# Patient Record
Sex: FEMALE | Race: WHITE | NOT HISPANIC OR LATINO | Employment: FULL TIME | URBAN - METROPOLITAN AREA
[De-identification: names, ages, dates, MRNs, and addresses within clinical notes are randomized per-mention and may not be internally consistent; named-entity substitution may affect disease eponyms.]

---

## 2017-02-17 ENCOUNTER — OFFICE VISIT (OUTPATIENT)
Dept: URGENT CARE | Facility: URGENT CARE | Age: 32
End: 2017-02-17
Payer: COMMERCIAL

## 2017-02-17 VITALS
HEART RATE: 110 BPM | OXYGEN SATURATION: 99 % | WEIGHT: 203 LBS | HEIGHT: 65 IN | TEMPERATURE: 101.3 F | RESPIRATION RATE: 18 BRPM | SYSTOLIC BLOOD PRESSURE: 124 MMHG | BODY MASS INDEX: 33.82 KG/M2 | DIASTOLIC BLOOD PRESSURE: 78 MMHG

## 2017-02-17 DIAGNOSIS — J20.9 ACUTE BRONCHITIS WITH COEXISTING CONDITION REQUIRING PROPHYLACTIC TREATMENT: ICD-10-CM

## 2017-02-17 DIAGNOSIS — R07.0 THROAT PAIN: Primary | ICD-10-CM

## 2017-02-17 LAB
DEPRECATED S PYO AG THROAT QL EIA: NORMAL
MICRO REPORT STATUS: NORMAL
SPECIMEN SOURCE: NORMAL

## 2017-02-17 PROCEDURE — 87880 STREP A ASSAY W/OPTIC: CPT | Performed by: FAMILY MEDICINE

## 2017-02-17 PROCEDURE — 99213 OFFICE O/P EST LOW 20 MIN: CPT | Performed by: FAMILY MEDICINE

## 2017-02-17 PROCEDURE — 87081 CULTURE SCREEN ONLY: CPT | Performed by: FAMILY MEDICINE

## 2017-02-17 RX ORDER — DOXYCYCLINE 100 MG/1
100 CAPSULE ORAL 2 TIMES DAILY
Qty: 20 CAPSULE | Refills: 0 | Status: SHIPPED | OUTPATIENT
Start: 2017-02-17 | End: 2017-02-27

## 2017-02-17 RX ORDER — ALBUTEROL SULFATE 90 UG/1
1-2 AEROSOL, METERED RESPIRATORY (INHALATION) EVERY 4 HOURS PRN
Qty: 1 INHALER | Refills: 0 | Status: SHIPPED | OUTPATIENT
Start: 2017-02-17 | End: 2018-03-07

## 2017-02-17 NOTE — MR AVS SNAPSHOT
"              After Visit Summary   2/17/2017    Sayda Valles    MRN: [id number]           Patient Information     Date Of Birth          1985        Visit Information        Provider Department      2/17/2017 8:15 PM Marielle Steiner MD [facility]        Today's Diagnoses     Throat pain    -  1    Acute bronchitis with coexisting condition requiring prophylactic treatment           Follow-ups after your visit        Who to contact     If you have questions or need follow up information about today's clinic visit or your schedule please contact [facility] directly at [phone].  Normal or non-critical lab and imaging results will be communicated to you by ConnectionPlushart, letter or phone within 4 business days after the clinic has received the results. If you do not hear from us within 7 days, please contact the clinic through ConnectionPlushart or phone. If you have a critical or abnormal lab result, we will notify you by phone as soon as possible.  Submit refill requests through Qbix or call your pharmacy and they will forward the refill request to us. Please allow 3 business days for your refill to be completed.          Additional Information About Your Visit        MyChart Information     Qbix gives you secure access to your electronic health record. If you see a primary care provider, you can also send messages to your care team and make appointments. If you have questions, please call your primary care clinic.  If you do not have a primary care provider, please call [phone] and they will assist you.        Care EveryWhere ID     This is your Care EveryWhere ID. This could be used by other organizations to access your [facility] medical records  [id number]        Your Vitals Were     Pulse Temperature Respirations Height Pulse Oximetry BMI (Body Mass Index)    110 101.3  F (38.5  C) (Oral) 18 5' 5\" (1.651 m) 99% 33.78 kg/m2       Blood Pressure from Last 3 " Encounters:   02/17/17 124/78   11/17/16 120/79   09/28/16 134/80    Weight from Last 3 Encounters:   02/17/17 203 lb (92.1 kg)   11/17/16 203 lb (92.1 kg)   09/28/16 199 lb (90.3 kg)              We Performed the Following     Beta strep group A culture     Strep, Rapid Screen          Today's Medication Changes          These changes are accurate as of: 2/17/17  9:21 PM.  If you have any questions, ask your nurse or doctor.               Start taking these medicines.        Dose/Directions    doxycycline 100 MG capsule   Commonly known as:  VIBRAMYCIN   Used for:  Acute bronchitis with coexisting condition requiring prophylactic treatment   Started by:  Marielle Steiner MD        Dose:  100 mg   Take 1 capsule (100 mg) by mouth 2 times daily for 10 days   Quantity:  20 capsule   Refills:  0         These medicines have changed or have updated prescriptions.        Dose/Directions    * albuterol 108 (90 BASE) MCG/ACT Inhaler   Commonly known as:  PROAIR HFA/PROVENTIL HFA/VENTOLIN HFA   This may have changed:  Another medication with the same name was added. Make sure you understand how and when to take each.   Used for:  Mild intermittent asthma   Changed by:  Kristyn Cross MD        Dose:  2 puff   Inhale 2 puffs into the lungs 4 times daily as needed   Quantity:  3 Inhaler   Refills:  12       * albuterol 108 (90 BASE) MCG/ACT Inhaler   Commonly known as:  PROAIR HFA/PROVENTIL HFA/VENTOLIN HFA   This may have changed:  You were already taking a medication with the same name, and this prescription was added. Make sure you understand how and when to take each.   Used for:  Acute bronchitis with coexisting condition requiring prophylactic treatment   Changed by:  Marielle Steiner MD        Dose:  1-2 puff   Inhale 1-2 puffs into the lungs every 4 hours as needed for shortness of breath / dyspnea or wheezing   Quantity:  1 Inhaler   Refills:  0       * Notice:  This list has 2 medication(s) that are  the same as other medications prescribed for you. Read the directions carefully, and ask your doctor or other care provider to review them with you.         Where to get your medicines      These medications were sent to [id number]  [place], [place] - [address] [place] [address] AT [facility] OF [facility] [address], [place] [address]    Hours:  24-hours Phone:  [phone]     albuterol 108 (90 BASE) MCG/ACT Inhaler    doxycycline 100 MG capsule                Primary Care Provider Office Phone # Fax #    Karen Weiler, [phone]       [facility] [place] [address] [facility] [address]        Thank you!     Thank you for choosing [facility]  for your care. Our goal is always to provide you with excellent care. Hearing back from our patients is one way we can continue to improve our services. Please take a few minutes to complete the written survey that you may receive in the mail after your visit with us. Thank you!             Your Updated Medication List - Protect others around you: Learn how to safely use, store and throw away your medicines at www.disposemymeds.org.          This list is accurate as of: 2/17/17  9:21 PM.  Always use your most recent med list.                   Brand Name Dispense Instructions for use    * albuterol 108 (90 BASE) MCG/ACT Inhaler    PROAIR HFA/PROVENTIL HFA/VENTOLIN HFA    3 Inhaler    Inhale 2 puffs into the lungs 4 times daily as needed       * albuterol 108 (90 BASE) MCG/ACT Inhaler    PROAIR HFA/PROVENTIL HFA/VENTOLIN HFA    1 Inhaler    Inhale 1-2 puffs into the lungs every 4 hours as needed for shortness of breath / dyspnea or wheezing       doxycycline 100 MG capsule    VIBRAMYCIN    20 capsule    Take 1 capsule (100 mg) by mouth 2 times daily for 10 days       fluticasone 110 MCG/ACT Inhaler    FLOVENT HFA    3 Inhaler    Inhale 2 puffs into the lungs 2 times daily 2 puffs       norethindrone-ethinyl  estradiol 1-20 MG-MCG per tablet    JUNEL FE 1/20    84 tablet    Take 1 tablet by mouth daily       * Notice:  This list has 2 medication(s) that are the same as other medications prescribed for you. Read the directions carefully, and ask your doctor or other care provider to review them with you.

## 2017-02-18 ENCOUNTER — OFFICE VISIT (OUTPATIENT)
Dept: FAMILY MEDICINE | Facility: CLINIC | Age: 32
End: 2017-02-18
Payer: COMMERCIAL

## 2017-02-18 VITALS
SYSTOLIC BLOOD PRESSURE: 122 MMHG | HEIGHT: 65 IN | WEIGHT: 210 LBS | TEMPERATURE: 98.3 F | DIASTOLIC BLOOD PRESSURE: 66 MMHG | RESPIRATION RATE: 12 BRPM | HEART RATE: 88 BPM | BODY MASS INDEX: 34.99 KG/M2

## 2017-02-18 DIAGNOSIS — J20.9 ACUTE BRONCHITIS, UNSPECIFIED ORGANISM: Primary | ICD-10-CM

## 2017-02-18 PROCEDURE — 99213 OFFICE O/P EST LOW 20 MIN: CPT | Performed by: FAMILY MEDICINE

## 2017-02-18 RX ORDER — AMOXICILLIN 500 MG/1
500 CAPSULE ORAL 3 TIMES DAILY
Qty: 30 CAPSULE | Refills: 0 | Status: SHIPPED | OUTPATIENT
Start: 2017-02-18 | End: 2017-05-15

## 2017-02-18 NOTE — PROGRESS NOTES
"  SUBJECTIVE:                                                    Sayda Valles is a 31 year old female who presents to clinic today for the following health issues:      Acute Illness   Acute illness concerns: cough  Onset: x 1 week    Fever: no    Chills/Sweats: no    Headache (location?): no    Sinus Pressure:no    Conjunctivitis:  no    Ear Pain: no    Rhinorrhea: no    Congestion: no    Sore Throat: no     Cough: YES-productive of clear sputum    Wheeze: no    Decreased Appetite: no    Nausea: no    Vomiting: no    Diarrhea:  no    Dysuria/Freq.: no    Fatigue/Achiness: no    Sick/Strep Exposure: no     Therapies Tried and outcome: tylenol  Patient was seen in Urgent care. Did not have a good experience.  Was given doxycycline but not sure if she should be taking it.      OBJECTIVE:                       /66  Pulse 88  Temp 98.3  F (36.8  C) (Tympanic)  Resp 12  Ht 5' 5\" (1.651 m)  Wt 210 lb (95.3 kg)  LMP 02/04/2017  PF 97 L/min  Breastfeeding? No  BMI 34.95 kg/m2  GENERAL: no apparent distress  EYES: Conjunctiva are not injected, no discharge.  EARS: Left TM -no erythema, no effusion,  not bulged.               Right TM -no erythema, no effusion,  not bulged.  NOSE: no discharge, no sinus tenderness  THROAT: no erythema, no exudate, no lesions  NECK: supple, no adenopathy.  CARDIAC: regular rate and rhythm, no murmur  RESP: clear, no wheezing, no rales, no rhonchi  ABD: soft, no distension, no tenderness  SKIN: No rashes      Diagnostic testing:(labs, x-rays, EKG) - None    ASSESSMENT/PLAN:                         ICD-10-CM    1. Acute bronchitis, unspecified organism J20.9 amoxicillin (AMOXIL) 500 MG capsule     Patient wants to switch to a different antibiotic.  Will switch her to Amoxicillin. If not improving, may consider CXR.    Symptomatic cares and fever control(if indicated) discussed.  Risks and benefits of meds discussed.      Karen Weiler, MD  [facility]        "

## 2017-02-18 NOTE — MR AVS SNAPSHOT
"              After Visit Summary   2/18/2017    Sayda Valles    MRN: [id number]           Patient Information     Date Of Birth          1985        Visit Information        Provider Department      2/18/2017 11:20 AM Weiler, Karen, MD [facility]        Today's Diagnoses     Acute bronchitis, unspecified organism    -  1       Follow-ups after your visit        Who to contact     If you have questions or need follow up information about today's clinic visit or your schedule please contact [facility] directly at [phone].  Normal or non-critical lab and imaging results will be communicated to you by Everesthart, letter or phone within 4 business days after the clinic has received the results. If you do not hear from us within 7 days, please contact the clinic through Wireless Safetyt or phone. If you have a critical or abnormal lab result, we will notify you by phone as soon as possible.  Submit refill requests through OGPlanet or call your pharmacy and they will forward the refill request to us. Please allow 3 business days for your refill to be completed.          Additional Information About Your Visit        MyChart Information     OGPlanet gives you secure access to your electronic health record. If you see a primary care provider, you can also send messages to your care team and make appointments. If you have questions, please call your primary care clinic.  If you do not have a primary care provider, please call [phone] and they will assist you.        Care EveryWhere ID     This is your Care EveryWhere ID. This could be used by other organizations to access your [facility] medical records  [id number]        Your Vitals Were     Pulse Temperature Respirations Height Last Period Peak Flow    88 98.3  F (36.8  C) (Tympanic) 12 5' 5\" (1.651 m) 02/04/2017 97 L/min    Breastfeeding? BMI (Body Mass Index)                No 34.95 kg/m2           Blood Pressure from Last 3 " Encounters:   02/18/17 122/66   02/17/17 124/78   11/17/16 120/79    Weight from Last 3 Encounters:   02/18/17 210 lb (95.3 kg)   02/17/17 203 lb (92.1 kg)   11/17/16 203 lb (92.1 kg)              Today, you had the following     No orders found for display         Today's Medication Changes          These changes are accurate as of: 2/18/17 11:59 PM.  If you have any questions, ask your nurse or doctor.               Start taking these medicines.        Dose/Directions    amoxicillin 500 MG capsule   Commonly known as:  AMOXIL   Used for:  Acute bronchitis, unspecified organism   Started by:  Weiler, Karen, MD        Dose:  500 mg   Take 1 capsule (500 mg) by mouth 3 times daily   Quantity:  30 capsule   Refills:  0            Where to get your medicines      These medications were sent to [facility] [address] [facility]  [address] [facility] [address]     Phone:  [phone]     amoxicillin 500 MG capsule                Primary Care Provider Office Phone # Fax #    Karen Weiler, [phone]       [facility] [address] [facility] [address]        Thank you!     Thank you for choosing [facility]  for your care. Our goal is always to provide you with excellent care. Hearing back from our patients is one way we can continue to improve our services. Please take a few minutes to complete the written survey that you may receive in the mail after your visit with us. Thank you!             Your Updated Medication List - Protect others around you: Learn how to safely use, store and throw away your medicines at www.disposemymeds.org.          This list is accurate as of: 2/18/17 11:59 PM.  Always use your most recent med list.                   Brand Name Dispense Instructions for use    * albuterol 108 (90 BASE) MCG/ACT Inhaler    PROAIR HFA/PROVENTIL HFA/VENTOLIN HFA    3 Inhaler    Inhale 2 puffs into the lungs 4  times daily as needed       * albuterol 108 (90 BASE) MCG/ACT Inhaler    PROAIR HFA/PROVENTIL HFA/VENTOLIN HFA    1 Inhaler    Inhale 1-2 puffs into the lungs every 4 hours as needed for shortness of breath / dyspnea or wheezing       amoxicillin 500 MG capsule    AMOXIL    30 capsule    Take 1 capsule (500 mg) by mouth 3 times daily       doxycycline 100 MG capsule    VIBRAMYCIN    20 capsule    Take 1 capsule (100 mg) by mouth 2 times daily for 10 days       fluticasone 110 MCG/ACT Inhaler    FLOVENT HFA    3 Inhaler    Inhale 2 puffs into the lungs 2 times daily 2 puffs       norethindrone-ethinyl estradiol 1-20 MG-MCG per tablet    JUNEL FE 1/20    84 tablet    Take 1 tablet by mouth daily       * Notice:  This list has 2 medication(s) that are the same as other medications prescribed for you. Read the directions carefully, and ask your doctor or other care provider to review them with you.

## 2017-02-18 NOTE — NURSING NOTE
"Chief Complaint   Patient presents with     Urgent Care     URI     1 week, cough sore throat, started feeling better yesterday deep cough started, headache        Initial /78 (BP Location: Right arm, Cuff Size: Adult Large)  Pulse 110  Temp 101.3  F (38.5  C) (Oral)  Resp 18  Ht 5' 5\" (1.651 m)  Wt 203 lb (92.1 kg)  SpO2 99%  BMI 33.78 kg/m2 Estimated body mass index is 33.78 kg/(m^2) as calculated from the following:    Height as of this encounter: 5' 5\" (1.651 m).    Weight as of this encounter: 203 lb (92.1 kg).  Medication Reconciliation: complete     Anastacia Perez CMA      "

## 2017-02-18 NOTE — NURSING NOTE
"Chief Complaint   Patient presents with     Cough       Initial /66  Pulse 88  Temp 98.3  F (36.8  C) (Tympanic)  Resp 12  Ht 5' 5\" (1.651 m)  Wt 210 lb (95.3 kg)  LMP 02/04/2017  PF 97 L/min  Breastfeeding? No  BMI 34.95 kg/m2 Estimated body mass index is 34.95 kg/(m^2) as calculated from the following:    Height as of this encounter: 5' 5\" (1.651 m).    Weight as of this encounter: 210 lb (95.3 kg).  Medication Reconciliation: complete   Laury Bloedow LPN    "

## 2017-02-18 NOTE — PROGRESS NOTES
SUBJECTIVE:  Chief Complaint   Patient presents with     Urgent Care     URI     1 week, cough sore throat, started feeling better yesterday deep cough started, headache      Sayda Valles is a 31 year old female who presents to the clinic today with a chief complaint of cough , shortness of breath., wheezing. and headache, fever for 8 day(s).  Patient denies pleuritic chest pain  Her cough is described as persistent, daytime, nightime and wheezing.    The patient's symptoms are moderate and worsening.  Associated symptoms include malaise, wheezing and headache. The patient's symptoms are exacerbated by no particular triggers  Patient has been using nothing  to improve symptoms.  Her daughter has strep throat    Past Medical History   Diagnosis Date     Allergic rhinitis, cause unspecified      Allergic rhinitis     Family history of malignant neoplasm of breast      4 generations on her father's side     FAMILY HX BREAST MALIG      Heart palpitations 2/10     PVC's - dr sandhu     Migraine without aura, with intractable migraine, so stated, without mention of status migrainosus      sees neurologist     Mild persistent asthma      Simple goiter 11/25/2008       ALLERGIES:  Zithromax [azithromycin dihydrate]      Current Outpatient Prescriptions on File Prior to Visit:  fluticasone (FLOVENT HFA) 110 MCG/ACT inhaler Inhale 2 puffs into the lungs 2 times daily 2 puffs   norethindrone-ethinyl estradiol (JUNEL FE 1/20) 1-20 MG-MCG per tablet Take 1 tablet by mouth daily   albuterol (PROAIR HFA, PROVENTIL HFA, VENTOLIN HFA) 108 (90 BASE) MCG/ACT inhaler Inhale 2 puffs into the lungs 4 times daily as needed     No current facility-administered medications on file prior to visit.     Social History   Substance Use Topics     Smoking status: Former Smoker     Quit date: 2/2/2001     Smokeless tobacco: Never Used     Alcohol use Yes      Comment: rarely       Family History   Problem Relation Age of Onset     Lipids Father       "Neurologic Disorder Father      epilepsy     HEART DISEASE Father 47     MI     Hypertension Father      Hyperlipidemia Father      Lipids Sister      Hyperlipidemia Sister      Breast Cancer Paternal Grandmother      3rd generation      Breast Cancer Paternal Aunt      DIABETES Mother      gestational      Asthma Mother      DIABETES Paternal Grandfather      Other Cancer Maternal Grandfather      lung         ROS  INTEGUMENTARY/SKIN: NEGATIVE for worrisome rashes, moles or lesions  EYES: NEGATIVE for vision changes or irritation  GI: NEGATIVE for nausea, abdominal pain, heartburn, or change in bowel habits    OBJECTIVE:  /78 (BP Location: Right arm, Cuff Size: Adult Large)  Pulse 110  Temp 101.3  F (38.5  C) (Oral)  Resp 18  Ht 5' 5\" (1.651 m)  Wt 203 lb (92.1 kg)  SpO2 99%  BMI 33.78 kg/m2  GENERAL APPEARANCE: alert, moderate distress and cooperative  EYES: EOMI,  PERRL, conjunctiva clear  HENT: ear canals and TM's normal.  Nose and mouth without ulcers, erythema or lesions  NECK: supple, nontender, no lymphadenopathy  RESP: lungs clear to auscultation - no rales, rhonchi or wheezes  CV: regular rates and rhythm, normal S1 S2, no murmur noted  NEURO: Normal strength and tone, sensory exam grossly normal,  normal speech and mentation  SKIN: no suspicious lesions or rashes     Results for orders placed or performed in visit on 02/17/17   Strep, Rapid Screen   Result Value Ref Range    Specimen Description Throat     Rapid Strep A Screen       NEGATIVE: No Group A streptococcal antigen detected by immunoassay, await   culture report.      Micro Report Status FINAL 02/17/2017          ASSESSMENT:    Throat pain     - Strep, Rapid Screen  - Beta strep group A culture    Acute bronchitis with coexisting condition requiring prophylactic treatment     - albuterol (PROAIR HFA/PROVENTIL HFA/VENTOLIN HFA) 108 (90 BASE) MCG/ACT Inhaler; Inhale 1-2 puffs into the lungs every 4 hours as needed for shortness of breath / " dyspnea or wheezing  - doxycycline (VIBRAMYCIN) 100 MG capsule; Take 1 capsule (100 mg) by mouth 2 times daily for 10 days       Symptomatic measures encouraged, humidified air, plenty of fluids.  Patient may consider OTC expectorant and/or cough suppressant to treat symptoms.  Return if worsening

## 2017-02-19 LAB
BACTERIA SPEC CULT: NORMAL
MICRO REPORT STATUS: NORMAL
SPECIMEN SOURCE: NORMAL

## 2017-02-20 ENCOUNTER — TELEPHONE (OUTPATIENT)
Dept: FAMILY MEDICINE | Facility: CLINIC | Age: 32
End: 2017-02-20

## 2017-02-23 NOTE — TELEPHONE ENCOUNTER
Spoke to patient. She states she is starting to feel better today. She denies any fevers. She still has a deep cough. She occasionally has some right-sided chest wall pain with cough.Discussed option of having her come in for a chest x-ray versus just waiting until she finishes up the antibiotics. If she is still having symptoms next week we can get her in for a chest x-ray. Recommended that she try some over-the-counter cough syrup in the meantime.She will call if worsening symptoms.      Karen Weiler, MD

## 2017-05-15 ENCOUNTER — OFFICE VISIT (OUTPATIENT)
Dept: FAMILY MEDICINE | Facility: CLINIC | Age: 32
End: 2017-05-15
Payer: COMMERCIAL

## 2017-05-15 VITALS
SYSTOLIC BLOOD PRESSURE: 112 MMHG | DIASTOLIC BLOOD PRESSURE: 78 MMHG | WEIGHT: 209 LBS | OXYGEN SATURATION: 99 % | TEMPERATURE: 97.9 F | HEIGHT: 65 IN | BODY MASS INDEX: 34.82 KG/M2 | HEART RATE: 98 BPM

## 2017-05-15 DIAGNOSIS — R00.2 PALPITATIONS: Primary | ICD-10-CM

## 2017-05-15 DIAGNOSIS — R53.83 FATIGUE, UNSPECIFIED TYPE: ICD-10-CM

## 2017-05-15 DIAGNOSIS — D22.9 ATYPICAL MOLE: ICD-10-CM

## 2017-05-15 DIAGNOSIS — J20.9 ACUTE BRONCHITIS, UNSPECIFIED ORGANISM: ICD-10-CM

## 2017-05-15 LAB
ERYTHROCYTE [DISTWIDTH] IN BLOOD BY AUTOMATED COUNT: 12.9 % (ref 10–15)
HCT VFR BLD AUTO: 39.4 % (ref 35–47)
HGB BLD-MCNC: 13.4 G/DL (ref 11.7–15.7)
MCH RBC QN AUTO: 27.6 PG (ref 26.5–33)
MCHC RBC AUTO-ENTMCNC: 34 G/DL (ref 31.5–36.5)
MCV RBC AUTO: 81 FL (ref 78–100)
PLATELET # BLD AUTO: 327 10E9/L (ref 150–450)
RBC # BLD AUTO: 4.86 10E12/L (ref 3.8–5.2)
WBC # BLD AUTO: 8.7 10E9/L (ref 4–11)

## 2017-05-15 PROCEDURE — 99214 OFFICE O/P EST MOD 30 MIN: CPT | Performed by: FAMILY MEDICINE

## 2017-05-15 PROCEDURE — 80048 BASIC METABOLIC PNL TOTAL CA: CPT | Performed by: FAMILY MEDICINE

## 2017-05-15 PROCEDURE — 84443 ASSAY THYROID STIM HORMONE: CPT | Performed by: FAMILY MEDICINE

## 2017-05-15 PROCEDURE — 85027 COMPLETE CBC AUTOMATED: CPT | Performed by: FAMILY MEDICINE

## 2017-05-15 PROCEDURE — 36415 COLL VENOUS BLD VENIPUNCTURE: CPT | Performed by: FAMILY MEDICINE

## 2017-05-15 PROCEDURE — 82306 VITAMIN D 25 HYDROXY: CPT | Performed by: FAMILY MEDICINE

## 2017-05-15 RX ORDER — AMOXICILLIN 500 MG/1
500 CAPSULE ORAL 3 TIMES DAILY
Qty: 30 CAPSULE | Refills: 0 | Status: SHIPPED | OUTPATIENT
Start: 2017-05-15 | End: 2017-06-08

## 2017-05-15 NOTE — PROGRESS NOTES
"  SUBJECTIVE:                                                    Sayda Valles is a 31 year old female who presents to clinic today for the following health issues:      Acute Illness   Acute illness concerns: cold symptoms  Onset: 2 weeks    Fever: no    Chills/Sweats: no    Headache (location?): YES    Sinus Pressure:no    Conjunctivitis:  no    Ear Pain: no - but ears feels muffled    Rhinorrhea: YES    Congestion: YES    Sore Throat: YES- in beginning but has resolved     Cough: YES-productive of yellow sputum,  productive of green sputum, worse at night     Wheeze: no    Decreased Appetite: no    Nausea: no    Vomiting: no    Diarrhea:  no    Dysuria/Freq.: no    Fatigue/Achiness: YES    Sick/Strep Exposure: YES- daughter sick with cold symptoms as well      Therapies Tried and outcome: ibuprofen     Episode of palpitations one day that lasted 2 minutes.  Was very scary.  Have been having them more frequently.  She did use albuterol inhaler just prior to the episode.  Felt very ill that day. [place] like her heart was racing-felt like it was out of rhythm.   Takes her Albuterol frequently and does not have the symptoms. Has done 2 previous monitors in the past.      OBJECTIVE:                       /78 (BP Location: Right arm, Patient Position: Chair, Cuff Size: Adult Large)  Pulse 98  Temp 97.9  F (36.6  C) (Oral)  Ht 5' 5\" (1.651 m)  Wt 209 lb (94.8 kg)  SpO2 99%  BMI 34.78 kg/m2  GENERAL: no apparent distress  EYES: Conjunctiva are not injected, no discharge.  EARS: Left TM -no erythema, no effusion,  not bulged.               Right TM -no erythema, no effusion,  not bulged.  NOSE: no discharge, no sinus tenderness  THROAT: no erythema, no exudate, no lesions  NECK: supple, no adenopathy.  CARDIAC: regular rate and rhythm, no murmur  RESP: clear, no wheezing, no rales, no rhonchi  ABD: soft, no distension, no tenderness  SKIN: No rashes      Diagnostic testing:(labs, x-rays, EKG) - " None    ASSESSMENT/PLAN:                         ICD-10-CM    1. Palpitations R00.2 CARDIOLOGY EVAL ADULT REFERRAL   Will have patient see Cardiology  TSH with free T4 reflex   2. Acute bronchitis, unspecified organism J20.9 amoxicillin (AMOXIL) 500 MG capsule   3. Fatigue, unspecified type R53.83 CBC with platelets   ?etiology. Will check labs  Basic metabolic panel     TSH with free T4 reflex     Vitamin D Deficiency   4. Atypical mole D22.9 DERMATOLOGY REFERRAL         Symptomatic cares and fever control(if indicated) discussed.  Risks and benefits of meds discussed.      Karen Weiler, MD  [facility]

## 2017-05-15 NOTE — MR AVS SNAPSHOT
After Visit Summary   5/15/2017    Sayda Valles    MRN: [id number]           Patient Information     Date Of Birth          1985        Visit Information        Provider Department      5/15/2017 3:00 PM Weiler, Karen, MD [facility] [place]        Today's Diagnoses     Palpitations    -  1    Acute bronchitis, unspecified organism        Fatigue, unspecified type        Atypical mole           Follow-ups after your visit        Additional Services     CARDIOLOGY EVAL ADULT REFERRAL       Your provider has referred you to:  [facility] [phone]   [email or web address]    Please be aware that coverage of these services is subject to the terms and limitations of your health insurance plan.  Call member services at your health plan with any benefit or coverage questions.      Type of Referral:  Cardiology Follow Up    Timeframe requested:  Within 1 month    Please bring the following to your appointment:  >>   Any x-rays, CTs or MRIs which have been performed.  Contact the facility where they were done to arrange for  prior to your scheduled appointment.    >>   List of current medications  >>   This referral request   >>   Any documents/labs given to you for this referral            DERMATOLOGY REFERRAL       Your provider has referred you to: G: [facility] [place] [phone]   [email or web address]    Please be aware that coverage of these services is subject to the terms and limitations of your health insurance plan.  Call member services at your health plan with any benefit or coverage questions.      Please bring the following with you to your appointment:    (1) Any X-Rays, CTs or MRIs which have been performed.  Contact the facility where they were done to arrange for  prior to your scheduled appointment.  Any new CT, MRI or  "other procedures ordered by your specialist must be performed at a [facility] facility or coordinated by your clinic's referral office.  (2) List of current medications  (3) This referral request   (4) Any documents/labs given to you for this referral                  Who to contact     If you have questions or need follow up information about today's clinic visit or your schedule please contact [facility] [place] directly at [phone].  Normal or non-critical lab and imaging results will be communicated to you by [employer], letter or phone within 4 business days after the clinic has received the results. If you do not hear from us within 7 days, please contact the clinic through [employer] or phone. If you have a critical or abnormal lab result, we will notify you by phone as soon as possible.  Submit refill requests through [employer] or call your pharmacy and they will forward the refill request to us. Please allow 3 business days for your refill to be completed.          Additional Information About Your Visit        [employer] Information     [employer] gives you secure access to your electronic health record. If you see a primary care provider, you can also send messages to your care team and make appointments. If you have questions, please call your primary care clinic.  If you do not have a primary care provider, please call [phone] and they will assist you.        Care EveryWhere ID     This is your Care EveryWhere ID. This could be used by other organizations to access your [facility] medical records  [id number]        Your Vitals Were     Pulse Temperature Height Pulse Oximetry BMI (Body Mass Index)       98 97.9  F (36.6  C) (Oral) 5' 5\" (1.651 m) 99% 34.78 kg/m2        Blood Pressure from Last 3 Encounters:   05/15/17 112/78   02/18/17 122/66   02/17/17 124/78    Weight from Last 3 Encounters:   05/15/17 209 lb (94.8 kg)   02/18/17 210 lb (95.3 kg)   02/17/17 203 lb (92.1 kg)              We Performed the " Following     Asthma Action Plan (AAP)     Basic metabolic panel     CARDIOLOGY EVAL ADULT REFERRAL     CBC with platelets     DERMATOLOGY REFERRAL     TSH with free T4 reflex     Vitamin D Deficiency          Today's Medication Changes          These changes are accurate as of: 5/15/17  3:55 PM.  If you have any questions, ask your nurse or doctor.               Start taking these medicines.        Dose/Directions    amoxicillin 500 MG capsule   Commonly known as:  AMOXIL   Used for:  Acute bronchitis, unspecified organism   Started by:  Weiler, Karen, MD        Dose:  500 mg   Take 1 capsule (500 mg) by mouth 3 times daily   Quantity:  30 capsule   Refills:  0            Where to get your medicines      These medications were sent to [id number] - [place], [place] - [address] AT [place] [address]  [address], [place] [address]     Phone:  [phone]     amoxicillin 500 MG capsule                Primary Care Provider Office Phone # Fax #    Karen Weiler, [phone]       [facility] [address]  [place] [address]        Thank you!     Thank you for choosing [facility]  for your care. Our goal is always to provide you with excellent care. Hearing back from our patients is one way we can continue to improve our services. Please take a few minutes to complete the written survey that you may receive in the mail after your visit with us. Thank you!             Your Updated Medication List - Protect others around you: Learn how to safely use, store and throw away your medicines at www.disposemymeds.org.          This list is accurate as of: 5/15/17  3:55 PM.  Always use your most recent med list.                   Brand Name Dispense Instructions for use    albuterol 108 (90 BASE) MCG/ACT Inhaler    PROAIR HFA/PROVENTIL HFA/VENTOLIN HFA    1 Inhaler    Inhale 1-2 puffs into the lungs every 4 hours as needed for shortness of breath / dyspnea or  wheezing       amoxicillin 500 MG capsule    AMOXIL    30 capsule    Take 1 capsule (500 mg) by mouth 3 times daily       fluticasone 110 MCG/ACT Inhaler    FLOVENT HFA    3 Inhaler    Inhale 2 puffs into the lungs 2 times daily 2 puffs       norethindrone-ethinyl estradiol 1-20 MG-MCG per tablet    JUNEL FE 1/20    84 tablet    Take 1 tablet by mouth daily

## 2017-05-15 NOTE — LETTER
My Asthma Action Plan  Name: Sayda Valles   YOB: 1985  Date: 5/15/2017   My doctor: Karen Weiler, MD   My clinic: [facility]        My Control Medicine: none  My Rescue Medicine: Albuterol (Proair/Ventolin/Proventil) inhaler 2 puffs every 4 hours as needed    My Asthma Severity: mild intermittent  Avoid your asthma triggers: upper respiratory infections, exercise or sports and artificial sweeteners               GREEN ZONE     Good Control    I feel good    No cough or wheeze    Can work, sleep and play without asthma symptoms       Take your asthma control medicine every day.     1. If exercise triggers your asthma, take your rescue medication    15 minutes before exercise or sports, and    During exercise if you have asthma symptoms  2. Spacer to use with inhaler: If you have a spacer, make sure to use it with your inhaler             YELLOW ZONE     Getting Worse  I have ANY of these:    I do not feel good    Cough or wheeze    Chest feels tight    Wake up at night   1. Keep taking your Green Zone medications  2. Start taking your rescue medicine:    every 20 minutes for up to 1 hour. Then every 4 hours for 24-48 hours.  3. If you stay in the Yellow Zone for more than 12-24 hours, contact your doctor.  4. If you do not return to the Green Zone in 12-24 hours or you get worse, start taking your oral steroid medicine if prescribed by your provider.           RED ZONE     Medical Alert - Get Help  I have ANY of these:    I feel awful    Medicine is not helping    Breathing getting harder    Trouble walking or talking    Nose opens wide to breathe       1. Take your rescue medicine NOW  2. If your provider has prescribed an oral steroid medicine, start taking it NOW  3. Call your doctor NOW  4. If you are still in the Red Zone after 20 minutes and you have not reached your doctor:    Take your rescue medicine again and    Call 911 or go to the emergency room right away    See your  regular doctor within 2 weeks of an Emergency Room or Urgent Care visit for follow-up treatment.        Electronically signed by: Veronica Pastor, May 15, 2017    Annual Reminders:  Meet with Asthma Educator,  Flu Shot in the Fall, consider Pneumonia Vaccination for patients with asthma (aged 19 and older).    Pharmacy:    [place], [place]  [id number] - [place], [place] - [address] [facility] [address] [facility] [address] [facility] [address]  [facility] [address], [place] - [address] AT [facility] OF [facility] &  [address]                    Asthma Triggers  How To Control Things That Make Your Asthma Worse    Triggers are things that make your asthma worse.  Look at the list below to help you find your triggers and what you can do about them.  You can help prevent asthma flare-ups by staying away from your triggers.      Trigger                                                          What you can do   Cigarette Smoke  Tobacco smoke can make asthma worse. Do not allow smoking in your home, car or around you.  Be sure no one smokes at a child s day care or school.  If you smoke, ask your health care provider for ways to help you quit.  Ask family members to quit too.  Ask your health care provider for a referral to Quit Plan to help you quit smoking, or call 1-097-871-PLAN.     Colds, Flu, Bronchitis  These are common triggers of asthma. Wash your hands often.  Don t touch your eyes, nose or mouth.  Get a flu shot every year.     Dust Mites  These are tiny bugs that live in cloth or carpet. They are too small to see. Wash sheets and blankets in hot water every week.   Encase pillows and mattress in dust mite proof covers.  Avoid having carpet if you can. If you have carpet, vacuum weekly.   Use a dust mask and HEPA vacuum.   Pollen and Outdoor Mold  Some people are allergic to trees, grass, or weed pollen, or molds. Try to keep your  windows closed.  Limit time out doors when pollen count is high.   Ask you health care provider about taking medicine during allergy season.     Animal Dander  Some people are allergic to skin flakes, urine or saliva from pets with fur or feathers. Keep pets with fur or feathers out of your home.    If you can t keep the pet outdoors, then keep the pet out of your bedroom.  Keep the bedroom door closed.  Keep pets off cloth furniture and away from stuffed toys.     Mice, Rats, and Cockroaches  Some people are allergic to the waste from these pests.   Cover food and garbage.  Clean up spills and food crumbs.  Store grease in the refrigerator.   Keep food out of the bedroom.   Indoor Mold  This can be a trigger if your home has high moisture. Fix leaking faucets, pipes, or other sources of water.   Clean moldy surfaces.  Dehumidify basement if it is damp and smelly.   Smoke, Strong Odors, and Sprays  These can reduce air quality. Stay away from strong odors and sprays, such as perfume, powder, hair spray, paints, smoke incense, paint, cleaning products, candles and new carpet.   Exercise or Sports  Some people with asthma have this trigger. Be active!  Ask your doctor about taking medicine before sports or exercise to prevent symptoms.    Warm up for 5-10 minutes before and after sports or exercise.     Other Triggers of Asthma  Cold air:  Cover your nose and mouth with a scarf.  Sometimes laughing or crying can be a trigger.  Some medicines and food can trigger asthma.

## 2017-05-16 LAB
ANION GAP SERPL CALCULATED.3IONS-SCNC: 12 MMOL/L (ref 3–14)
BUN SERPL-MCNC: 9 MG/DL (ref 7–30)
CALCIUM SERPL-MCNC: 8.6 MG/DL (ref 8.5–10.1)
CHLORIDE SERPL-SCNC: 108 MMOL/L (ref 94–109)
CO2 SERPL-SCNC: 22 MMOL/L (ref 20–32)
CREAT SERPL-MCNC: 0.69 MG/DL (ref 0.52–1.04)
DEPRECATED CALCIDIOL+CALCIFEROL SERPL-MC: 26 UG/L (ref 20–75)
GFR SERPL CREATININE-BSD FRML MDRD: NORMAL ML/MIN/1.7M2
GLUCOSE SERPL-MCNC: 76 MG/DL (ref 70–99)
POTASSIUM SERPL-SCNC: 4.7 MMOL/L (ref 3.4–5.3)
SODIUM SERPL-SCNC: 142 MMOL/L (ref 133–144)
TSH SERPL DL<=0.005 MIU/L-ACNC: 0.72 MU/L (ref 0.4–4)

## 2017-05-16 ASSESSMENT — ASTHMA QUESTIONNAIRES: ACT_TOTALSCORE: 18

## 2017-06-07 ENCOUNTER — PRE VISIT (OUTPATIENT)
Dept: CARDIOLOGY | Facility: CLINIC | Age: 32
End: 2017-06-07

## 2017-06-08 ENCOUNTER — OFFICE VISIT (OUTPATIENT)
Dept: CARDIOLOGY | Facility: CLINIC | Age: 32
End: 2017-06-08
Payer: COMMERCIAL

## 2017-06-08 VITALS
HEART RATE: 84 BPM | DIASTOLIC BLOOD PRESSURE: 80 MMHG | HEIGHT: 65 IN | BODY MASS INDEX: 35.2 KG/M2 | WEIGHT: 211.3 LBS | SYSTOLIC BLOOD PRESSURE: 120 MMHG

## 2017-06-08 DIAGNOSIS — R00.2 HEART PALPITATIONS: Primary | ICD-10-CM

## 2017-06-08 PROCEDURE — 93000 ELECTROCARDIOGRAM COMPLETE: CPT | Performed by: INTERNAL MEDICINE

## 2017-06-08 PROCEDURE — 99203 OFFICE O/P NEW LOW 30 MIN: CPT | Performed by: INTERNAL MEDICINE

## 2017-06-08 RX ORDER — OMEGA-3 FATTY ACIDS/FISH OIL 300-1000MG
200 CAPSULE ORAL EVERY 4 HOURS PRN
COMMUNITY
End: 2017-12-01

## 2017-06-08 NOTE — PROGRESS NOTES
HPI and Plan:   See dictation:[id number]    Orders Placed This Encounter   Procedures     Follow-Up with Cardiologist     EKG 12-lead complete w/read - Clinics (performed today)       Orders Placed This Encounter   Medications     ibuprofen 200 MG capsule     Sig: Take 200 mg by mouth every 4 hours as needed for fever       Medications Discontinued During This Encounter   Medication Reason     amoxicillin (AMOXIL) 500 MG capsule Therapy completed     fluticasone (FLOVENT HFA) 110 MCG/ACT inhaler Stopped by Patient     norethindrone-ethinyl estradiol (JUNEL FE 1/20) 1-20 MG-MCG per tablet Stopped by Patient         Encounter Diagnosis   Name Primary?     Heart palpitations Yes       CURRENT MEDICATIONS:  Current Outpatient Prescriptions   Medication Sig Dispense Refill     ibuprofen 200 MG capsule Take 200 mg by mouth every 4 hours as needed for fever       albuterol (PROAIR HFA/PROVENTIL HFA/VENTOLIN HFA) 108 (90 BASE) MCG/ACT Inhaler Inhale 1-2 puffs into the lungs every 4 hours as needed for shortness of breath / dyspnea or wheezing 1 Inhaler 0       ALLERGIES     Allergies   Allergen Reactions     Zithromax [Azithromycin Dihydrate] GI Disturbance       PAST MEDICAL HISTORY:  Past Medical History:   Diagnosis Date     Allergic rhinitis, cause unspecified     Allergic rhinitis     Family history of malignant neoplasm of breast     4 generations on her father's side     FAMILY HX BREAST MALIG      Heart palpitations 2/10    PVC's - dr sandhu     Migraine without aura, with intractable migraine, so stated, without mention of status migrainosus     sees neurologist     Mild persistent asthma      Simple goiter 11/25/2008       PAST SURGICAL HISTORY:  Past Surgical History:   Procedure Laterality Date     NO HISTORY OF SURGERY         FAMILY HISTORY:  Family History   Problem Relation Age of Onset     Lipids Father      Neurologic Disorder Father      epilepsy     HEART DISEASE Father 47     MI     Hypertension Father       Hyperlipidemia Father      Lipids Sister      Hyperlipidemia Sister      Breast Cancer Paternal Grandmother      3rd generation      Breast Cancer Paternal Aunt      DIABETES Mother      gestational      Asthma Mother      DIABETES Paternal Grandfather      Other Cancer Maternal Grandfather      lung       SOCIAL HISTORY:  Social History     Social History     Marital status: Single     Spouse name: none     Number of children: N/A     Years of education: N/A     Occupational History      at [employer]  Other     Social History Main Topics     Smoking status: Former Smoker     Quit date: 2/2/2001     Smokeless tobacco: Never Used     Alcohol use Yes      Comment: rarely     Drug use: No     Sexual activity: Yes     Partners: Male     Birth control/ protection: Condom     Other Topics Concern      Service No     Blood Transfusions No     Caffeine Concern No     Occupational Exposure No     Hobby Hazards No     Sleep Concern No     Stress Concern No     Weight Concern Yes     Would like to lose more weight     Special Diet No     Back Care No     Exercise Yes     Moderate     Bike Helmet No     Seat Belt Yes     always      Self-Exams Yes     SBE encouraged monthly      Parent/Sibling W/ Cabg, Mi Or Angioplasty Before 65f 55m? Yes     Social History Narrative    Calcium - 2-3 dairy servings/ day     Menarche: at age 12    Menses: regular        Review of Systems:  Skin:  Negative       Eyes:  Positive for glasses for nighttime driving  ENT:  Negative      Respiratory:  Positive for shortness of breath;cough dyspnea and coughing with asthma   Cardiovascular:    Positive for;palpitations;chest pain;lightheadedness;edema;fatigue chest pain on right side; lightheadedness with palpitations; possble edema of the ankles at the end of the day  Gastroenterology: Negative      Genitourinary:  Negative      Musculoskeletal:  Positive for joint pain;back pain of the ankles  Neurologic:  Positive for  "headaches 1-2 headaches per week  Psychiatric:  Positive for anxiety with palpitations  Heme/Lymph/Imm:  Negative      Endocrine:  Negative        Physical Exam:  Vitals: /80 (BP Location: Right arm, Cuff Size: Adult Large)  Ht 1.651 m (5' 5\")  Wt 95.8 kg (211 lb 4.8 oz)  BMI 35.16 kg/m2    Constitutional:  cooperative, alert and oriented, well developed, well nourished, in no acute distress        Skin:  warm and dry to the touch, no apparent skin lesions or masses noted        Head:  normocephalic, no masses or lesions        Eyes:  pupils equal and round, conjunctivae and lids unremarkable, sclera white, no xanthalasma, EOMS intact, no nystagmus        ENT:  no pallor or cyanosis, dentition good        Neck:  carotid pulses are full and equal bilaterally, JVP normal, no carotid bruit, no thyromegaly        Chest:  normal breath sounds, clear to auscultation, normal A-P diameter, normal symmetry, normal respiratory excursion, no use of accessory muscles          Cardiac: regular rhythm, normal S1/S2, no S3 or S4, apical impulse not displaced, no murmurs, gallops or rubs                  Abdomen:  abdomen soft, non-tender, BS normoactive, no mass, no HSM, no bruits        Vascular: pulses full and equal, no bruits auscultated                                        Extremities and Back:  no deformities, clubbing, cyanosis, erythema observed;no edema              Neurological:  affect appropriate, oriented to time, person and place;no gross motor deficits              CC  Karen Weiler, MD  [facility]  [address] [place] [address]              "

## 2017-06-08 NOTE — LETTER
6/8/2017    Karen Weiler, MD  [facility]  [address] [place] [address]    RE: Sayda Valles       Dear Colleague,    I had the pleasure of seeing your patient, Sayda Valles, at [facility] for evaluation of palpitations.      Sayda Valles is a delightful 31-year-old female with a multiyear history of palpitations.  She was first seen by my associate, Dr. Evan Page, in 02/2010 for intermittent palpitations.  She would feel like her heart was racing and would last anywhere from up to 30 seconds and then resolve.  A Holter monitor in the past showed no significant arrhythmias and a rare PVC.  Longer event monitors also demonstrated no significant arrhythmias.  An echocardiogram performed on 08/16/2014 was normal.  Additionally, the patient had a previous stress test that was also normal.  She has had normal TSH, including 0.72 on 05/15/2017.  Renal function was normal on that same date.  Lipids are also normal.  She has no history of hypertension.      In 07/2016, the patient was going to play softball and used her inhaler for exertional asthma.  Within an hour, she had the feeling as though her heart was in a different rhythm and racing.  This lasted 2 minutes.  It did not cause any syncope or presyncope.    Evaluation was negative at that time.  On 05/11 while at work, she again used her inhaler as she was fighting a chest cold.  Within the next 1-1/2 hours, she had a similar episode lasting 2 minutes with resolution of her tachycardia.  The patient has not had any further episodes but is afraid to use her inhaler.  She generally uses her inhaler twice a week when she plays softball.  She notes that the inhaler causes her to be a little jittery.  She works as a  without difficulty.  She is a nonsmoker. She generally has 2 caffeinated drinks per day.   She has no history of diabetes mellitus or hypertension and there is no family history of heart disease.  She  believes she sleeps well and uninterrupted.      ALLERGIES:  Zithromax with GI upset.      MEDICATIONS:  As listed below.      PAST MEDICAL HISTORY:  Previous mole removal on her scalp, otherwise negative.  No history of peptic ulcer disease, cancer, tuberculosis, kidney stones or disease, hypo or hyperthyroidism.      SOCIAL HISTORY:  The patient is not , has 1 child, 11 years old.  She does not exercise routinely.      REVIEW OF SYSTEMS:  As listed below.      PHYSICAL EXAMINATION:   VITAL SIGNS:  Current blood pressure is 120/80, pulse 84 and regular, weight is 211 pounds, BMI is 35.   GENERAL:  The patient is an alert white female in no acute distress.   HEENT:  Benign without xanthelasma.   CHEST:  Clear to auscultation without wheezes, rales or rhonchi.   CARDIAC:  Regular rate and rhythm with some respiratory variation.  Normal S1 and S2 without gallop or murmur.  No JVD or HJR.  Pulses were all intact without bruits.   ABDOMEN:  Benign without organomegaly.   EXTREMITIES:  Without cyanosis, clubbing or edema.      EKG personally reviewed today demonstrates sinus arrhythmia without abnormalities.  This is a normal EKG.     Outpatient Encounter Prescriptions as of 6/8/2017   Medication Sig Dispense Refill     ibuprofen 200 MG capsule Take 200 mg by mouth every 4 hours as needed for fever       albuterol (PROAIR HFA/PROVENTIL HFA/VENTOLIN HFA) 108 (90 BASE) MCG/ACT Inhaler Inhale 1-2 puffs into the lungs every 4 hours as needed for shortness of breath / dyspnea or wheezing 1 Inhaler 0     [DISCONTINUED] amoxicillin (AMOXIL) 500 MG capsule Take 1 capsule (500 mg) by mouth 3 times daily 30 capsule 0     [DISCONTINUED] fluticasone (FLOVENT HFA) 110 MCG/ACT inhaler Inhale 2 puffs into the lungs 2 times daily 2 puffs 3 Inhaler 3     [DISCONTINUED] norethindrone-ethinyl estradiol (JUNEL FE 1/20) 1-20 MG-MCG per tablet Take 1 tablet by mouth daily 84 tablet 1     No facility-administered encounter medications  on file as of 6/8/2017.       ASSESSMENT:   1.  Sayda Valles is a delightful 31-year-old female with palpitations.  She has 2 types of palpitations that are of interest.  One lasts 30 seconds and has been recurring over the last 7 years.  The other lasting 2 minutes where she feels possibly lightheaded and is distressed.  It is certainly possible her inhaler is causing this.  However, I discussed the use of beta blockers with this patient.  We both agreed with her asthma that this would not be prudent.  Instead, I have asked the patient to begin an aerobic exercise program, exercising to a heart rate of 150 3-4 times a week to try to improve her parasympathetic nervous system.  I have asked her to continue to use her inhaler as she would normally use it.  I have also taught her how to take her pulse during these episodes to give us some idea how long they are lasting and at what speed.  She will tell us whether they are also irregular or regular.  I will then follow up with this patient in 3 months and evaluate how she is doing.  Perhaps nothing else needs to be done.  I cannot rule out a reentrant atrial tachycardia.  This is probably more likely than a ventricular arrhythmia.      It is my pleasure to assist in the care of Sayda Valles.  All her questions were answered to her satisfaction.  She will call for any protracted arrhythmia.  Her resting heart rate should remain somewhere between 50 and 100 beats a minute.  If it is faster than that at rest, she will give us a call.      Thank you for the privilege of allowing me to help care for this patient.     Sincerely,    Thomas Henderson MD     [facility]

## 2017-06-09 NOTE — PROGRESS NOTES
HISTORY OF PRESENT ILLNESS:  I had the pleasure of seeing your patient, Sayda Valles, at [facility] for evaluation of palpitations.      Sayda Valles is a delightful 31-year-old female with a multiyear history of palpitations.  She was first seen by my associate, Dr. Evan Page, in 02/2010 for intermittent palpitations.  She would feel like her heart was racing and would last anywhere from up to 30 seconds and then resolve.  A Holter monitor in the past showed no significant arrhythmias and a rare PVC.  Longer event monitors also demonstrated no significant arrhythmias.  An echocardiogram performed on 08/16/2014 was normal.  Additionally, the patient had a previous stress test that was also normal.  She has had normal TSH, including 0.72 on 05/15/2017.  Renal function was normal on that same date.  Lipids are also normal.  She has no history of hypertension.      In 07/2016, the patient was going to play softball and used her inhaler for exertional asthma.  Within an hour, she had the feeling as though her heart was in a different rhythm and racing.  This lasted 2 minutes.  It did not cause any syncope or presyncope.    Evaluation was negative at that time.  On 05/11 while at work, she again used her inhaler as she was fighting a chest cold.  Within the next 1-1/2 hours, she had a similar episode lasting 2 minutes with resolution of her tachycardia.  The patient has not had any further episodes but is afraid to use her inhaler.  She generally uses her inhaler twice a week when she plays softball.  She notes that the inhaler causes her to be a little jittery.  She works as a  without difficulty.  She is a nonsmoker. She generally has 2 caffeinated drinks per day.   She has no history of diabetes mellitus or hypertension and there is no family history of heart disease.  She believes she sleeps well and uninterrupted.      ALLERGIES:  Zithromax with GI upset.      MEDICATIONS:  As  listed below.      PAST MEDICAL HISTORY:  Previous mole removal on her scalp, otherwise negative.  No history of peptic ulcer disease, cancer, tuberculosis, kidney stones or disease, hypo or hyperthyroidism.      SOCIAL HISTORY:  The patient is not , has 1 child, 11 years old.  She does not exercise routinely.        REVIEW OF SYSTEMS:  As listed below.      PHYSICAL EXAMINATION:   VITAL SIGNS:  Current blood pressure is 120/80, pulse 84 and regular, weight is 211 pounds, BMI is 35.   GENERAL:  The patient is an alert white female in no acute distress.   HEENT:  Benign without xanthelasma.   CHEST:  Clear to auscultation without wheezes, rales or rhonchi.   CARDIAC:  Regular rate and rhythm with some respiratory variation.  Normal S1 and S2 without gallop or murmur.  No JVD or HJR.  Pulses were all intact without bruits.   ABDOMEN:  Benign without organomegaly.   EXTREMITIES:  Without cyanosis, clubbing or edema.      EKG personally reviewed today demonstrates sinus arrhythmia without abnormalities.  This is a normal EKG.      ASSESSMENT:   1.  Sayda Valles is a delightful 31-year-old female with palpitations.  She has 2 types of palpitations that are of interest.  One lasts 30 seconds and has been recurring over the last 7 years.  The other lasting 2 minutes where she feels possibly lightheaded and is distressed.  It is certainly possible her inhaler is causing this.  However, I discussed the use of beta blockers with this patient.  We both agreed with her asthma that this would not be prudent.  Instead, I have asked the patient to begin an aerobic exercise program, exercising to a heart rate of 150 3-4 times a week to try to improve her parasympathetic nervous system.  I have asked her to continue to use her inhaler as she would normally use it.  I have also taught her how to take her pulse during these episodes to give us some idea how long they are lasting and at what speed.  She will tell us whether they  are also irregular or regular.  I will then follow up with this patient in 3 months and evaluate how she is doing.  Perhaps nothing else needs to be done.  I cannot rule out a reentrant atrial tachycardia.  This is probably more likely than a ventricular arrhythmia.      It is my pleasure to assist in the care of Rohini Valles.  All her questions were answered to her satisfaction.  She will call for any protracted arrhythmia.  Her resting heart rate should remain somewhere between 50 and 100 beats a minute.  If it is faster than that at rest, she will give us a call.      Thank you for the privilege of allowing me to help care for this patient.      cc:   Karen Weiler, MD   [facility]   [address] [place]  [address]         ELLY HE MD, [facility]             D: 2017 13:41   T: 2017 09:03   MT: [email or web address]      Name:     ROHINI VALLES   MRN:      [id number]        Account:      [id number]   :      1985           Service Date: 2017      Document: [id number]

## 2017-08-08 ENCOUNTER — NURSE TRIAGE (OUTPATIENT)
Dept: NURSING | Facility: CLINIC | Age: 32
End: 2017-08-08

## 2017-08-09 NOTE — TELEPHONE ENCOUNTER
"Tooth extracted today. Taking Tylenol alternating with ibuprofen. Wants to switch from ibuprofen to naproxen OTC strength. Took ibuprofen 800mg at 3:45pm (6.5 hr ago). Took Tylenol 2 hrs ago and \"it's doing nothing for my pain\".  Asks if ok to take naproxen now. No hx of GI bleeding or other GI or renal problems. Advised okay to use naproxen instead of ibuprofen (not both) per package instructions. Take w/ food to prevent stomach upset.Reports she declined narcotics offered by dentist and she remains uninterested in taking narcotics. Valentine Johnson RN/FNA    Additional Information    Negative: Drug overdose and nurse unable to answer question    Negative: Caller requesting information not related to medicine    Negative: Caller requesting a prescription for Strep throat and has a positive culture result    Negative: Rash while taking a medication or within 3 days of stopping it    Negative: Immunization reaction suspected    Negative: [1] Asthma and [2] having symptoms of asthma (cough, wheezing, etc)    Negative: MORE THAN A DOUBLE DOSE of a prescription or over-the-counter (OTC) drug    Negative: [1] DOUBLE DOSE (an extra dose or lesser amount) of over-the-counter (OTC) drug AND [2] any symptoms (e.g., dizziness, nausea, pain, sleepiness)    Negative: [1] DOUBLE DOSE (an extra dose or lesser amount) of prescription drug AND [2] any symptoms (e.g., dizziness, nausea, pain, sleepiness)    Negative: Took another person's prescription drug    Negative: [1] DOUBLE DOSE (an extra dose or lesser amount) of prescription drug AND [2] NO symptoms (Exception: a double dose of antibiotics)    Negative: Diabetes drug error or overdose (e.g., insulin or extra dose)    Negative: [1] Request for URGENT new prescription or refill of \"essential\" medication (i.e., likelihood of harm to patient if not taken) AND [2] triager unable to fill per unit policy    Negative: [1] Prescription not at pharmacy AND [2] was prescribed today by " PCP    Negative: Pharmacy calling with prescription questions and triager unable to answer question    Negative: Caller has URGENT medication question about med that PCP prescribed and triager unable to answer question    Negative: Caller has NON-URGENT medication question about med that PCP prescribed and triager unable to answer question    Negative: Caller requesting a NON-URGENT new prescription or refill and triager unable to refill per unit policy    Negative: Caller has medication question about med not prescribed by PCP and triager unable to answer question (e.g., compatibility with other med, storage)    Negative: [1] DOUBLE DOSE (an extra dose or lesser amount) of over-the-counter (OTC) drug AND [2] NO symptoms (all triage questions negative)    Negative: [1] DOUBLE DOSE (an extra dose or lesser amount) of antibiotic drug AND [2] NO symptoms (all triage questions negative)    Negative: Caller has medication question only, adult not sick, and triager answers question    Caller has medication question, adult has minor symptoms, caller declines triage, and triager answers question    Protocols used: MED[facility] QUESTION [facility]

## 2017-10-05 ENCOUNTER — TELEPHONE (OUTPATIENT)
Dept: FAMILY MEDICINE | Facility: CLINIC | Age: 32
End: 2017-10-05

## 2017-10-05 NOTE — TELEPHONE ENCOUNTER
Needs of attention regarding:  -Asthma    Health Maintenance Topics with due status: Overdue       Topic Date Due    INFLUENZA VACCINE (SYSTEM ASSIGNED) 09/01/2017    TETANUS Q10 YR 09/12/2017       Communication:  See Letter

## 2017-10-05 NOTE — LETTER
[facility]  [address] [facility] [address] [facility] [id number] [phone]  October 5, 2017    Sayda Valles  [address] [facility] [address]    Dear Sayda,    I care about your health and have reviewed your health plan. I have reviewed your medical conditions, medication list, and lab results and am making recommendations based on this review, to better manage your health.    You are in particular need of attention regarding:  -Asthma    I am recommending that you:  -Complete and return the attached ASTHMA CONTROL TEST.  If your total score is 19 or less or you have been to the ER or urgent care for your asthma, then please schedule an asthma followup appointment.      Here is a list of Health Maintenance topics that are due now or due soon:  Health Maintenance Due   Topic Date Due     INFLUENZA VACCINE (SYSTEM ASSIGNED)  09/01/2017     TETANUS Q10 YR  09/12/2017       Please call us at [phone] (or use [employer]) to address the above recommendations.     Thank you for trusting [facility] and we appreciate the opportunity to serve you.  We look forward to supporting your healthcare needs in the future.    Healthy Regards,    Karen Weiler, MD

## 2017-10-25 ENCOUNTER — TELEPHONE (OUTPATIENT)
Dept: FAMILY MEDICINE | Facility: CLINIC | Age: 32
End: 2017-10-25

## 2017-10-25 NOTE — TELEPHONE ENCOUNTER
"Patient is thinking she may have sprained her ankle playing soft ball.  It is swollen and bruising. \"I know I didn't break it\".  Positive circulation, no numbness or tingling.  She is able to walk on it.  She has not been elevating. She has been working all day.   She is wondering if she has to come in.    She can manage this at home but is welcome to come in if desired.  Elevate, ice, ace wrap, rest.      CB or seek UC or [facility] UC if symptoms increase.  Zuri Ochoa RN- Triage [employer]      "

## 2017-11-02 ENCOUNTER — OFFICE VISIT (OUTPATIENT)
Dept: CARDIOLOGY | Facility: CLINIC | Age: 32
End: 2017-11-02
Attending: INTERNAL MEDICINE
Payer: COMMERCIAL

## 2017-11-02 VITALS
BODY MASS INDEX: 35.94 KG/M2 | SYSTOLIC BLOOD PRESSURE: 126 MMHG | DIASTOLIC BLOOD PRESSURE: 87 MMHG | HEART RATE: 72 BPM | HEIGHT: 65 IN | WEIGHT: 215.7 LBS

## 2017-11-02 DIAGNOSIS — R00.2 HEART PALPITATIONS: ICD-10-CM

## 2017-11-02 PROCEDURE — 99213 OFFICE O/P EST LOW 20 MIN: CPT | Performed by: INTERNAL MEDICINE

## 2017-11-02 NOTE — MR AVS SNAPSHOT
"              After Visit Summary   11/2/2017    Sayda Valles    MRN: [id number]           Patient Information     Date Of Birth          1985        Visit Information        Provider Department      11/2/2017 3:45 PM Thomas Henderson MD [facility]   [place]        Today's Diagnoses     Heart palpitations           Follow-ups after your visit        Who to contact     If you have questions or need follow up information about today's clinic visit or your schedule please contact [facility] directly at [phone].  Normal or non-critical lab and imaging results will be communicated to you by Evehart, letter or phone within 4 business days after the clinic has received the results. If you do not hear from us within 7 days, please contact the clinic through Jinnt or phone. If you have a critical or abnormal lab result, we will notify you by phone as soon as possible.  Submit refill requests through [employer] or call your pharmacy and they will forward the refill request to us. Please allow 3 business days for your refill to be completed.          Additional Information About Your Visit        MyChart Information     [employer] gives you secure access to your electronic health record. If you see a primary care provider, you can also send messages to your care team and make appointments. If you have questions, please call your primary care clinic.  If you do not have a primary care provider, please call [phone] and they will assist you.        Care EveryWhere ID     This is your Care EveryWhere ID. This could be used by other organizations to access your [place] medical records  [id number]        Your Vitals Were     Pulse Height BMI (Body Mass Index)             88 1.651 m (5' 5\") 35.89 kg/m2          Blood Pressure from Last 3 Encounters:   11/02/17 126/87   06/08/17 120/80   05/15/17 112/78    Weight from Last 3 Encounters:   11/02/17 " 97.8 kg (215 lb 11.2 oz)   06/08/17 95.8 kg (211 lb 4.8 oz)   05/15/17 94.8 kg (209 lb)              We Performed the Following     Follow-Up with Cardiologist        Primary Care Provider Office Phone # Fax #    Karen Weiler, [phone] [address]  [place] [address]        Equal Access to Services     [facility] : Hadii aad ku hadasho Soomaali, waaxda luqadaha, qaybta kaalmada adeegyada, waxay idiin hayaan adeeg kharash la'aan ah. So [facility] [phone].    ATENCIÓN: Si habla espblanca, tiene a anderson disposición servicios gratuitos de asistencia lingüística. Llame al [phone].    We comply with applicable federal civil rights laws and [place] laws. We do not discriminate on the basis of race, color, national origin, age, disability, sex, sexual orientation, or gender identity.            Thank you!     Thank you for choosing [facility]  for your care. Our goal is always to provide you with excellent care. Hearing back from our patients is one way we can continue to improve our services. Please take a few minutes to complete the written survey that you may receive in the mail after your visit with us. Thank you!             Your Updated Medication List - Protect others around you: Learn how to safely use, store and throw away your medicines at www.disposemymeds.org.          This list is accurate as of: 11/2/17  4:15 PM.  Always use your most recent med list.                   Brand Name Dispense Instructions for use Diagnosis    albuterol 108 (90 BASE) MCG/ACT Inhaler    PROAIR HFA/PROVENTIL HFA/VENTOLIN HFA    1 Inhaler    Inhale 1-2 puffs into the lungs every 4 hours as needed for shortness of breath / dyspnea or wheezing    Acute bronchitis with coexisting condition requiring prophylactic treatment       ibuprofen 200 MG capsule      Take 200 mg by mouth every 4 hours as needed for fever

## 2017-11-02 NOTE — PROGRESS NOTES
HPI and Plan:   See dictation:[id number]    No orders of the defined types were placed in this encounter.      No orders of the defined types were placed in this encounter.      There are no discontinued medications.      Encounter Diagnosis   Name Primary?     Heart palpitations        CURRENT MEDICATIONS:  Current Outpatient Prescriptions   Medication Sig Dispense Refill     ibuprofen 200 MG capsule Take 200 mg by mouth every 4 hours as needed for fever       albuterol (PROAIR HFA/PROVENTIL HFA/VENTOLIN HFA) 108 (90 BASE) MCG/ACT Inhaler Inhale 1-2 puffs into the lungs every 4 hours as needed for shortness of breath / dyspnea or wheezing 1 Inhaler 0       ALLERGIES     Allergies   Allergen Reactions     Zithromax [Azithromycin Dihydrate] GI Disturbance       PAST MEDICAL HISTORY:  Past Medical History:   Diagnosis Date     Allergic rhinitis, cause unspecified     Allergic rhinitis     Family history of malignant neoplasm of breast     4 generations on her father's side     FAMILY HX BREAST MALIG      Heart palpitations 2/10    PVC's - dr sandhu     Migraine without aura, with intractable migraine, so stated, without mention of status migrainosus     sees neurologist     Mild persistent asthma      Simple goiter 11/25/2008       PAST SURGICAL HISTORY:  Past Surgical History:   Procedure Laterality Date     NO HISTORY OF SURGERY         FAMILY HISTORY:  Family History   Problem Relation Age of Onset     Lipids Father      Neurologic Disorder Father      epilepsy     HEART DISEASE Father 47     MI     Hypertension Father      Hyperlipidemia Father      Lipids Sister      Hyperlipidemia Sister      Breast Cancer Paternal Grandmother      3rd generation      Breast Cancer Paternal Aunt      DIABETES Mother      gestational      Asthma Mother      DIABETES Paternal Grandfather      Other Cancer Maternal Grandfather      lung       SOCIAL HISTORY:  Social History     Social History     Marital status: Single     Spouse name:  "none     Number of children: N/A     Years of education: N/A     Occupational History      at [employer]  Other     Social History Main Topics     Smoking status: Former Smoker     Packs/day: 0.50     Years: 2.00     Types: Cigarettes     Start date: 1999     Quit date: 2/2/2001     Smokeless tobacco: Never Used     Alcohol use Yes      Comment: rarely     Drug use: No     Sexual activity: Yes     Partners: Male     Birth control/ protection: Condom     Other Topics Concern      Service No     Blood Transfusions No     Caffeine Concern No     Occupational Exposure No     Hobby Hazards No     Sleep Concern No     Stress Concern No     Weight Concern Yes     Would like to lose more weight     Special Diet No     Back Care No     Exercise Yes     Moderate     Bike Helmet No     Seat Belt Yes     always      Self-Exams Yes     SBE encouraged monthly      Parent/Sibling W/ Cabg, Mi Or Angioplasty Before 65f 55m? Yes     Social History Narrative    Calcium - 2-3 dairy servings/ day     Menarche: at age 12    Menses: regular        Review of Systems:  Skin:  Negative       Eyes:  Positive for glasses at night for driving  ENT:  Negative      Respiratory:  Positive for dyspnea on exertion asthma?   Cardiovascular:  Negative      Gastroenterology: Positive for heartburn diet related? increased of the last couple months  Genitourinary:  Negative      Musculoskeletal:  Positive for joint pain;back pain    Neurologic:  Positive for numbness or tingling of hands left   Psychiatric:  Positive for anxiety situational  Heme/Lymph/Imm:  Positive for allergies seasonal  Endocrine:  Negative        Physical Exam:  Vitals: /87  Pulse 72  Ht 1.651 m (5' 5\")  Wt 97.8 kg (215 lb 11.2 oz)  BMI 35.89 kg/m2    Constitutional:  cooperative, alert and oriented, well developed, well nourished, in no acute distress        Skin:  warm and dry to the touch, no apparent skin lesions or masses noted      "     Head:  normocephalic, no masses or lesions        Eyes:  pupils equal and round, conjunctivae and lids unremarkable, sclera white, no xanthalasma, EOMS intact, no nystagmus        Lymph:      ENT:  no pallor or cyanosis, dentition good        Neck:  carotid pulses are full and equal bilaterally, JVP normal, no carotid bruit        Respiratory:  normal breath sounds, clear to auscultation, normal A-P diameter, normal symmetry, normal respiratory excursion, no use of accessory muscles         Cardiac: regular rhythm, normal S1/S2, no S3 or S4, apical impulse not displaced, no murmurs, gallops or rubs                pulses full and equal, no bruits auscultated                                        GI:  abdomen soft, non-tender, BS normoactive, no mass, no HSM, no bruits        Extremities and Muscular Skeletal:  no deformities, clubbing, cyanosis, erythema observed;no edema              Neurological:  no gross motor deficits;affect appropriate        Psych:  Alert and Oriented x 3        CC  Thomas Henderson MD  [address]  [place] [id number]

## 2017-11-02 NOTE — LETTER
11/2/2017    Bertha Ravi PA-C  [address]   [address]  [facility] [facility] [address]    RE: Sayda Valles       Dear Colleague,    I had the pleasure of seeing Sayda Valles in the [facility].    PRIMARY CARE PHYSICIAN:  Dr. Karen Weiler.      HISTORY OF PRESENT ILLNESS:  I had the pleasure of seeing your patient, Sayda Valles, at [facility] for evaluation of palpitations.  The patient is a 32-year-old female with multiyear history of palpitations.  She has been seen by my associate, Dr. Evan Page, with evaluation showing no significant arrhythmias and a rare PVC.  She has had normal TSH and renal function.  Lipids are also normal.  She has no history of hypertension.  In the summer of 2016 the patient was using her asthma inhaler more frequently and having more palpitations.  She has used the inhaler very little since that time.  She is a nonsmoker.  She has no history of diabetes or hypertension.  She does not use caffeinated beverages to excess.  She believes she sleeps well and uninterrupted.  Exercise is generally softball but she is thinking about some kickboxing.      PHYSICAL EXAMINATION:  As below.     Outpatient Encounter Prescriptions as of 11/2/2017   Medication Sig Dispense Refill     [DISCONTINUED] ibuprofen 200 MG capsule Take 200 mg by mouth every 4 hours as needed for fever       albuterol (PROAIR HFA/PROVENTIL HFA/VENTOLIN HFA) 108 (90 BASE) MCG/ACT Inhaler Inhale 1-2 puffs into the lungs every 4 hours as needed for shortness of breath / dyspnea or wheezing 1 Inhaler 0     No facility-administered encounter medications on file as of 11/2/2017.       ASSESSMENT:   1.  Sayda Valles is a delightful 32-year-old female with palpitations.  She has not had any further tachyarrhythmias or irregularity to her heartbeat.  She is feeling well at this time.  She has occasional small numbers of palpitations that are not bothersome to her.  For now she is going  to continue to exercise aerobically as able.  She uses her inhaler very little.  I have asked her to return to your office should she experience tachyarrhythmias that are persistent for longer than a few seconds or syncope or presyncope.      It is my pleasure to assist in the care of Sayda Valles.  All her questions were answered to her satisfaction.  She will call for any protracted arrhythmia.     Sincerely,    Thomas Henderson MD     [facility]    cc:   Karen Weiler, MD    [place] [phone]

## 2017-11-03 NOTE — PROGRESS NOTES
PRIMARY CARE PHYSICIAN:  Dr. Karen Weiler.      HISTORY OF PRESENT ILLNESS:  I had the pleasure of seeing your patient, Rohini Valles, at [facility] for evaluation of palpitations.  The patient is a 32-year-old female with multiyear history of palpitations.  She has been seen by my associate, Dr. Evan Page, with evaluation showing no significant arrhythmias and a rare PVC.  She has had normal TSH and renal function.  Lipids are also normal.  She has no history of hypertension.  In the summer of 2016 the patient was using her asthma inhaler more frequently and having more palpitations.  She has used the inhaler very little since that time.  She is a nonsmoker.  She has no history of diabetes or hypertension.  She does not use caffeinated beverages to excess.  She believes she sleeps well and uninterrupted.  Exercise is generally softball but she is thinking about some kickboxing.      PHYSICAL EXAMINATION:  As below.      ASSESSMENT:   1.  Rohini Valles is a delightful 32-year-old female with palpitations.  She has not had any further tachyarrhythmias or irregularity to her heartbeat.  She is feeling well at this time.  She has occasional small numbers of palpitations that are not bothersome to her.  For now she is going to continue to exercise aerobically as able.  She uses her inhaler very little.  I have asked her to return to your office should she experience tachyarrhythmias that are persistent for longer than a few seconds or syncope or presyncope.      It is my pleasure to assist in the care of Rohini Valles.  All her questions were answered to her satisfaction.  She will call for any protracted arrhythmia.      lEly He MD       cc:   Karen Weiler, MD    [place] [id number]         ELLY HE MD, [facility]             D: 11/02/2017 16:21   T: 11/03/2017 01:44   MT: [email or web address]      Name:     ROHINI VALLES   MRN:      [id number]        Account:       [id number]   :      1985           Service Date: 2017      Document: [id number]

## 2017-12-01 ENCOUNTER — OFFICE VISIT (OUTPATIENT)
Dept: FAMILY MEDICINE | Facility: CLINIC | Age: 32
End: 2017-12-01
Payer: COMMERCIAL

## 2017-12-01 ENCOUNTER — RADIANT APPOINTMENT (OUTPATIENT)
Dept: GENERAL RADIOLOGY | Facility: CLINIC | Age: 32
End: 2017-12-01
Attending: PHYSICIAN ASSISTANT
Payer: COMMERCIAL

## 2017-12-01 VITALS
BODY MASS INDEX: 36.32 KG/M2 | WEIGHT: 218 LBS | OXYGEN SATURATION: 100 % | DIASTOLIC BLOOD PRESSURE: 84 MMHG | SYSTOLIC BLOOD PRESSURE: 122 MMHG | HEART RATE: 87 BPM | HEIGHT: 65 IN | TEMPERATURE: 98.5 F

## 2017-12-01 DIAGNOSIS — G43.109 OCULAR MIGRAINE: Primary | ICD-10-CM

## 2017-12-01 DIAGNOSIS — S99.911A INJURY OF RIGHT ANKLE, INITIAL ENCOUNTER: ICD-10-CM

## 2017-12-01 PROCEDURE — 73610 X-RAY EXAM OF ANKLE: CPT | Mod: RT

## 2017-12-01 PROCEDURE — 99214 OFFICE O/P EST MOD 30 MIN: CPT | Performed by: PHYSICIAN ASSISTANT

## 2017-12-01 NOTE — NURSING NOTE
"Chief Complaint   Patient presents with     Headache       Initial /84 (BP Location: Left arm, Patient Position: Chair, Cuff Size: Adult Large)  Pulse 87  Temp 98.5  F (36.9  C) (Oral)  Ht 5' 5\" (1.651 m)  Wt 218 lb (98.9 kg)  SpO2 100%  Breastfeeding? No  BMI 36.28 kg/m2 Estimated body mass index is 36.28 kg/(m^2) as calculated from the following:    Height as of this encounter: 5' 5\" (1.651 m).    Weight as of this encounter: 218 lb (98.9 kg).  Medication Reconciliation: complete   Csaba Mlnarik CMA    "

## 2017-12-01 NOTE — PROGRESS NOTES
SUBJECTIVE:                                                    Sayda Valles is a 32 year old female who presents to clinic today for the following health issues:    4-5 occular migraines in her life - last was 1 weeks ago.  Never had speech problems until this last one could not get words out, jumbling for approx 5 minutes  Also has stiff neck and upper back - has had 2 messages, adjusted by chiro 2x. Main chiropractor would not adjust last night - saw R pupil was 1mm larger than L. No other symptoms.  Does currently has mild HA back of head R side. States feels like tension HA.     Never been on mediations for migraines - worked up for 6 years.    Patient reports that she has had about 4-5 of these headaches in her lifetime, and the first one was about 7 years ago.    She said that last Friday (one week ago) she had the ocular migraine with the visual aura while at work.  She reports that within about 20 minutes those symptoms went away and she had a mild headache, but continued to work.  She says that about 1-1.5 hours later she went home and things were fine, but she noticed when she was on the phone with her dad that her words were mumbled and she could not get them out when trying to talk.  She had one other episode when trying to talk to her daughter as well.  Since Friday night she has not had any issues with auras or headaches.  She went to the chiropractor to adjust her back/shoulders for some shoulder/neck/upper back pain, but the chiropractor would not adjust her as they felt her pupils were sized differently.  Patient said that she saw the different pupils last night as well, and that they came on with an exam the chiropractor was doing, but since then she has not noticed it.  She has a mild tension headache now, but denies any concerns or abnormal symptoms.      She had the migraines worked up about 6 years ago, but does not think she had imaging done.       At the end of the visit, patient requests  "an XR of her foot.  She says that she twisted it or stepped on it funny about 3-4 weeks ago and it is still bothering her with certain movements.      Problem list and histories reviewed & adjusted, as indicated.  Additional history: as documented      ROS:  Constitutional, HEENT, cardiovascular, pulmonary, GI, , musculoskeletal, neuro, skin, endocrine and psych systems are negative, except as otherwise noted.    OBJECTIVE:                                                    /84 (BP Location: Left arm, Patient Position: Chair, Cuff Size: Adult Large)  Pulse 87  Temp 98.5  F (36.9  C) (Oral)  Ht 5' 5\" (1.651 m)  Wt 218 lb (98.9 kg)  SpO2 100%  Breastfeeding? No  BMI 36.28 kg/m2  Body mass index is 36.28 kg/(m^2).  GENERAL: healthy, alert and no distress  EYES: Eyes grossly normal to inspection, PERRL and conjunctivae and sclerae normal  HENT: ear canals and TM's normal, nose and mouth without ulcers or lesions  NECK: no adenopathy, no asymmetry, masses, or scars and thyroid normal to palpation  RESP: lungs clear to auscultation - no rales, rhonchi or wheezes  CV: regular rate and rhythm, normal S1 S2, no S3 or S4, no murmur, click or rub, no peripheral edema and peripheral pulses strong  ABDOMEN: soft, nontender, no hepatosplenomegaly, no masses and bowel sounds normal  MS: no gross musculoskeletal defects noted, no edema  NEURO: Normal strength and tone, mentation intact and speech normal  NEURO: Normal strength and tone, sensory exam grossly normal, mentation intact, cranial nerves 2-12 intact and gait normal including heel/toe/tandem walking  PSYCH: mentation appears normal, affect normal/bright    Diagnostic Test Results:  none      ASSESSMENT/PLAN:                                                      Sayda was seen today for headache.    Diagnoses and all orders for this visit:    Ocular migraine  -     MR Brain w/o Contrast; Future  -     MRA Head w/o Contrast Angiogram; Future    Injury of right " ankle, initial encounter  -     XR Ankle Right G/E 3 Views; Future    - Patient advised to have imaging done as soon as she is able to further evaluate the symptoms of her headaches.    - She is also advised to seek immediate medical attention if symptoms change or worsen in any way.     - No fractures seen on XR of foot.    - Patient advised to avoid aggravating activity and to keep the ankle in a brace when on it for the next 1-2 weeks.    - She should followup if ankle symptoms are not improving.     -- Patient agrees with and understands the plan today.      See Patient Instructions        Bertha Ravi PA-C    [facility] [place]

## 2017-12-01 NOTE — MR AVS SNAPSHOT
After Visit Summary   12/1/2017    Sayda Valles    MRN: [id number]           Patient Information     Date Of Birth          1985        Visit Information        Provider Department      12/1/2017 11:40 AM Bertha Ravi PA-C [facility]'s Diagnoses     Ocular migraine    -  1       Follow-ups after your visit        Future tests that were ordered for you today     Open Future Orders        Priority Expected Expires Ordered    MR Brain w/o Contrast Routine  12/1/2018 12/1/2017    MRA Head w/o Contrast Angiogram Routine  12/1/2018 12/1/2017            Who to contact     If you have questions or need follow up information about today's clinic visit or your schedule please contact [facility] directly at [phone].  Normal or non-critical lab and imaging results will be communicated to you by MyChart, letter or phone within 4 business days after the clinic has received the results. If you do not hear from us within 7 days, please contact the clinic through [employer] or phone. If you have a critical or abnormal lab result, we will notify you by phone as soon as possible.  Submit refill requests through [employer] or call your pharmacy and they will forward the refill request to us. Please allow 3 business days for your refill to be completed.          Additional Information About Your Visit        MyChart Information     [employer] gives you secure access to your electronic health record. If you see a primary care provider, you can also send messages to your care team and make appointments. If you have questions, please call your primary care clinic.  If you do not have a primary care provider, please call [phone] and they will assist you.        Care EveryWhere ID     This is your Care EveryWhere ID. This could be used by other organizations to access your [facility] medical records  [id number]        Your Vitals Were     Pulse Temperature Height  "Pulse Oximetry Breastfeeding? BMI (Body Mass Index)    87 98.5  F (36.9  C) (Oral) 5' 5\" (1.651 m) 100% No 36.28 kg/m2       Blood Pressure from Last 3 Encounters:   12/01/17 122/84   11/02/17 126/87   06/08/17 120/80    Weight from Last 3 Encounters:   12/01/17 218 lb (98.9 kg)   11/02/17 215 lb 11.2 oz (97.8 kg)   06/08/17 211 lb 4.8 oz (95.8 kg)               Primary Care Provider Office Phone # Fax #    Karen Weiler, [phone] [address]  [place] [address]        Equal Access to Services     ARIANNA NUNEZ : Kailee armstrongo Sobryanna, waaxda luqadaha, qaybta kaalmada adeegyada, susan loomis . [facility] [phone].    ATENCIÓN: Si habla español, tiene a anderson disposición servicios gratuitos de asistencia lingüística. [facility] [phone].    We comply with applicable federal civil rights laws and [place] laws. We do not discriminate on the basis of race, color, national origin, age, disability, sex, sexual orientation, or gender identity.            Thank you!     Thank you for choosing [facility]  for your care. Our goal is always to provide you with excellent care. Hearing back from our patients is one way we can continue to improve our services. Please take a few minutes to complete the written survey that you may receive in the mail after your visit with us. Thank you!             Your Updated Medication List - Protect others around you: Learn how to safely use, store and throw away your medicines at www.disposemymeds.org.          This list is accurate as of: 12/1/17 12:45 PM.  Always use your most recent med list.                   Brand Name Dispense Instructions for use Diagnosis    albuterol 108 (90 BASE) MCG/ACT Inhaler    PROAIR HFA/PROVENTIL HFA/VENTOLIN HFA    1 Inhaler    Inhale 1-2 puffs into the lungs every 4 hours as needed for shortness of breath / dyspnea or wheezing    Acute bronchitis with coexisting condition " requiring prophylactic treatment

## 2017-12-02 ASSESSMENT — ASTHMA QUESTIONNAIRES: ACT_TOTALSCORE: 21

## 2017-12-06 ENCOUNTER — TELEPHONE (OUTPATIENT)
Dept: FAMILY MEDICINE | Facility: CLINIC | Age: 32
End: 2017-12-06

## 2017-12-06 ENCOUNTER — HOSPITAL ENCOUNTER (EMERGENCY)
Facility: CLINIC | Age: 32
Discharge: HOME | End: 2017-12-06
Attending: EMERGENCY MEDICINE | Admitting: EMERGENCY MEDICINE
Payer: COMMERCIAL

## 2017-12-06 VITALS
OXYGEN SATURATION: 100 % | TEMPERATURE: 98.3 F | SYSTOLIC BLOOD PRESSURE: 152 MMHG | RESPIRATION RATE: 16 BRPM | DIASTOLIC BLOOD PRESSURE: 108 MMHG | HEART RATE: 90 BPM

## 2017-12-06 DIAGNOSIS — G43.109 MIGRAINE WITH AURA, NOT INTRACTABLE, WITHOUT STATUS MIGRAINOSUS: ICD-10-CM

## 2017-12-06 PROCEDURE — 99282 EMERGENCY DEPT VISIT SF MDM: CPT

## 2017-12-06 ASSESSMENT — ENCOUNTER SYMPTOMS
PHOTOPHOBIA: 1
NAUSEA: 0
SPEECH DIFFICULTY: 1
NECK PAIN: 0
NUMBNESS: 0
HEADACHES: 1
VOMITING: 0
DYSURIA: 0
WEAKNESS: 0
APPETITE CHANGE: 0

## 2017-12-06 NOTE — ED PROVIDER NOTES
"  History     Chief Complaint:  Headache    HPI   Sayda Valles is a 32 year old female with a history of \"ocular migraines\" since 2009 who presents to the emergency department for evaluation of a headache. She believes she has only had about 4 migraines in the past 6 years. The patient reports some mild nasal congestion beginning on Tuesday, 11/21/2017. She then experienced one of her typical ocular migraines on Friday, 11/24/2017, in which she had a \"silver half moon\" blocking her vision followed by intense headache and light sensitivity. However when she was speaking into her phone later in the day, she experienced some sudden difficulty formulating her sentences clearly. This jumbling of words lasted for about 5 minutes and the patient felt a little panicky and shaky afterwards because this was a new symptom for her. She had no other symptoms until today at work when she had another typical ocular migraine which lasted about 40 minutes. The patient did not have any speech problems today. She currently denies any weakness or numbness, nausea, vomiting, dysuria, irregular menstrual cycles, change in appetite, neck pain, or headache other than a very dull mild ache from her migraine earlier today. The patient has no family or personal history of stroke. Of note, the patient has an appointment to have an MRI done next Thursday. The patient does not smoke, has no hx of heart disease, and no history of PCKD or Intracranial aneurysms in the family.     Allergies:  Zithromax [Azithromycin Dihydrate]    Medications:    Albuterol inhaler    Past Medical History:    Allergic rhinitis, cause unspecified   Family history of malignant neoplasm of breast   Heart palpitations   Migraine without aura, with intractable migraine, so stated, without mention of status migrainosus   Mild persistent asthma   Simple goiter    Past Surgical History:    Past surgical history reviewed. No pertinent surgical history.    Family History:  "   Lipids Father   Epilepsy Father   HEART DISEASE Father   Hypertension Father   Hyperlipidemia Father   Lipids Sister   Hyperlipidemia Sister   Breast Cancer Paternal Grandmother   Breast Cancer Paternal Aunt   DIABETES Mother   Asthma Mother   DIABETES Paternal Grandfather   Lung Cancer Maternal Grandfather     Social History:  The patient was accompanied to the emergency department by her friend.  Smoking Status: Former Smoker (0.5 packs/day for 2 years)  Smokeless Tobacco: Never Used  Alcohol Use: Yes  Marital Status: Single     Review of Systems   Constitutional: Negative for appetite change.   Eyes: Positive for photophobia and visual disturbance.   Gastrointestinal: Negative for nausea and vomiting.   Genitourinary: Negative for dysuria and menstrual problem.   Musculoskeletal: Negative for neck pain.   Neurological: Positive for speech difficulty and headaches. Negative for weakness and numbness.   All other systems reviewed and are negative.    Physical Exam     Patient Vitals for the past 24 hrs:   BP Temp Temp src Pulse Resp SpO2   12/06/17 1550 (!) 152/108 98.3  F (36.8  C) Temporal 90 16 100 %     Physical Exam  General:                    Well appearing, nontoxic. Resting comfortably  Head:                                  Scalp, face, and head appear normal  Eyes:                                   Pupils are equal, round, and reactive to light                                              No nystagmus, EOMI                                              Conjunctivae non-injected and sclerae white  ENT:                                    The external nose is normal. Bilateral TMs clear without erythema, bulging, or effusion. Auditory canals normal.                                               Pinnae are normal                                              The oropharynx is normal, mucous membranes moist                                              Posterior pharynx clear without swelling, exudates or  erythema                                              Uvula is in the midline  Neck:                                  Normal range of motion, nontender                                              There is no rigidity noted                                              Trachea is in the midline  CV:                                      Regular rate and rhythm                                               Normal S1/S2, no S3/S4                                              No murmur or rub  Resp:                                  Lungs are clear and equal bilaterally                                              There is no tachypnea                                              No increased work of breathing                                              No rales, wheezing, or rhonchi  GI:                                       Abdomen is soft, no rigidity or guarding                                              No distension                                              No tenderness   MS:                                      Normal muscular tone                                              Symmetric motor strength                                              No lower extremity edema  Skin:                                   No rash or acute skin lesions noted  Neuro:           A&Ox3, GCS 15                                              CN II - XII intact                                              Speech clear, fluent, and normal                                              Strength 5/5 and symmetric in bilateral upper and lower extremities.                                              No pronator drift, no leg drift                                              patellar reflexes 2+ and symmetric, no ankle clonus                                              FTN testing normal. No tremor.                                               Gait normal                                              No meningismus                                               SILT throughout  Psych:                      Normal affect.  Appropriate interactions.    Emergency Department Course     Emergency Department Course:    Nursing notes and vitals reviewed.    I performed an exam of the patient as documented above.     I personally reviewed the physical exam results with the patient and answered all related questions prior to discharge.    Impression & Plan      Medical Decision Making:  Sayda Valles is a 32 year old female who presents for evaluation of history of migraine symptoms. She is currently asymptomatic. She was concerned as a recent episode resulted in transient speech changes which are completely resolved. Her complete neurologic examination in the ED was unremarkable. She is asymptomatic at this time. I feel her symptoms are the result of a complex migraine syndrome. Stroke, TIA, intracranial mass considered but felt to be unlikely given her H&P. She has no infectious symptoms and has no evidence of meningitis or encephalitis. In addition focal or partial seizures and demyelinating disorders should be considered such as MS however given her asymptomatic state at this time and low risk she can continue to work with her PCP or outpatient neurology for this. She has an outpatient MRI scheduled for next week. Given that she has good follow up with outpatient imaging scheduled I would not pursue emergent neuroimaging at this time. Her symptoms are not consistent with ICH or SAH. I discussed return precautions with the patient closely and urged continued outpatient follow up and MRI as scheduled. Patient agreeable with plan of care, questions answered and patient discharged in stable condition.      Diagnosis:    ICD-10-CM    1. Migraine with aura, not intractable, without status migrainosus G43.109      Disposition:   The patient was discharged home.    Scribe Disclosure:  Janessa HAN, am serving as a scribe at 4:20 PM on 12/6/2017 to document  services personally performed by Jay Tucker MD, based on my observations and the provider's statements to me.    [facility] EMERGENCY DEPARTMENT       Jay Tucker MD  12/07/17 [id number]

## 2017-12-06 NOTE — ED AVS SNAPSHOT
[facility] Emergency Department    [address]    [facility] [address]    Phone:  [phone]    Fax:  [phone]                                       Sayda Valles   MRN: [id number]    Department:  [facility] Emergency Department   Date of Visit:  12/6/2017           Patient Information     Date Of Birth          1985        Your diagnoses for this visit were:     Migraine with aura, not intractable, without status migrainosus        You were seen by Jay Tucker MD.      Follow-up Information     Follow up with [facility], [facility]. Schedule an appointment as soon as possible for a visit in 1 week.    Why:  For close follow up    Contact information:    [address] [place] [address]  [phone]          Schedule an appointment as soon as possible for a visit with Luisa Milton MD.    Specialty:  Neurology    Why:  As needed for Neurology Follow up    Contact information:    [facility]  [address] [facility] [address]  [phone]          Discharge Instructions       KEEP YOUR APPOINTMENT FOR YOUR OUTPATIENT MRI TO MAKE SURE YOUR SYMPTOMS ARE NOT CAUSED BY ANY OTHER PROBLEMS. RETURN TO THE ED FOR ANY FACIAL DROOPING, WEAKNESS OR NUMBNESS IN THE BODY, OR WORSENING OF YOUR SYMPTOMS THAT ARE NOT TYPICAL FOR YOUR REGULAR MIGRAINES. YOUR BLOOD PRESSURE WAS MODERATELY ELEVATED TODAY. YOUR PRIMARY CARE PHYSICIAN SHOULD CLOSELY WATCH YOUR BLOOD PRESSURE AS IF IT REMAINS HIGH IT CAN CAUSE SERIOUS PROBLEMS IN THE LONG TERM.     Discharge Instructions  Migraine    You were seen today for a headache that your provider thinks is likely a migraine. At this time your provider does not find that your headache is a sign of anything dangerous or life-threatening.  However, sometimes the signs of serious illness do not show up right away.      Generally, every Emergency Department visit should have a follow-up clinic visit with either a  primary or a specialty clinic/provider. Please follow-up as instructed by your emergency provider today.    Return to the Emergency Department if:    You get a fever of 100.4 F or higher.    You get a stiff neck with your headache.    You get a new headache that is different or worse than headaches you have had before.    You are vomiting (throwing up) and cannot keep food or water down.    You have blurry or double vision or other problems with your eyes.    You have a new weakness on one side of your body.    You have difficulty with balance which is new.    You or your family thinks you are confused.    You have a seizure.    Treatment:    Often, treatment for your migraine will take some time to make you headache stop.  Going home to sleep can be very effective.    Use your medications as directed; overuse of medications can actually cause headaches.    Once your headache has gone away, avoid triggers such as certain foods, skipping meals, bright lights, changes in sleep, exercise and stress.    Migraine headaches can have symptoms before the pain starts, like vision changes, funny smells/tastes, dizziness or other symptoms.    Treating a headache as soon as the first symptoms come on is very important and gives the best chance of stopping the headache.    If headaches are severe or frequent, you may need to start daily medication to prevent the headaches.    Carbon monoxide can cause headaches, so not burning things in your home is important.  Also get a carbon monoxide detector.    Some medications for migraines may raise your blood pressure, so use with caution if you have high blood pressure or heart problems.  If you were given a prescription for medicine here today, be sure to read all of the information (including the package insert) that comes with your prescription.  This will include important information about the medicine, its side effects, and any warnings that you need to know about.  The  pharmacist who fills the prescription can provide more information and answer questions you may have about the medicine.  If you have questions or concerns that the pharmacist cannot address, please call or return to the Emergency Department.   Remember that you can always come back to the Emergency Department if you are not able to see your regular provider in the amount of time listed above, if you get any new symptoms, or if there is anything that worries you.      Future Appointments        Provider Department Dept Phone Center    12/14/2017 10:30 AM [facility] SPECIALTY [facility] MRI ROOM 1 [facility] [phone] [facility]    12/14/2017 11:00 AM [facility] MRI ROOM 1 [facility] [phone] [facility]      24 Hour Appointment Hotline       To make an appointment at any [facility], call [phone] ([phone]). If you don't have a family doctor or clinic, we will help you find one. [facility] are conveniently located to serve the needs of you and your family.             Review of your medicines      Our records show that you are taking the medicines listed below. If these are incorrect, please call your family doctor or clinic.        Dose / Directions Last dose taken    albuterol 108 (90 BASE) MCG/ACT Inhaler   Commonly known as:  PROAIR HFA/PROVENTIL HFA/VENTOLIN HFA   Dose:  1-2 puff   Quantity:  1 Inhaler        Inhale 1-2 puffs into the lungs every 4 hours as needed for shortness of breath / dyspnea or wheezing   Refills:  0                Orders Needing Specimen Collection     None      Pending Results     No orders found from 12/4/2017 to 12/7/2017.            Pending Culture Results     No orders found from 12/4/2017 to 12/7/2017.            Pending Results Instructions     If you had any lab results that were not finalized at the time of your Discharge, you can call the ED Lab Result RN at [phone]. You will be contacted by this team for  any positive Lab results or changes in treatment. The nurses are available 7 days a week from 10A to 6:30P.  You can leave a message 24 hours per day and they will return your call.        Test Results From Your Hospital Stay               Clinical Quality Measure: Blood Pressure Screening     Your blood pressure was checked while you were in the emergency department today. The last reading we obtained was  BP: (!) 152/108 . Please read the guidelines below about what these numbers mean and what you should do about them.  If your systolic blood pressure (the top number) is less than 120 and your diastolic blood pressure (the bottom number) is less than 80, then your blood pressure is normal. There is nothing more that you need to do about it.  If your systolic blood pressure (the top number) is 120-139 or your diastolic blood pressure (the bottom number) is 80-89, your blood pressure may be higher than it should be. You should have your blood pressure rechecked within a year by a primary care provider.  If your systolic blood pressure (the top number) is 140 or greater or your diastolic blood pressure (the bottom number) is 90 or greater, you may have high blood pressure. High blood pressure is treatable, but if left untreated over time it can put you at risk for heart attack, stroke, or kidney failure. You should have your blood pressure rechecked by a primary care provider within the next 4 weeks.  If your provider in the emergency department today gave you specific instructions to follow-up with your doctor or provider even sooner than that, you should follow that instruction and not wait for up to 4 weeks for your follow-up visit.        Thank you for choosing [facility]       Thank you for choosing [facility] for your care. Our goal is always to provide you with excellent care. Hearing back from our patients is one way we can continue to improve our services. Please take a few minutes to complete the written survey  that you may receive in the mail after you visit with us. Thank you!        [employer] Information     [employer] gives you secure access to your electronic health record. If you see a primary care provider, you can also send messages to your care team and make appointments. If you have questions, please call your primary care clinic.  If you do not have a primary care provider, please call [phone] and they will assist you.        Care EveryWhere ID     This is your Care EveryWhere ID. This could be used by other organizations to access your [facility] medical records  [id number]        Equal Access to Services     [facility] : Kailee Quiros, herbert gupta, susan lion. So [facility] [phone].    ATENCIÓN: Si habla español, tiene a anderson disposición servicios gratuitos de asistencia lingüística. Ester al [phone].    We comply with applicable federal civil rights laws and [place] laws. We do not discriminate on the basis of race, color, national origin, age, disability, sex, sexual orientation, or gender identity.            After Visit Summary       This is your record. Keep this with you and show to your community pharmacist(s) and doctor(s) at your next visit.

## 2017-12-06 NOTE — DISCHARGE INSTRUCTIONS
KEEP YOUR APPOINTMENT FOR YOUR OUTPATIENT MRI TO MAKE SURE YOUR SYMPTOMS ARE NOT CAUSED BY ANY OTHER PROBLEMS. RETURN TO THE ED FOR ANY FACIAL DROOPING, WEAKNESS OR NUMBNESS IN THE BODY, OR WORSENING OF YOUR SYMPTOMS THAT ARE NOT TYPICAL FOR YOUR REGULAR MIGRAINES. YOUR BLOOD PRESSURE WAS MODERATELY ELEVATED TODAY. YOUR PRIMARY CARE PHYSICIAN SHOULD CLOSELY WATCH YOUR BLOOD PRESSURE AS IF IT REMAINS HIGH IT CAN CAUSE SERIOUS PROBLEMS IN THE LONG TERM.     Discharge Instructions  Migraine    You were seen today for a headache that your provider thinks is likely a migraine. At this time your provider does not find that your headache is a sign of anything dangerous or life-threatening.  However, sometimes the signs of serious illness do not show up right away.      Generally, every Emergency Department visit should have a follow-up clinic visit with either a primary or a specialty clinic/provider. Please follow-up as instructed by your emergency provider today.    Return to the Emergency Department if:    You get a fever of 100.4 F or higher.    You get a stiff neck with your headache.    You get a new headache that is different or worse than headaches you have had before.    You are vomiting (throwing up) and cannot keep food or water down.    You have blurry or double vision or other problems with your eyes.    You have a new weakness on one side of your body.    You have difficulty with balance which is new.    You or your family thinks you are confused.    You have a seizure.    Treatment:    Often, treatment for your migraine will take some time to make you headache stop.  Going home to sleep can be very effective.    Use your medications as directed; overuse of medications can actually cause headaches.    Once your headache has gone away, avoid triggers such as certain foods, skipping meals, bright lights, changes in sleep, exercise and stress.    Migraine headaches can have symptoms before the pain starts, like  vision changes, funny smells/tastes, dizziness or other symptoms.    Treating a headache as soon as the first symptoms come on is very important and gives the best chance of stopping the headache.    If headaches are severe or frequent, you may need to start daily medication to prevent the headaches.    Carbon monoxide can cause headaches, so not burning things in your home is important.  Also get a carbon monoxide detector.    Some medications for migraines may raise your blood pressure, so use with caution if you have high blood pressure or heart problems.  If you were given a prescription for medicine here today, be sure to read all of the information (including the package insert) that comes with your prescription.  This will include important information about the medicine, its side effects, and any warnings that you need to know about.  The pharmacist who fills the prescription can provide more information and answer questions you may have about the medicine.  If you have questions or concerns that the pharmacist cannot address, please call or return to the Emergency Department.   Remember that you can always come back to the Emergency Department if you are not able to see your regular provider in the amount of time listed above, if you get any new symptoms, or if there is anything that worries you.

## 2017-12-06 NOTE — TELEPHONE ENCOUNTER
Pt calling reporting that they saw KR recently for ocular migraine. Has had 4 in the past 6-7 years.     Pt gets vision impairment, half moon shapes, this lasts 20-30 minutes. Pt also had speech issues with past migraines. Pt noted at work today, they started to have another migraine at 2:05 today. Pt wonders if they should go into the ER sooner than 12/14/17.    N/V- no  Vision changes- yes  Speech impairment- not this time (migraine aura)      Took 3 ibuprofen immediately when they noted the migraine coming on. [phone] pt wants KR to call her back for further recommendations.     Geovanna Hill RN  [facility]

## 2017-12-06 NOTE — ED AVS SNAPSHOT
[facility] Emergency Department    [address]    [facility] [address]    Phone:  [phone]    Fax:  [phone]                                       Sayda Valles   MRN: [id number]    Department:  [facility] Emergency Department   Date of Visit:  12/6/2017           After Visit Summary Signature Page     I have received my discharge instructions, and my questions have been answered. I have discussed any challenges I see with this plan with the nurse or doctor.    ..........................................................................................................................................  Patient/Patient Representative Signature      ..........................................................................................................................................  Patient Representative Print Name and Relationship to Patient    ..................................................               ................................................  Date                                            Time    ..........................................................................................................................................  Reviewed by Signature/Title    ...................................................              ..............................................  Date                                                            Time

## 2017-12-06 NOTE — ED NOTES
Pt arrives to ED with headache reports approx 1 wk ago she had an ocular migraine that involved speech issues. Pt reports today apporx 1440 she had ocular migraine with silver flashers lasted apporx 45 mins took ibuprofen symptoms subsided but is concerned. MRI scheduled 12/14. A/ox 3, ABC's intact. CMS intact.

## 2017-12-07 NOTE — TELEPHONE ENCOUNTER
Pt noted that today they are doing fine.  Pt did receive the call from Bertha last night. Pt went to the ER per triage recommendations as well. Pt is having the MRI on Thursday.    Pt received a referral for neurology from the ER doctor as well.     The ER did tell the pt that they need to have their BP checked with primary.      The pt will come in to the pharmacy here and have this checked tomorrow. Pt will follow up with any further symptoms or concerns.    The patient indicates understanding of these issues and agrees with the plan.  Geovanna Hill RN  [facility]

## 2017-12-07 NOTE — TELEPHONE ENCOUNTER
Tried calling patient back.  She did not answer.      Patient should go to the ER if the character of her migraines change or persist longer than what is typical for her.  If she has any concerns, ER or UC is advised.      Thank you.

## 2017-12-08 ENCOUNTER — HOSPITAL ENCOUNTER (OUTPATIENT)
Dept: MRI IMAGING | Facility: CLINIC | Age: 32
End: 2017-12-08
Attending: PHYSICIAN ASSISTANT
Payer: COMMERCIAL

## 2017-12-08 ENCOUNTER — HOSPITAL ENCOUNTER (OUTPATIENT)
Dept: MRI IMAGING | Facility: CLINIC | Age: 32
Discharge: HOME | End: 2017-12-08
Attending: PHYSICIAN ASSISTANT | Admitting: PHYSICIAN ASSISTANT
Payer: COMMERCIAL

## 2017-12-08 DIAGNOSIS — G43.109 OCULAR MIGRAINE: ICD-10-CM

## 2017-12-08 PROCEDURE — A9585 GADOBUTROL INJECTION: HCPCS | Performed by: PHYSICIAN ASSISTANT

## 2017-12-08 PROCEDURE — 25000128 H RX IP 250 OP 636: Performed by: PHYSICIAN ASSISTANT

## 2017-12-08 PROCEDURE — 70544 MR ANGIOGRAPHY HEAD W/O DYE: CPT

## 2017-12-08 PROCEDURE — 70553 MRI BRAIN STEM W/O & W/DYE: CPT

## 2017-12-08 RX ORDER — GADOBUTROL 604.72 MG/ML
9 INJECTION INTRAVENOUS ONCE
Status: COMPLETED | OUTPATIENT
Start: 2017-12-08 | End: 2017-12-08

## 2017-12-08 RX ADMIN — GADOBUTROL 9 ML: 604.72 INJECTION INTRAVENOUS at 17:27

## 2017-12-10 NOTE — PROGRESS NOTES
Demetri Radford ,    The results from your recent head imaging appear to be within normal limits.    Both the MRI and the MRA of the head appeared to be within normal limits.      Please followup if symptoms are not improving.  Please be seen sooner if symptoms change or worsen in any way.        Thank you for choosing [facility] [place] for your health care needs,      Bertha Ravi PA-C

## 2018-03-07 ENCOUNTER — OFFICE VISIT (OUTPATIENT)
Dept: FAMILY MEDICINE | Facility: CLINIC | Age: 33
End: 2018-03-07
Payer: COMMERCIAL

## 2018-03-07 VITALS
SYSTOLIC BLOOD PRESSURE: 122 MMHG | HEART RATE: 92 BPM | DIASTOLIC BLOOD PRESSURE: 80 MMHG | BODY MASS INDEX: 36.32 KG/M2 | HEIGHT: 65 IN | OXYGEN SATURATION: 100 % | WEIGHT: 218 LBS | TEMPERATURE: 98.4 F

## 2018-03-07 DIAGNOSIS — J45.30 MILD PERSISTENT ASTHMA WITHOUT COMPLICATION: Primary | ICD-10-CM

## 2018-03-07 DIAGNOSIS — Z23 NEED FOR TETANUS BOOSTER: ICD-10-CM

## 2018-03-07 PROCEDURE — 99213 OFFICE O/P EST LOW 20 MIN: CPT | Performed by: PHYSICIAN ASSISTANT

## 2018-03-07 RX ORDER — ALBUTEROL SULFATE 90 UG/1
1-2 AEROSOL, METERED RESPIRATORY (INHALATION) EVERY 4 HOURS PRN
Qty: 1 INHALER | Refills: 1 | Status: SHIPPED | OUTPATIENT
Start: 2018-03-07 | End: 2020-09-22

## 2018-03-07 RX ORDER — FLUTICASONE PROPIONATE 44 UG/1
2 AEROSOL, METERED RESPIRATORY (INHALATION) 2 TIMES DAILY
Qty: 1 INHALER | Refills: 1 | Status: SHIPPED | OUTPATIENT
Start: 2018-03-07 | End: 2019-06-15

## 2018-03-07 NOTE — MR AVS SNAPSHOT
After Visit Summary   3/7/2018    Sayda Valles    MRN: [id number]           Patient Information     Date Of Birth          1985        Visit Information        Provider Department      3/7/2018 10:40 AM [facility]        Today's Diagnoses     Mild persistent asthma without complication    -  1    Need for tetanus booster          Care Instructions    History suggests new dog may be asthma trigger as flare of symptoms corresponds to same timeframe.  Trial of addition of ICS.  Re-check with your asthma specialist.    Electronically Signed By: Anna Posadas PA-C            Follow-ups after your visit        Who to contact     If you have questions or need follow up information about today's clinic visit or your schedule please contact [facility] [place] directly at [phone].  Normal or non-critical lab and imaging results will be communicated to you by MyChart, letter or phone within 4 business days after the clinic has received the results. If you do not hear from us within 7 days, please contact the clinic through [employer] or phone. If you have a critical or abnormal lab result, we will notify you by phone as soon as possible.  Submit refill requests through [employer] or call your pharmacy and they will forward the refill request to us. Please allow 3 business days for your refill to be completed.          Additional Information About Your Visit        MyChart Information     [employer] gives you secure access to your electronic health record. If you see a primary care provider, you can also send messages to your care team and make appointments. If you have questions, please call your primary care clinic.  If you do not have a primary care provider, please call [phone] and they will assist you.        Care EveryWhere ID     This is your Care EveryWhere ID. This could be used by other organizations to access your [facility]  "records  [id number]        Your Vitals Were     Pulse Temperature Height Pulse Oximetry Breastfeeding? BMI (Body Mass Index)    92 98.4  F (36.9  C) (Oral) 5' 5\" (1.651 m) 100% No 36.28 kg/m2       Blood Pressure from Last 3 Encounters:   03/07/18 122/80   12/06/17 (!) 152/108   12/01/17 122/84    Weight from Last 3 Encounters:   03/07/18 218 lb (98.9 kg)   12/01/17 218 lb (98.9 kg)   11/02/17 215 lb 11.2 oz (97.8 kg)              Today, you had the following     No orders found for display         Today's Medication Changes          These changes are accurate as of 3/7/18 11:12 AM.  If you have any questions, ask your nurse or doctor.               Start taking these medicines.        Dose/Directions    fluticasone 44 MCG/ACT Inhaler   Commonly known as:  FLOVENT HFA   Used for:  Mild persistent asthma without complication   Started by:  Anna Posadas PA-C        Dose:  2 puff   Inhale 2 puffs into the lungs 2 times daily   Quantity:  1 Inhaler   Refills:  1            Where to get your medicines      These medications were sent to [facility] [id number]  [place], [place] - [address] AT [facility] &  [address] [place] [address]     Phone:  [phone]     albuterol 108 (90 BASE) MCG/ACT Inhaler    fluticasone 44 MCG/ACT Inhaler                Primary Care Provider Office Phone # Fax #    Bertha Ravi PA-C [phone] [address] [address]        Equal Access to Services     [facility] AH: Hadii sujatha Quiros, waaxda luqadaha, qaybta kaalmada adeegyada, susan cullen. So [facility] [phone].    ATENCIÓN: Si habla español, tiene a anderson disposición servicios gratuitos de asistencia lingüística. Llame al [phone].    We comply with applicable federal civil rights laws and [place] laws. We do not discriminate on the basis of race, color, national origin, age, disability, sex, sexual " orientation, or gender identity.            Thank you!     Thank you for choosing [facility] [place]  for your care. Our goal is always to provide you with excellent care. Hearing back from our patients is one way we can continue to improve our services. Please take a few minutes to complete the written survey that you may receive in the mail after your visit with us. Thank you!             Your Updated Medication List - Protect others around you: Learn how to safely use, store and throw away your medicines at www.disposemymeds.org.          This list is accurate as of 3/7/18 11:12 AM.  Always use your most recent med list.                   Brand Name Dispense Instructions for use Diagnosis    albuterol 108 (90 BASE) MCG/ACT Inhaler    PROAIR HFA/PROVENTIL HFA/VENTOLIN HFA    1 Inhaler    Inhale 1-2 puffs into the lungs every 4 hours as needed for shortness of breath / dyspnea or wheezing    Mild persistent asthma without complication       fluticasone 44 MCG/ACT Inhaler    FLOVENT HFA    1 Inhaler    Inhale 2 puffs into the lungs 2 times daily    Mild persistent asthma without complication

## 2018-03-07 NOTE — PATIENT INSTRUCTIONS
History suggests new dog may be asthma trigger as flare of symptoms corresponds to same timeframe.  Trial of addition of ICS.  Re-check with your asthma specialist.    Electronically Signed By: Anna Posadas PA-C

## 2018-03-07 NOTE — PROGRESS NOTES
SUBJECTIVE:   Sayda Valles is a 32 year old female who presents to clinic today for the following health issues:      Asthma Follow-Up - is having a hard time the past two weeks - just got a new puppy and wonders if this is the trigger for her.  Other pet is hypoallergenic.  Tries not to take her inhaler unless she needs it.  Historically is more exercise induced.  Main symptom is chest tightness and like she can't get a deep breath in.  Did go to the chiropractor to help as carries all of her stress in her upper back.   Periodic cough, but no wheezing.   Used inhaler twice this past week.   Changed her air filter. Trying to put wood floors.    Has seasonal allergies with ragweed - had allergy testing in the past. Has been to Asthma/Allergy in the past, but hasn't done this recently.     Was given flovent in the past, but never used this consistently.     Wondering about taking histoplex vitamin - given info from chiropractor. Let her know I can't speak to this since I am not familiar with it. Has additional food allergy questions and I directed her to her allergist for further discussion about this. Pt in agreement.    Was ACT completed today?    Yes    ACT Total Scores 12/1/2017   ACT TOTAL SCORE -   ASTHMA ER VISITS -   ASTHMA HOSPITALIZATIONS -   ACT TOTAL SCORE (Goal Greater than or Equal to 20) 21   In the past 12 months, how many times did you visit the emergency room for your asthma without being admitted to the hospital? 0   In the past 12 months, how many times were you hospitalized overnight because of your asthma? 0       Recent asthma triggers that patient is dealing with: sometimes its food/gum related so possible a particular sweetener, also just gotten a second dog at home - 2 weeks ago         Amount of exercise or physical activity:     Problems taking medications regularly: No    Medication side effects: none    Diet: regular (no restrictions)      Problem list and histories reviewed & adjusted,  as indicated.  Additional history: as documented    Patient Active Problem List   Diagnosis     Allergic rhinitis     Family history of malignant neoplasm of breast     Encounter for other general counseling or advice on contraception     Varicose Veins of Legs- painful with standing     Abnormal Mammogram- 2/2006 - prob benign      Simple goiter     Heart palpitations     CARDIOVASCULAR SCREENING; LDL GOAL LESS THAN 160     Cervicalgia     Mild persistent asthma     Anxiety     Migraine headache     Non morbid obesity due to excess calories     Past Surgical History:   Procedure Laterality Date     NO HISTORY OF SURGERY         Social History   Substance Use Topics     Smoking status: Former Smoker     Packs/day: 0.50     Years: 2.00     Types: Cigarettes     Start date: 1999     Quit date: 2/2/2001     Smokeless tobacco: Never Used     Alcohol use Yes      Comment: 0-0.5 glasses of wine per month     Family History   Problem Relation Age of Onset     Lipids Father      Neurologic Disorder Father      epilepsy     HEART DISEASE Father 47     MI     Hypertension Father      Hyperlipidemia Father      Lipids Sister      Hyperlipidemia Sister      Breast Cancer Paternal Grandmother      3rd generation      Breast Cancer Paternal Aunt      DIABETES Mother      gestational      Asthma Mother      DIABETES Paternal Grandfather      Other Cancer Maternal Grandfather      lung         Current Outpatient Prescriptions   Medication Sig Dispense Refill     albuterol (PROAIR HFA/PROVENTIL HFA/VENTOLIN HFA) 108 (90 BASE) MCG/ACT Inhaler Inhale 1-2 puffs into the lungs every 4 hours as needed for shortness of breath / dyspnea or wheezing 1 Inhaler 1     fluticasone (FLOVENT HFA) 44 MCG/ACT Inhaler Inhale 2 puffs into the lungs 2 times daily 1 Inhaler 1     [DISCONTINUED] albuterol (PROAIR HFA/PROVENTIL HFA/VENTOLIN HFA) 108 (90 BASE) MCG/ACT Inhaler Inhale 1-2 puffs into the lungs every 4 hours as needed for shortness of breath  "/ dyspnea or wheezing 1 Inhaler 0     Allergies   Allergen Reactions     Zithromax [Azithromycin Dihydrate] GI Disturbance       Reviewed and updated as needed this visit by clinical staff       Reviewed and updated as needed this visit by Provider         ROS:  Constitutional, HEENT, cardiovascular, pulmonary, gi and gu systems are negative, except as otherwise noted.    OBJECTIVE:     /80 (BP Location: Right arm, Cuff Size: Adult Large)  Pulse 92  Temp 98.4  F (36.9  C) (Oral)  Ht 5' 5\" (1.651 m)  Wt 218 lb (98.9 kg)  SpO2 100%  Breastfeeding? No  BMI 36.28 kg/m2  Body mass index is 36.28 kg/(m^2).  GENERAL: healthy, alert and no distress  EYES: Eyes grossly normal to inspection, PERRL and conjunctivae and sclerae normal  HENT: ear canals and TM's normal, nose and mouth without ulcers or lesions  NECK: no adenopathy, no asymmetry, masses.  RESP: lungs clear to auscultation - no rales, rhonchi or wheezes. Perhaps has minimally protracted expiratory phase, but overall breath sounds are clear.  CV: regular rates and rhythm and no murmur, click or rub    Diagnostic Test Results:  ACT = 16 today    ASSESSMENT/PLAN:       ICD-10-CM    1. Mild persistent asthma without complication J45.30 albuterol (PROAIR HFA/PROVENTIL HFA/VENTOLIN HFA) 108 (90 BASE) MCG/ACT Inhaler     fluticasone (FLOVENT HFA) 44 MCG/ACT Inhaler   2. Need for tetanus booster Z23    Mild persistent asthma history with previous script for flovent, but didn't use consistently.  Will resume today as reports flare of asthma over the past 2 weeks since getting a new puppy.  Pt was previously under care of asthma/allergy specialist and plans to follow-up with them either way for re-assessment.  Reviewed there are many triggers for asthma and underlying treatment is to avoid them to minimize degree of medication that we need. Pt is well aware of this and that getting new dog was probably not the best idea.  I agree with her that re-check with " asthma specialist would likely be helpful especially if they plan on keeping this pet.  See Patient Instructions  Pt in agreement with plan.   Patient Instructions   History suggests new dog may be asthma trigger as flare of symptoms corresponds to same timeframe.  Trial of addition of ICS.  Re-check with your asthma specialist.    Electronically Signed By: Anna Posadas PA-C

## 2018-03-07 NOTE — NURSING NOTE
"Chief Complaint   Patient presents with     Asthma       Initial /80 (BP Location: Right arm, Cuff Size: Adult Large)  Pulse 92  Temp 98.4  F (36.9  C) (Oral)  Ht 5' 5\" (1.651 m)  Wt 218 lb (98.9 kg)  SpO2 100%  Breastfeeding? No  BMI 36.28 kg/m2 Estimated body mass index is 36.28 kg/(m^2) as calculated from the following:    Height as of this encounter: 5' 5\" (1.651 m).    Weight as of this encounter: 218 lb (98.9 kg).  Medication Reconciliation: complete    "

## 2018-03-08 ASSESSMENT — ASTHMA QUESTIONNAIRES: ACT_TOTALSCORE: 16

## 2018-06-15 ENCOUNTER — OFFICE VISIT (OUTPATIENT)
Dept: FAMILY MEDICINE | Facility: CLINIC | Age: 33
End: 2018-06-15
Payer: COMMERCIAL

## 2018-06-15 VITALS
OXYGEN SATURATION: 99 % | WEIGHT: 217 LBS | DIASTOLIC BLOOD PRESSURE: 88 MMHG | HEIGHT: 65 IN | TEMPERATURE: 98.3 F | BODY MASS INDEX: 36.15 KG/M2 | SYSTOLIC BLOOD PRESSURE: 132 MMHG | HEART RATE: 87 BPM

## 2018-06-15 DIAGNOSIS — M25.571 RIGHT ANKLE PAIN, UNSPECIFIED CHRONICITY: ICD-10-CM

## 2018-06-15 DIAGNOSIS — J02.9 ACUTE PHARYNGITIS, UNSPECIFIED ETIOLOGY: Primary | ICD-10-CM

## 2018-06-15 DIAGNOSIS — J45.30 MILD PERSISTENT ASTHMA WITHOUT COMPLICATION: ICD-10-CM

## 2018-06-15 DIAGNOSIS — Z23 NEED FOR TETANUS BOOSTER: ICD-10-CM

## 2018-06-15 LAB
DEPRECATED S PYO AG THROAT QL EIA: NORMAL
SPECIMEN SOURCE: NORMAL

## 2018-06-15 PROCEDURE — 87880 STREP A ASSAY W/OPTIC: CPT | Performed by: PHYSICIAN ASSISTANT

## 2018-06-15 PROCEDURE — 87081 CULTURE SCREEN ONLY: CPT | Performed by: PHYSICIAN ASSISTANT

## 2018-06-15 PROCEDURE — 99214 OFFICE O/P EST MOD 30 MIN: CPT | Performed by: PHYSICIAN ASSISTANT

## 2018-06-15 NOTE — MR AVS SNAPSHOT
After Visit Summary   6/15/2018    Sayda Valles    MRN: [id number]           Patient Information     Date Of Birth          1985        Visit Information        Provider Department      6/15/2018 9:40 AM Anna Posadas PA-C [facility]        Today's Diagnoses     Acute pharyngitis, unspecified etiology    -  1    Right ankle pain, unspecified chronicity - off/on issues since October        Need for tetanus booster          Care Instructions    Neg strep.  Will call and notify you should your strep culture return positive and treat accordingly at that time.  May be early virus, would continue with supportive management at this time.  Refer to PT for recurrent ankle issues.  If no improvement, can refer to ortho for further assessment.    Electronically Signed By: Anna Posadas PA-C            Follow-ups after your visit        Additional Services     PHYSICAL THERAPY REFERRAL       *This therapy referral will be filtered to a centralized scheduling office at [facility] and the patient will receive a call to schedule an appointment at a [place] location most convenient for them. *     [facility] provides Physical Therapy evaluation and treatment and many specialty services across the [place] system.  If requesting a specialty program, please choose from the list below.    If you have not heard from the scheduling office within 2 business days, please call [phone] for all locations, with the exception of [place], please call [phone] and [facility], please call [phone]  Treatment: Evaluation & Treatment  Special Instructions/Modalities: per therapist eval  Special Programs: per therapist eval    Please be aware that coverage of these services is subject to the terms and limitations of your health insurance plan.  Call member services at your health plan with any benefit or coverage questions.      **Note to  "Provider:  If you are referring outside of [facility] for the therapy appointment, please list the name of the location in the \"special instructions\" above, print the referral and give to the patient to schedule the appointment.                  Who to contact     If you have questions or need follow up information about today's clinic visit or your schedule please contact [facility] directly at [phone].  Normal or non-critical lab and imaging results will be communicated to you by [employer], letter or phone within 4 business days after the clinic has received the results. If you do not hear from us within 7 days, please contact the clinic through [employer] or phone. If you have a critical or abnormal lab result, we will notify you by phone as soon as possible.  Submit refill requests through [facility] or call your pharmacy and they will forward the refill request to us. Please allow 3 business days for your refill to be completed.          Additional Information About Your Visit        HighlightCam[employer] TV Information     [facility] gives you secure access to your electronic health record. If you see a primary care provider, you can also send messages to your care team and make appointments. If you have questions, please call your primary care clinic.  If you do not have a primary care provider, please call [phone] and they will assist you.        Care EveryWhere ID     This is your Care EveryWhere ID. This could be used by other organizations to access your [facility] medical records  [id number]        Your Vitals Were     Pulse Temperature Height Pulse Oximetry BMI (Body Mass Index)       87 98.3  F (36.8  C) (Oral) 5' 5\" (1.651 m) 99% 36.11 kg/m2        Blood Pressure from Last 3 Encounters:   06/15/18 132/88   03/07/18 122/80   12/06/17 (!) 152/108    Weight from Last 3 Encounters:   06/15/18 217 lb (98.4 kg)   03/07/18 218 lb (98.9 kg)   12/01/17 218 lb (98.9 kg)              We Performed the Following     " Asthma Action Plan (AAP)     Beta strep group A culture     PHYSICAL THERAPY REFERRAL     Strep, Rapid Screen        Primary Care Provider Office Phone # Fax #    Bertha Ravi PA-C [phone]       [facility] [address] [address]        Equal Access to Services     CLEO NUNEZ : Hadii aad ku hadasho Soomaali, waaxda luqadaha, qaybta kaalmada adeegyada, waxay idiin hayaan adederek jun lajovani clulen. So [facility] [phone].    ATENCIÓN: Si habla español, tiene a anderson disposición servicios gratuitos de asistencia lingüística. [facility] [phone].    We comply with applicable federal civil rights laws and [place] laws. We do not discriminate on the basis of race, color, national origin, age, disability, sex, sexual orientation, or gender identity.            Thank you!     Thank you for choosing [facility]  for your care. Our goal is always to provide you with excellent care. Hearing back from our patients is one way we can continue to improve our services. Please take a few minutes to complete the written survey that you may receive in the mail after your visit with us. Thank you!             Your Updated Medication List - Protect others around you: Learn how to safely use, store and throw away your medicines at www.disposemymeds.org.          This list is accurate as of 6/15/18 10:02 AM.  Always use your most recent med list.                   Brand Name Dispense Instructions for use Diagnosis    albuterol 108 (90 Base) MCG/ACT Inhaler    PROAIR HFA/PROVENTIL HFA/VENTOLIN HFA    1 Inhaler    Inhale 1-2 puffs into the lungs every 4 hours as needed for shortness of breath / dyspnea or wheezing    Mild persistent asthma without complication       fluticasone 44 MCG/ACT Inhaler    FLOVENT HFA    1 Inhaler    Inhale 2 puffs into the lungs 2 times daily    Mild persistent asthma without complication

## 2018-06-15 NOTE — PROGRESS NOTES
"  SUBJECTIVE:                                                    Sayda Valles is a 32 year old female who presents to clinic today for the following health issues:        Acute Illness   Acute illness concerns: sore throat  Onset: 1 day - started last night. Paranoid that she was exposed as visited someone in the hospital.  Eating/drinking ok.  No issues with her asthma recently.  Felt that flare in March was more due to allergies and hasn't had any issues with her asthma since then.  Hasn't needed her flovent.       Fever: no    Chills/Sweats: no    Headache (location?): no    Sinus Pressure:no    Conjunctivitis:  no    Ear Pain: no    Rhinorrhea: no - but post nasal drip    Congestion: no    Sore Throat: YES - sore and swollen     Cough: no    Wheeze: no    Decreased Appetite: no    Nausea: no    Vomiting: no    Diarrhea:  no    Dysuria/Freq.: no    Fatigue/Achiness: no    Sick/Strep Exposure: no     Therapies Tried and outcome: ibuprofen 600 mg, allergy medication with some improvement    2) Plays co-ed softball and jammed her R ankle in October.   Evaluted in 12/2017 and had negative xray excluding some swelling and was   Wears an ankle brace during softball.  0/10 pain at rest.  When active with her ankle rates pain laterally as 6/10.  When just walking pain can be 3-4/10.  Will click if she rotates her ankle around, but this isn't painful.  Ankles seem a bit swollen towards the end of the day.   No eval by ortho.   Would like to consider PT.   Occasional her L ankle with bother her too.   Doesn't take anything for it.  Will use \"muscle essential oil sometimes.\"    Problem list and histories reviewed & adjusted, as indicated.  Additional history: as documented    Patient Active Problem List   Diagnosis     Allergic rhinitis     Family history of malignant neoplasm of breast     Encounter for other general counseling or advice on contraception     Varicose Veins of Legs- painful with standing     Abnormal " "Mammogram- 2/2006 - prob benign      Simple goiter     Heart palpitations     CARDIOVASCULAR SCREENING; LDL GOAL LESS THAN 160     Cervicalgia     Anxiety     Migraine headache     Non morbid obesity due to excess calories     Mild persistent asthma without complication     Past Surgical History:   Procedure Laterality Date     NO HISTORY OF SURGERY         Social History   Substance Use Topics     Smoking status: Former Smoker     Packs/day: 0.50     Years: 2.00     Types: Cigarettes     Start date: 1999     Quit date: 2/2/2001     Smokeless tobacco: Never Used     Alcohol use Yes      Comment: 0-0.5 glasses of wine per month     Family History   Problem Relation Age of Onset     Lipids Father      Neurologic Disorder Father      epilepsy     HEART DISEASE Father 47     MI     Hypertension Father      Hyperlipidemia Father      Lipids Sister      Hyperlipidemia Sister      Breast Cancer Paternal Grandmother      3rd generation      Breast Cancer Paternal Aunt      DIABETES Mother      gestational      Asthma Mother      DIABETES Paternal Grandfather      Other Cancer Maternal Grandfather      lung         Current Outpatient Prescriptions   Medication Sig Dispense Refill     albuterol (PROAIR HFA/PROVENTIL HFA/VENTOLIN HFA) 108 (90 BASE) MCG/ACT Inhaler Inhale 1-2 puffs into the lungs every 4 hours as needed for shortness of breath / dyspnea or wheezing 1 Inhaler 1     fluticasone (FLOVENT HFA) 44 MCG/ACT Inhaler Inhale 2 puffs into the lungs 2 times daily 1 Inhaler 1     Allergies   Allergen Reactions     Zithromax [Azithromycin Dihydrate] GI Disturbance       ROS:  Constitutional, HEENT, cardiovascular, pulmonary, gi and gu, MSK, integumentary systems are negative, except as otherwise noted.    OBJECTIVE:     /88 (BP Location: Right arm, Patient Position: Chair, Cuff Size: Adult Large)  Pulse 87  Temp 98.3  F (36.8  C) (Oral)  Ht 5' 5\" (1.651 m)  Wt 217 lb (98.4 kg)  SpO2 99%  BMI 36.11 kg/m2  Body " mass index is 36.11 kg/(m^2).  GENERAL: healthy, alert and no distress  EYES: Eyes grossly normal to inspection, PERRL and conjunctivae and sclerae normal  HENT: ear canals and TM's normal, nose and mouth without ulcers or lesions  NECK: no adenopathy, no asymmetry, masses, or scars and thyroid normal to palpation  RESP: lungs clear to auscultation - no rales, rhonchi or wheezes  CV: regular rates and rhythm, no murmur, click or rub, peripheral pulses strong and no peripheral edema  MS: ambulates without antalgic gait. Slight R lateral ankle when compared to L, but no redness, warmth. Can demonstrate FROM and is not painful today. No distal fibular or base of 5th MTP TTP. No tenderness of lateral ankle ligaments today either.     Diagnostic Test Results:  Strep screen - Negative    ASSESSMENT/PLAN:       ICD-10-CM    1. Acute pharyngitis, unspecified etiology J02.9 Strep, Rapid Screen     Beta strep group A culture   2. Right ankle pain, unspecified chronicity - off/on issues since October M25.571 PHYSICAL THERAPY REFERRAL   3. Need for tetanus booster Z23    4. Mild persistent asthma without complication J45.30    Asthma improved with use of claritin. Pt will continue without changed. ACT now at goal.  Suspect sore throat may be early viral illness as reassuring exam and neg strep.  Given recurrent ankle issues since injury in October agree that PT would likely be of benefit. If acutely worse or fails to improve would advise follow-up with ortho to ensure definitve treatment regimen.   Refuses tetanus as has a softball game tonight, but will return for RN visit for completion.   See Patient Instructions  Pt in agreement with plan.   Patient Instructions   Neg strep.  Will call and notify you should your strep culture return positive and treat accordingly at that time.  May be early virus, would continue with supportive management at this time.  Refer to PT for recurrent ankle issues.  If no improvement, can refer  to ortho for further assessment.    Electronically Signed By: Anna Posadas PA-C

## 2018-06-15 NOTE — PATIENT INSTRUCTIONS
Neg strep.  Will call and notify you should your strep culture return positive and treat accordingly at that time.  May be early virus, would continue with supportive management at this time.  Refer to PT for recurrent ankle issues.  If no improvement, can refer to ortho for further assessment.    Electronically Signed By: Anna Posadas PA-C

## 2018-06-15 NOTE — LETTER
June 18, 2018      Sayda Valles  [address] [facility] [address]        Dear ,    We are writing to inform you of your test results.    Strep culture was normal/negative. No antibiotics are need at this time.     Resulted Orders   Strep, Rapid Screen   Result Value Ref Range    Specimen Description Throat     Rapid Strep A Screen       NEGATIVE: No Group A streptococcal antigen detected by immunoassay, await culture report.   Beta strep group A culture   Result Value Ref Range    Specimen Description Throat     Culture Micro No beta hemolytic Streptococcus Group A isolated        If you have any questions or concerns, please call the clinic at the number listed above.       Sincerely,        Anna Posadas PA-C

## 2018-06-15 NOTE — LETTER
My Asthma Action Plan  Name: Sayda Valles   YOB: 1985  Date: 6/15/2018   My doctor: Anna Posadas PA-C   My clinic: [facility]        My Control Medicine: Fluticasone propionate (Flovent) -  Diskus 50 mcg 2 puffs 2 times daily  My Rescue Medicine: Albuterol (Proair/Ventolin/Proventil) inhaler 1-2 puffs every 4 hours as needed   My Asthma Severity: mild persistent  Avoid your asthma triggers: unknown               GREEN ZONE   Good Control    I feel good    No cough or wheeze    Can work, sleep and play without asthma symptoms       Take your asthma control medicine every day.     1. If exercise triggers your asthma, take your rescue medication    15 minutes before exercise or sports, and    During exercise if you have asthma symptoms  2. Spacer to use with inhaler: If you have a spacer, make sure to use it with your inhaler             YELLOW ZONE Getting Worse  I have ANY of these:    I do not feel good    Cough or wheeze    Chest feels tight    Wake up at night   1. Keep taking your Green Zone medications  2. Start taking your rescue medicine:    every 20 minutes for up to 1 hour. Then every 4 hours for 24-48 hours.  3. If you stay in the Yellow Zone for more than 12-24 hours, contact your doctor.  4. If you do not return to the Green Zone in 12-24 hours or you get worse, start taking your oral steroid medicine if prescribed by your provider.           RED ZONE Medical Alert - Get Help  I have ANY of these:    I feel awful    Medicine is not helping    Breathing getting harder    Trouble walking or talking    Nose opens wide to breathe       1. Take your rescue medicine NOW  2. If your provider has prescribed an oral steroid medicine, start taking it NOW  3. Call your doctor NOW  4. If you are still in the Red Zone after 20 minutes and you have not reached your doctor:    Take your rescue medicine again and    Call 911 or go to the emergency room right away    See your  regular doctor within 2 weeks of an Emergency Room or Urgent Care visit for follow-up treatment.          Annual Reminders:  Meet with Asthma Educator,  Flu Shot in the Fall, consider Pneumonia Vaccination for patients with asthma (aged 19 and older).    Pharmacy:    [facility] [place] [place]  [id number] - [place] [address] [facility] [address] [id number] - [place], [place] - [address] AT [facility] &  [address]                      Asthma Triggers  How To Control Things That Make Your Asthma Worse    Triggers are things that make your asthma worse.  Look at the list below to help you find your triggers and what you can do about them.  You can help prevent asthma flare-ups by staying away from your triggers.      Trigger                                                          What you can do   Cigarette Smoke  Tobacco smoke can make asthma worse. Do not allow smoking in your home, car or around you.  Be sure no one smokes at a child s day care or school.  If you smoke, ask your health care provider for ways to help you quit.  Ask family members to quit too.  Ask your health care provider for a referral to Quit Plan to help you quit smoking, or call 2-120-861-PLAN.     Colds, Flu, Bronchitis  These are common triggers of asthma. Wash your hands often.  Don t touch your eyes, nose or mouth.  Get a flu shot every year.     Dust Mites  These are tiny bugs that live in cloth or carpet. They are too small to see. Wash sheets and blankets in hot water every week.   Encase pillows and mattress in dust mite proof covers.  Avoid having carpet if you can. If you have carpet, vacuum weekly.   Use a dust mask and HEPA vacuum.   Pollen and Outdoor Mold  Some people are allergic to trees, grass, or weed pollen, or molds. Try to keep your windows closed.  Limit time out doors when pollen count is high.   Ask you health care provider about taking medicine during allergy  season.     Animal Dander  Some people are allergic to skin flakes, urine or saliva from pets with fur or feathers. Keep pets with fur or feathers out of your home.    If you can t keep the pet outdoors, then keep the pet out of your bedroom.  Keep the bedroom door closed.  Keep pets off cloth furniture and away from stuffed toys.     Mice, Rats, and Cockroaches  Some people are allergic to the waste from these pests.   Cover food and garbage.  Clean up spills and food crumbs.  Store grease in the refrigerator.   Keep food out of the bedroom.   Indoor Mold  This can be a trigger if your home has high moisture. Fix leaking faucets, pipes, or other sources of water.   Clean moldy surfaces.  Dehumidify basement if it is damp and smelly.   Smoke, Strong Odors, and Sprays  These can reduce air quality. Stay away from strong odors and sprays, such as perfume, powder, hair spray, paints, smoke incense, paint, cleaning products, candles and new carpet.   Exercise or Sports  Some people with asthma have this trigger. Be active!  Ask your doctor about taking medicine before sports or exercise to prevent symptoms.    Warm up for 5-10 minutes before and after sports or exercise.     Other Triggers of Asthma  Cold air:  Cover your nose and mouth with a scarf.  Sometimes laughing or crying can be a trigger.  Some medicines and food can trigger asthma.

## 2018-06-16 LAB
BACTERIA SPEC CULT: NORMAL
SPECIMEN SOURCE: NORMAL

## 2018-06-16 ASSESSMENT — ASTHMA QUESTIONNAIRES: ACT_TOTALSCORE: 22

## 2018-06-16 NOTE — PROGRESS NOTES
Please call or write patient with the following results:    -Strep culture was normal/negative. No antibiotics are need at this time.     Electronically Signed By: Anna Posadas PA-C

## 2018-06-21 ENCOUNTER — TELEPHONE (OUTPATIENT)
Dept: FAMILY MEDICINE | Facility: CLINIC | Age: 33
End: 2018-06-21

## 2018-06-26 ENCOUNTER — THERAPY VISIT (OUTPATIENT)
Dept: PHYSICAL THERAPY | Facility: CLINIC | Age: 33
End: 2018-06-26
Payer: COMMERCIAL

## 2018-06-26 DIAGNOSIS — M25.571 BILATERAL ANKLE PAIN: Primary | ICD-10-CM

## 2018-06-26 DIAGNOSIS — M25.572 BILATERAL ANKLE PAIN: Primary | ICD-10-CM

## 2018-06-26 PROCEDURE — 97161 PT EVAL LOW COMPLEX 20 MIN: CPT | Mod: GP | Performed by: PHYSICAL THERAPIST

## 2018-06-26 PROCEDURE — 97110 THERAPEUTIC EXERCISES: CPT | Mod: GP | Performed by: PHYSICAL THERAPIST

## 2018-06-26 NOTE — MR AVS SNAPSHOT
After Visit Summary   6/26/2018    Sayda Valles    MRN: [id number]           Patient Information     Date Of Birth          1985        Visit Information        Provider Department      6/26/2018 7:40 AM Tomas Meraz PT [facility] [place]        Today's Diagnoses     Bilateral ankle pain    -  1       Follow-ups after your visit        Your next 10 appointments already scheduled     Jul 03, 2018  8:20 AM CDT   [facility] Extremity with Tomas Meraz PT   [facility] [place])    [address] [place] [address]   [phone]            Jul 10, 2018  8:20 AM CDT   HARVINDER Extremity with Tomas Meraz PT   [facility] [place])    [address] [place] [address]   [phone]            Jul 24, 2018  8:20 AM CDT   HARVINDER Extremity with Tomas Meraz PT   [facility] [place])    [address] [place] [address]   [phone]              Who to contact     If you have questions or need follow up information about today's clinic visit or your schedule please contact [facility] [place] directly at [phone].  Normal or non-critical lab and imaging results will be communicated to you by MyChart, letter or phone within 4 business days after the clinic has received the results. If you do not hear from us within 7 days, please contact the clinic through MyChart or phone. If you have a critical or abnormal lab result, we will notify you by phone as soon as possible.  Submit refill requests through [employer] or call your pharmacy and they will forward the refill request to us. Please allow 3 business days for your refill to be completed.          Additional Information About Your Visit        [employer] Information     [employer] gives you secure access to your electronic health record. If you see a primary care provider, you can also send messages to your care team  and make appointments. If you have questions, please call your primary care clinic.  If you do not have a primary care provider, please call [phone] and they will assist you.        Care EveryWhere ID     This is your Care EveryWhere ID. This could be used by other organizations to access your [facility] medical records  [id number]         Blood Pressure from Last 3 Encounters:   06/15/18 132/88   03/07/18 122/80   12/06/17 (!) 152/108    Weight from Last 3 Encounters:   06/15/18 98.4 kg (217 lb)   03/07/18 98.9 kg (218 lb)   12/01/17 98.9 kg (218 lb)              We Performed the Following     HARVINDER Inital Eval Report     PT Eval, Low Complexity ([id number])     Therapeutic Exercises        Primary Care Provider Office Phone # Fax #    Bertha Ravi PA-C [phone]       [address] [place] [address]        Equal Access to Services     [facility] : Hadii aad ku hadasho Soomaali, waaxda luqadaha, qaybta kaalmada adeegyada, waxay idiin hayaan marco antonio loomis . So [facility] [phone].    ATENCIÓN: Si habla español, tiene a anderson disposición servicios gratuitos de asistencia lingüística. [facility] [phone].    We comply with applicable federal civil rights laws and [place] laws. We do not discriminate on the basis of race, color, national origin, age, disability, sex, sexual orientation, or gender identity.            Thank you!     Thank you for choosing [facility]  for your care. Our goal is always to provide you with excellent care. Hearing back from our patients is one way we can continue to improve our services. Please take a few minutes to complete the written survey that you may receive in the mail after your visit with us. Thank you!             Your Updated Medication List - Protect others around you: Learn how to safely use, store and throw away your medicines at www.disposemymeds.org.          This list is accurate as of 6/26/18  8:31 AM.   Always use your most recent med list.                   Brand Name Dispense Instructions for use Diagnosis    albuterol 108 (90 Base) MCG/ACT Inhaler    PROAIR HFA/PROVENTIL HFA/VENTOLIN HFA    1 Inhaler    Inhale 1-2 puffs into the lungs every 4 hours as needed for shortness of breath / dyspnea or wheezing    Mild persistent asthma without complication       fluticasone 44 MCG/ACT Inhaler    FLOVENT HFA    1 Inhaler    Inhale 2 puffs into the lungs 2 times daily    Mild persistent asthma without complication

## 2018-06-26 NOTE — PROGRESS NOTES
[facility] Initial Evaluation  Sayda Valles is a 32 year old  female referred to physical therapy by RADHA Posadas for treatment of R ankle pain with Precautions/Restrictions/MD instructions Eval & Treat       Physical Therapy Initial Evaluation: Subjective History      Injury/Condition Details:  Presenting Complaint B ankle pain, R greater than L   Onset Timing/Date October 2017   Mechanism Softball, running to second base jammed R foot against the base and sprained it.      Symptom Behavior Details    Primary Pain Symptoms Location: Outside of R ankle and top/side of L ankle  Quality: Sharp  Frequency: Intermittent   Worst Pain 7/10   Best Pain 0/10   Symptom Provocators Activity and being on feet, running worse than walking, wearing wedges   Symptom Relievers Ibuprofen and ice   Time of day dependent? Worse at the end of month   Recent symptom change? L ankle has become painful as well      Prior Testing/Intervention for current condition:  Prior Tests Xray in December 2017  IMPRESSION: Lateral soft tissue swelling. Ligament injury is  suspected. No apparent fracture. The ankle mortise appears congruent.   Prior Treatment None      Lifestyle & General Medical History:  General Health Reported by Patient Good   Employment    Usual physical activities  (within past year) Softball, painfree activity   Orthopaedic history None   Notable medical history Asthma, Migraines/Headaches               HPI                    Objective:  System    Ankle/Foot Evaluation  ROM:    AROM:    Dorsiflexion:  Left:   11  Right:   6  Plantarflexion:  Left:  60    Right:  65  Inversion:  Left:  34     Right:  50  Eversion:  20     Right:  10        Strength:    Dorsiflexion:  Left: 5/5     Pain:   Right: 5/5   Pain:  Plantarflexion: Left: 5/5   Pain:   Right: 5/5  Pain:  Inversion:Left: 5/5  Pain:     Right: 5/5  Pain:  Eversion:Left: 5/5  Pain:  Right: 5/5   Pain:                  LIGAMENT TESTING:   Anterior Drawer (ATF) Left: neg   Anterior Drawer (ATF) Right: pos  Posterior Drawer (PTF) Right: neg              EDEMA:   Left ankle edema present at: 52cm  Right ankle edema present at:  53cm      Figure 8 left: 52cmFigure 8 right: 53cm                                                      General     ROS    Assessment/Plan:    Patient is a 32 year old female with both sides ankle complaints.    Patient has the following significant findings with corresponding treatment plan.                Diagnosis 1:  B ankle pain  Pain -  manual therapy, splint/taping/bracing/orthotics, self management, education and home program  Decreased ROM/flexibility - manual therapy, therapeutic exercise and home program  Decreased joint mobility - manual therapy, therapeutic exercise and home program  Edema - cold therapy and self management/home program  Impaired gait - gait training and home program  Impaired muscle performance - neuro re-education and home program  Decreased function - therapeutic activities and home program    Therapy Evaluation Codes:   1) History comprised of:   Personal factors that impact the plan of care:      None.    Comorbidity factors that impact the plan of care are:      Asthma and Migraines/headaches.     Medications impacting care: Anti-inflammatory.  2) Examination of Body Systems comprised of:   Body structures and functions that impact the plan of care:      Ankle.   Activity limitations that impact the plan of care are:      Bending, Jumping, Lifting, Running, Sports, Squatting/kneeling, Stairs, Standing, Walking and Working.  3) Clinical presentation characteristics are:   Stable/Uncomplicated.  4) Decision-Making    Low complexity using standardized patient assessment instrument and/or measureable assessment of functional outcome.  Cumulative Therapy Evaluation is: Low complexity.    Previous and current functional limitations:  (See Goal Flow Sheet for  this information)    Short term and Long term goals: (See Goal Flow Sheet for this information)     Communication ability:  Patient appears to be able to clearly communicate and understand verbal and written communication and follow directions correctly.  Treatment Explanation - The following has been discussed with the patient:   RX ordered/plan of care  Anticipated outcomes  Possible risks and side effects  This patient would benefit from PT intervention to resume normal activities.   Rehab potential is good.    Frequency:  1 X week, once daily  Duration:  for 6 weeks  Discharge Plan:  Achieve all LTG.  Independent in home treatment program.  Reach maximal therapeutic benefit.    Please refer to the daily flowsheet for treatment today, total treatment time and time spent performing 1:1 timed codes.

## 2018-07-03 ENCOUNTER — THERAPY VISIT (OUTPATIENT)
Dept: PHYSICAL THERAPY | Facility: CLINIC | Age: 33
End: 2018-07-03
Payer: COMMERCIAL

## 2018-07-03 DIAGNOSIS — M25.571 BILATERAL ANKLE PAIN: Primary | ICD-10-CM

## 2018-07-03 DIAGNOSIS — M25.572 BILATERAL ANKLE PAIN: Primary | ICD-10-CM

## 2018-07-03 PROCEDURE — 97140 MANUAL THERAPY 1/> REGIONS: CPT | Mod: GP | Performed by: PHYSICAL THERAPIST

## 2018-07-03 PROCEDURE — 97110 THERAPEUTIC EXERCISES: CPT | Mod: GP | Performed by: PHYSICAL THERAPIST

## 2018-07-24 ENCOUNTER — THERAPY VISIT (OUTPATIENT)
Dept: PHYSICAL THERAPY | Facility: CLINIC | Age: 33
End: 2018-07-24
Payer: COMMERCIAL

## 2018-07-24 DIAGNOSIS — M25.572 BILATERAL ANKLE PAIN: Primary | ICD-10-CM

## 2018-07-24 DIAGNOSIS — M25.571 BILATERAL ANKLE PAIN: Primary | ICD-10-CM

## 2018-07-24 PROCEDURE — 97112 NEUROMUSCULAR REEDUCATION: CPT | Mod: GP | Performed by: PHYSICAL THERAPIST

## 2018-07-24 PROCEDURE — 97110 THERAPEUTIC EXERCISES: CPT | Mod: GP | Performed by: PHYSICAL THERAPIST

## 2018-08-28 ENCOUNTER — OFFICE VISIT (OUTPATIENT)
Dept: FAMILY MEDICINE | Facility: CLINIC | Age: 33
End: 2018-08-28
Payer: COMMERCIAL

## 2018-08-28 ENCOUNTER — TELEPHONE (OUTPATIENT)
Dept: FAMILY MEDICINE | Facility: CLINIC | Age: 33
End: 2018-08-28

## 2018-08-28 VITALS
WEIGHT: 225 LBS | TEMPERATURE: 98.1 F | BODY MASS INDEX: 37.49 KG/M2 | HEIGHT: 65 IN | OXYGEN SATURATION: 100 % | DIASTOLIC BLOOD PRESSURE: 86 MMHG | HEART RATE: 94 BPM | SYSTOLIC BLOOD PRESSURE: 124 MMHG

## 2018-08-28 DIAGNOSIS — Z23 NEED FOR TETANUS BOOSTER: ICD-10-CM

## 2018-08-28 DIAGNOSIS — S80.11XA CONTUSION OF RIGHT LOWER EXTREMITY, INITIAL ENCOUNTER: Primary | ICD-10-CM

## 2018-08-28 DIAGNOSIS — Z23 NEED FOR INFLUENZA VACCINATION: ICD-10-CM

## 2018-08-28 PROCEDURE — 99213 OFFICE O/P EST LOW 20 MIN: CPT | Mod: 25 | Performed by: PHYSICIAN ASSISTANT

## 2018-08-28 PROCEDURE — 90471 IMMUNIZATION ADMIN: CPT | Performed by: PHYSICIAN ASSISTANT

## 2018-08-28 PROCEDURE — 90714 TD VACC NO PRESV 7 YRS+ IM: CPT | Performed by: PHYSICIAN ASSISTANT

## 2018-08-28 NOTE — TELEPHONE ENCOUNTER
Musculoskeletal problem/pain      Duration: 8/25/2018    Description  Location: right leg    Intensity:  mild, but very tender to the touch    Accompanying signs and symptoms: numbness in 2-3 area where ball hit, bruising and swelling, bruising is going down in to ankle and also around to calf    History  Previous similar problem: no   Previous evaluation:  x-ray and was seen in urgent care on 8/25/2018    Precipitating or alleviating factors:  Trauma or overuse: YES- was hit with line drive by a softball  Aggravating factors include: flexing foot, touching    Therapies tried and outcome: ice         She as seen in urgent care on 8/25/2018.  X-ray was completed and no fracture to area.  She said she has 2-3 inch loss of feeling in area where ball hit her leg.      RN advised office for evaluation.    Patient scheduled at [facility] this afternoon.    Patient verbalized understanding and agreed with plan.    ESTELA Corrales, RN, PHN  [facility] Triage  ) [phone]

## 2018-08-28 NOTE — PATIENT INSTRUCTIONS
History/exam consistent with contusion.  Continue to elevate, ice and this should slowly improve over time.  Suspect the numbness is due to local trauma as has no evidence of compartment syndrome.  Re-check anytime with acute worsening or concerns, but reassuring you're already noticing improvement.

## 2018-08-28 NOTE — TELEPHONE ENCOUNTER
Reason for call:  Patient reporting a symptom    Symptom or request: Softball to leg     Duration (how long have symptoms been present): 8/25/18    Have you been treated for this before? No    Additional comments: Patient was playing softball was hit in shin with a ball, has brusing that is now expanding down toward the ankle. No feeling in area where ball hit.     Phone Number patient can be reached at:  Home number on file [phone] (home)    Best Time:  Anytime     Can we leave a detailed message on this number:  YES    Call taken on 8/28/2018 at 10:47 AM by Promise Helm

## 2018-08-28 NOTE — PROGRESS NOTES
SUBJECTIVE:   Sayda Valles is a 32 year old female who presents to clinic today for the following health issues:         Musculoskeletal problem/pain       Duration: 8/25/2018    Description  Location: right leg    Intensity:  mild, but very tender to the touch    2/10 pain at baseline.    Pain has improved significantly - really only bothers her now if she pushes on it.    Didn't notice lack of sensation until she was itching this region yesterday.       Accompanying signs and symptoms: numbness in 2-3 area where ball hit, bruising and swelling, bruising is going down in to ankle and also around to calf    History  Previous similar problem: no   Previous evaluation:  x-ray and was seen in urgent care on 8/25/2018    Precipitating or alleviating factors:  Trauma or overuse: YES- was hit with line drive by a softball  Aggravating factors include: flexing foot, touching    Therapies tried and outcome: ice and has been trying to elevate it.         She as seen in urgent care on 8/25/2018.  X-ray was completed and no fracture to area.  She said she has 2-3 inch loss of feeling in area where ball hit her leg.      Has suffered with recurrent ankle issues. Has noticed a bit of swelling or L ankle since this, but has also had a little of her R so not sure it's related. Under care of PT for chronic ankle issues.                   Problem list and histories reviewed & adjusted, as indicated.  Additional history: as documented    Patient Active Problem List   Diagnosis     Allergic rhinitis     Family history of malignant neoplasm of breast     Encounter for other general counseling or advice on contraception     Varicose Veins of Legs- painful with standing     Abnormal Mammogram- 2/2006 - prob benign      Simple goiter     Heart palpitations     CARDIOVASCULAR SCREENING; LDL GOAL LESS THAN 160     Cervicalgia     Anxiety     Migraine headache     Non morbid obesity due to excess calories     Mild persistent asthma without  "complication     Bilateral ankle pain     Past Surgical History:   Procedure Laterality Date     NO HISTORY OF SURGERY         Social History   Substance Use Topics     Smoking status: Former Smoker     Packs/day: 0.50     Years: 2.00     Types: Cigarettes     Start date: 1999     Quit date: 2/2/2001     Smokeless tobacco: Never Used     Alcohol use Yes      Comment: 0-0.5 glasses of wine per month     Family History   Problem Relation Age of Onset     Lipids Father      Neurologic Disorder Father      epilepsy     HEART DISEASE Father 47     MI     Hypertension Father      Hyperlipidemia Father      Lipids Sister      Hyperlipidemia Sister      Breast Cancer Paternal Grandmother      3rd generation      Breast Cancer Paternal Aunt      Diabetes Mother      gestational      Asthma Mother      Diabetes Paternal Grandfather      Other Cancer Maternal Grandfather      lung         Current Outpatient Prescriptions   Medication Sig Dispense Refill     albuterol (PROAIR HFA/PROVENTIL HFA/VENTOLIN HFA) 108 (90 BASE) MCG/ACT Inhaler Inhale 1-2 puffs into the lungs every 4 hours as needed for shortness of breath / dyspnea or wheezing 1 Inhaler 1     fluticasone (FLOVENT HFA) 44 MCG/ACT Inhaler Inhale 2 puffs into the lungs 2 times daily 1 Inhaler 1     Allergies   Allergen Reactions     Zithromax [Azithromycin Dihydrate] GI Disturbance       Reviewed and updated as needed this visit by clinical staff       Reviewed and updated as needed this visit by Provider         ROS:  Constitutional, HEENT, cardiovascular, pulmonary, MSK, integumentary systems are negative, except as otherwise noted.    OBJECTIVE:     /86 (BP Location: Right arm, Cuff Size: Adult Large)  Pulse 94  Temp 98.1  F (36.7  C) (Oral)  Ht 5' 5\" (1.651 m)  Wt 225 lb (102.1 kg)  SpO2 100%  Breastfeeding? No  BMI 37.44 kg/m2  Body mass index is 37.44 kg/(m^2).  GENERAL: healthy, alert and no distress  MS: pt can ambulate with non-antalgic gait and has " no limitation of R lower extremity  SKIN/NEURO: she points out a slightly red central area that measures [id number] where maximal impact of softball hit her R vicente-medial mid shin. This is the exact region where she feels decreased sensation to light touch. She can feel pressure with deeper palpation, but this obviously incites pain so was not otherwise palpated more deeply. This area is also swollen c/w a soft tissue contusion. She also has a large area measuring [id number] of varying severities of bruising that expands from epicenter of contusion noted above. Sensation to remainder of leg is intact. There is no pallor of her skin.   VASC: normal DP and PT. Cap refill intact.     Diagnostic Test Results:  none     ASSESSMENT/PLAN:       ICD-10-CM    1. Contusion of right lower extremity, initial encounter S80.11XA    2. Need for tetanus booster Z23 TD (ADULT, 7+) PRESERVE FREE   3. Need for influenza vaccination Z23    See Patient Instructions  Pt in agreement with plan.   Patient Instructions   History/exam consistent with contusion.  Continue to elevate, ice and this should slowly improve over time.  Suspect the numbness is due to local trauma as has no evidence of compartment syndrome.  Re-check anytime with acute worsening or concerns, but reassuring you're already noticing improvement.    Anna Persaud PA-C  [facility]

## 2018-08-28 NOTE — MR AVS SNAPSHOT
After Visit Summary   8/28/2018    Sayda Valles    MRN: [id number]           Patient Information     Date Of Birth          1985        Visit Information        Provider Department      8/28/2018 2:40 PM Anna Persaud PA-C [facility]        Today's Diagnoses     Contusion of right lower extremity, initial encounter    -  1    Need for tetanus booster        Need for influenza vaccination          Care Instructions    History/exam consistent with contusion.  Continue to elevate, ice and this should slowly improve over time.  Suspect the numbness is due to local trauma as has no evidence of compartment syndrome.  Re-check anytime with acute worsening or concerns, but reassuring you're already noticing improvement.          Follow-ups after your visit        Follow-up notes from your care team     Return in about 1 month (around 9/28/2018) for Routine Visit.      Who to contact     If you have questions or need follow up information about today's clinic visit or your schedule please contact [facility] directly at [phone].  Normal or non-critical lab and imaging results will be communicated to you by STinserhart, letter or phone within 4 business days after the clinic has received the results. If you do not hear from us within 7 days, please contact the clinic through HopeLabt or phone. If you have a critical or abnormal lab result, we will notify you by phone as soon as possible.  Submit refill requests through PerfectHitch or call your pharmacy and they will forward the refill request to us. Please allow 3 business days for your refill to be completed.          Additional Information About Your Visit        STinserhart Information     PerfectHitch gives you secure access to your electronic health record. If you see a primary care provider, you can also send messages to your care team and make appointments. If you have questions, please call your primary care clinic.  If  "you do not have a primary care provider, please call [phone] and they will assist you.        Care EveryWhere ID     This is your Care EveryWhere ID. This could be used by other organizations to access your [facility] medical records  [id number]        Your Vitals Were     Pulse Temperature Height Pulse Oximetry Breastfeeding? BMI (Body Mass Index)    94 98.1  F (36.7  C) (Oral) 5' 5\" (1.651 m) 100% No 37.44 kg/m2       Blood Pressure from Last 3 Encounters:   08/28/18 124/86   06/15/18 132/88   03/07/18 122/80    Weight from Last 3 Encounters:   08/28/18 225 lb (102.1 kg)   06/15/18 217 lb (98.4 kg)   03/07/18 218 lb (98.9 kg)              Today, you had the following     No orders found for display       Primary Care Provider Office Phone # Fax #    Bertha Ravi PA-C [phone]       [address] [place] [address]        Equal Access to Services     [facility]: Hadii aad ku hadasho Sobryanna, waaxda luqadaha, qaybta kaalmajurgen see, susan loomis . So [facility] [phone].    ATENCIÓN: Si habla español, tiene a anderson disposición servicios gratuitos de asistencia lingüística. [facility] [phone].    We comply with applicable federal civil rights laws and [place] laws. We do not discriminate on the basis of race, color, national origin, age, disability, sex, sexual orientation, or gender identity.            Thank you!     Thank you for choosing [facility]  for your care. Our goal is always to provide you with excellent care. Hearing back from our patients is one way we can continue to improve our services. Please take a few minutes to complete the written survey that you may receive in the mail after your visit with us. Thank you!             Your Updated Medication List - Protect others around you: Learn how to safely use, store and throw away your medicines at www.disposemymeds.org.          This list is accurate as of 8/28/18  " 3:23 PM.  Always use your most recent med list.                   Brand Name Dispense Instructions for use Diagnosis    albuterol 108 (90 Base) MCG/ACT inhaler    PROAIR HFA/PROVENTIL HFA/VENTOLIN HFA    1 Inhaler    Inhale 1-2 puffs into the lungs every 4 hours as needed for shortness of breath / dyspnea or wheezing    Mild persistent asthma without complication       fluticasone 44 MCG/ACT Inhaler    FLOVENT HFA    1 Inhaler    Inhale 2 puffs into the lungs 2 times daily    Mild persistent asthma without complication

## 2018-09-19 ENCOUNTER — OFFICE VISIT (OUTPATIENT)
Dept: FAMILY MEDICINE | Facility: CLINIC | Age: 33
End: 2018-09-19
Payer: COMMERCIAL

## 2018-09-19 VITALS
HEIGHT: 65 IN | DIASTOLIC BLOOD PRESSURE: 76 MMHG | TEMPERATURE: 97.9 F | SYSTOLIC BLOOD PRESSURE: 104 MMHG | WEIGHT: 223 LBS | OXYGEN SATURATION: 99 % | BODY MASS INDEX: 37.15 KG/M2 | HEART RATE: 89 BPM

## 2018-09-19 DIAGNOSIS — Z80.3 FAMILY HISTORY OF BREAST CANCER: ICD-10-CM

## 2018-09-19 DIAGNOSIS — L98.9 SKIN LESION: ICD-10-CM

## 2018-09-19 DIAGNOSIS — Z12.31 ENCOUNTER FOR SCREENING MAMMOGRAM FOR BREAST CANCER: ICD-10-CM

## 2018-09-19 DIAGNOSIS — R92.8 ABNORMAL MAMMOGRAM: ICD-10-CM

## 2018-09-19 DIAGNOSIS — Z13.1 SCREENING FOR DIABETES MELLITUS: ICD-10-CM

## 2018-09-19 DIAGNOSIS — G43.109 MIGRAINE WITH AURA AND WITHOUT STATUS MIGRAINOSUS, NOT INTRACTABLE: ICD-10-CM

## 2018-09-19 DIAGNOSIS — Z13.29 SCREENING FOR THYROID DISORDER: ICD-10-CM

## 2018-09-19 DIAGNOSIS — E66.812 CLASS 2 OBESITY DUE TO EXCESS CALORIES WITHOUT SERIOUS COMORBIDITY WITH BODY MASS INDEX (BMI) OF 37.0 TO 37.9 IN ADULT: ICD-10-CM

## 2018-09-19 DIAGNOSIS — Z00.01 ENCOUNTER FOR ROUTINE ADULT HEALTH EXAMINATION WITH ABNORMAL FINDINGS: Primary | ICD-10-CM

## 2018-09-19 DIAGNOSIS — Z23 NEED FOR INFLUENZA VACCINATION: ICD-10-CM

## 2018-09-19 DIAGNOSIS — S80.11XD CONTUSION OF RIGHT LOWER EXTREMITY, SUBSEQUENT ENCOUNTER: ICD-10-CM

## 2018-09-19 DIAGNOSIS — Z13.6 CARDIOVASCULAR SCREENING; LDL GOAL LESS THAN 160: ICD-10-CM

## 2018-09-19 DIAGNOSIS — E66.09 CLASS 2 OBESITY DUE TO EXCESS CALORIES WITHOUT SERIOUS COMORBIDITY WITH BODY MASS INDEX (BMI) OF 37.0 TO 37.9 IN ADULT: ICD-10-CM

## 2018-09-19 DIAGNOSIS — J06.9 UPPER RESPIRATORY TRACT INFECTION, UNSPECIFIED TYPE: ICD-10-CM

## 2018-09-19 LAB
ALBUMIN SERPL-MCNC: 3.6 G/DL (ref 3.4–5)
ALP SERPL-CCNC: 55 U/L (ref 40–150)
ALT SERPL W P-5'-P-CCNC: 23 U/L (ref 0–50)
ANION GAP SERPL CALCULATED.3IONS-SCNC: 6 MMOL/L (ref 3–14)
AST SERPL W P-5'-P-CCNC: 19 U/L (ref 0–45)
BILIRUB SERPL-MCNC: 0.6 MG/DL (ref 0.2–1.3)
BUN SERPL-MCNC: 9 MG/DL (ref 7–30)
CALCIUM SERPL-MCNC: 8.3 MG/DL (ref 8.5–10.1)
CHLORIDE SERPL-SCNC: 107 MMOL/L (ref 94–109)
CHOLEST SERPL-MCNC: 141 MG/DL
CO2 SERPL-SCNC: 26 MMOL/L (ref 20–32)
CREAT SERPL-MCNC: 0.7 MG/DL (ref 0.52–1.04)
GFR SERPL CREATININE-BSD FRML MDRD: >90 ML/MIN/1.7M2
GLUCOSE SERPL-MCNC: 86 MG/DL (ref 70–99)
HDLC SERPL-MCNC: 40 MG/DL
LDLC SERPL CALC-MCNC: 83 MG/DL
NONHDLC SERPL-MCNC: 101 MG/DL
POTASSIUM SERPL-SCNC: 4.7 MMOL/L (ref 3.4–5.3)
PROT SERPL-MCNC: 7.1 G/DL (ref 6.8–8.8)
SODIUM SERPL-SCNC: 139 MMOL/L (ref 133–144)
TRIGL SERPL-MCNC: 88 MG/DL

## 2018-09-19 PROCEDURE — 99213 OFFICE O/P EST LOW 20 MIN: CPT | Mod: 25 | Performed by: PHYSICIAN ASSISTANT

## 2018-09-19 PROCEDURE — 80053 COMPREHEN METABOLIC PANEL: CPT | Performed by: PHYSICIAN ASSISTANT

## 2018-09-19 PROCEDURE — 36415 COLL VENOUS BLD VENIPUNCTURE: CPT | Performed by: PHYSICIAN ASSISTANT

## 2018-09-19 PROCEDURE — 80061 LIPID PANEL: CPT | Performed by: PHYSICIAN ASSISTANT

## 2018-09-19 PROCEDURE — 84443 ASSAY THYROID STIM HORMONE: CPT | Performed by: PHYSICIAN ASSISTANT

## 2018-09-19 PROCEDURE — 99395 PREV VISIT EST AGE 18-39: CPT | Performed by: PHYSICIAN ASSISTANT

## 2018-09-19 RX ORDER — LORATADINE 10 MG/1
10 TABLET ORAL DAILY
COMMUNITY

## 2018-09-19 NOTE — MR AVS SNAPSHOT
After Visit Summary   9/19/2018    Sayda Valles    MRN: [id number]           Patient Information     Date Of Birth          1985        Visit Information        Provider Department      9/19/2018 8:40 AM Anna Persaud PA-C [facility]        Today's Diagnoses     Contusion of right lower extremity, subsequent encounter    -  1    Class 2 obesity due to excess calories without serious comorbidity with body mass index (BMI) of 37.0 to 37.9 in adult        Screening for diabetes mellitus        CARDIOVASCULAR SCREENING; LDL GOAL LESS THAN 160        Family history of breast cancer - paternal grandma, paternal aunt and paternal great-grandmother. Dad completed genetic screening which was neg.         Abnormal Mammogram- 2/2006 - prob benign; pt never completed 6 month recheck.        Encounter for screening mammogram for breast cancer        Screening for thyroid disorder        Skin lesion        Upper respiratory tract infection, unspecified type        Need for influenza vaccination - will return when feeling better.          Care Instructions      Preventive Health Recommendations  Female Ages 26 - 39  Yearly exam:   See your health care provider every year in order to    Review health changes.     Discuss preventive care.      Review your medicines if you your doctor has prescribed any.    Until age 30: Get a Pap test every three years (more often if you have had an abnormal result).    After age 30: Talk to your doctor about whether you should have a Pap test every 3 years or have a Pap test with HPV screening every 5 years.   You do not need a Pap test if your uterus was removed (hysterectomy) and you have not had cancer.  You should be tested each year for STDs (sexually transmitted diseases), if you're at risk.   Talk to your provider about how often to have your cholesterol checked.  If you are at risk for diabetes, you should have a diabetes test (fasting  glucose).  Shots: Get a flu shot each year. Get a tetanus shot every 10 years.   Nutrition:     Eat at least 5 servings of fruits and vegetables each day.    Eat whole-grain bread, whole-wheat pasta and brown rice instead of white grains and rice.    Get adequate Calcium and Vitamin D.     Lifestyle    Exercise at least 150 minutes a week (30 minutes a day, 5 days of the week). This will help you control your weight and prevent disease.    Limit alcohol to one drink per day.    No smoking.     Wear sunscreen to prevent skin cancer.    See your dentist every six months for an exam and cleaning.            Follow-ups after your visit        Additional Services     ORTHO  REFERRAL       [facility] is referring you to the Orthopedic  Services at [facility].       The  Representative will assist you in the coordination of your Orthopedic and Musculoskeletal Care as prescribed by your physician.    The  Representative will call you within 1 business day to help schedule your appointment, or you may contact the  Representative at:    All areas ~ [phone]     Type of Referral : Non Surgical / Sport Medicine       Timeframe requested: Routine    Coverage of these services is subject to the terms and limitations of your health insurance plan.  Please call member services at your health plan with any benefit or coverage questions.      If X-rays, CT or MRI's have been performed, please contact the facility where they were done to arrange for , prior to your scheduled appointment.  Please bring this referral request to your appointment and present it to your specialist.            SKIN CARE REFERRAL       Your provider has referred you to: [place]: [facility] [place] [phone]  [email or web address]     Please be aware that coverage of these services is subject to the terms  and limitations of your health insurance plan.  Please check with your insurance prior to the appointment to ensure appropriate coverage for any services considered cosmetic in nature or not medically necessary.    Please bring the following with you to your appointment:    (1) Any X-Rays, CTs or MRIs which have been performed.  Contact the facility where they were done to arrange for  prior to your scheduled appointment.  Any new CT, MRI or other procedures ordered by your specialist must be performed at a [facility] facility or coordinated by your clinic's referral office.  (2) List of current medications  (3) This referral request   (4) Any documents/labs given to you for this referral                  Follow-up notes from your care team     Return in about 1 year (around 9/19/2019) for Routine Visit.      Future tests that were ordered for you today     Open Future Orders        Priority Expected Expires Ordered    MA Screen Bilateral w/Ata Routine  9/19/2019 9/19/2018            Who to contact     If you have questions or need follow up information about today's clinic visit or your schedule please contact [facility] directly at [phone].  Normal or non-critical lab and imaging results will be communicated to you by Decision Diagnosticshart, letter or phone within 4 business days after the clinic has received the results. If you do not hear from us within 7 days, please contact the clinic through Decision Diagnosticshart or phone. If you have a critical or abnormal lab result, we will notify you by phone as soon as possible.  Submit refill requests through Mayday PAC or call your pharmacy and they will forward the refill request to us. Please allow 3 business days for your refill to be completed.          Additional Information About Your Visit        Mayday PAC Information     Mayday PAC gives you secure access to your electronic health record. If you see a primary care provider, you can also send messages to your care team and  "make appointments. If you have questions, please call your primary care clinic.  If you do not have a primary care provider, please call [phone] and they will assist you.        Care EveryWhere ID     This is your Care EveryWhere ID. This could be used by other organizations to access your [facility] medical records  [id number]        Your Vitals Were     Pulse Temperature Height Pulse Oximetry BMI (Body Mass Index)       89 97.9  F (36.6  C) (Oral) 5' 5\" (1.651 m) 99% 37.11 kg/m2        Blood Pressure from Last 3 Encounters:   09/19/18 104/76   08/28/18 124/86   06/15/18 132/88    Weight from Last 3 Encounters:   09/19/18 223 lb (101.2 kg)   08/28/18 225 lb (102.1 kg)   06/15/18 217 lb (98.4 kg)              We Performed the Following     Comprehensive metabolic panel     Lipid panel reflex to direct LDL Fasting     ORTHO  REFERRAL     SKIN CARE REFERRAL     TSH with free T4 reflex        Primary Care Provider Office Phone # Fax #    Bertha Ravi PA-C [phone]       [address] [place] [address]        Equal Access to Services     [facility]: Hadii sujatha saxena hadasho Soomaali, waaxda luqadaha, qaybta kaalmada adeegyada, susan cullen. So [facility] [phone].    ATENCIÓN: Si habla español, tiene a anderson disposición servicios gratuitos de asistencia lingüística. Ester al [phone].    We comply with applicable federal civil rights laws and [place] laws. We do not discriminate on the basis of race, color, national origin, age, disability, sex, sexual orientation, or gender identity.            Thank you!     Thank you for choosing [facility]  for your care. Our goal is always to provide you with excellent care. Hearing back from our patients is one way we can continue to improve our services. Please take a few minutes to complete the written survey that you may receive in the mail after your visit with us. Thank you!      "        Your Updated Medication List - Protect others around you: Learn how to safely use, store and throw away your medicines at www.disposemymeds.org.          This list is accurate as of 9/19/18  9:28 AM.  Always use your most recent med list.                   Brand Name Dispense Instructions for use Diagnosis    albuterol 108 (90 Base) MCG/ACT inhaler    PROAIR HFA/PROVENTIL HFA/VENTOLIN HFA    1 Inhaler    Inhale 1-2 puffs into the lungs every 4 hours as needed for shortness of breath / dyspnea or wheezing    Mild persistent asthma without complication       fluticasone 44 MCG/ACT Inhaler    FLOVENT HFA    1 Inhaler    Inhale 2 puffs into the lungs 2 times daily    Mild persistent asthma without complication       loratadine 10 MG tablet    CLARITIN     Take 10 mg by mouth daily

## 2018-09-19 NOTE — PROGRESS NOTES
SUBJECTIVE:   CC: Sayda Valles is an 32 year old woman who presents for preventive health visit.   Answers for HPI/ROS submitted by the patient on 9/19/2018   Annual Exam:  Getting at least 3 servings of Calcium per day:: Yes  Bi-annual eye exam:: Yes  Dental care twice a year:: Yes  Sleep apnea or symptoms of sleep apnea:: Daytime drowsiness  Frequency of exercise:: 1 day/week  Taking medications regularly:: Yes  Additional concerns today:: YES  PHQ-2 Score: 0  Duration of exercise:: 45-60 minutes      1) Re-check leg. Originally contusion injury to R anterior shin when softball struck this 3.5 weeks ago. Bruising and pain overall has improved excluding when she walk - can feel pain along anterior shin when she steps forward. 0/10 pain at rest. Occasionally will notice some swelling around her R ankle, but this is usually only at the end of the day.     Fasting for blood work      Today's PHQ-2 Score:   PHQ-2 ( 1999 [employer]) 9/19/2018 8/28/2018   Q1: Little interest or pleasure in doing things 0 0   Q2: Feeling down, depressed or hopeless 0 0   PHQ-2 Score 0 0   Q1: Little interest or pleasure in doing things Not at all -   Q2: Feeling down, depressed or hopeless Not at all -   PHQ-2 Score 0 -       Abuse: Current or Past(Physical, Sexual or Emotional)- No  Do you feel safe in your environment - Yes    Social History   Substance Use Topics     Smoking status: Former Smoker     Packs/day: 0.50     Years: 2.00     Types: Cigarettes     Start date: 1999     Quit date: 2/2/2001     Smokeless tobacco: Never Used     Alcohol use Yes      Comment: 0-0.5 glasses of wine per month     If you drink alcohol do you typically have >3 drinks per day or >7 drinks per week? No                     Reviewed orders with patient.  Reviewed health maintenance and updated orders accordingly - Yes  BP Readings from Last 3 Encounters:   09/19/18 104/76   08/28/18 124/86   06/15/18 132/88    Wt Readings from Last 3 Encounters:   09/19/18  223 lb (101.2 kg)   08/28/18 225 lb (102.1 kg)   06/15/18 217 lb (98.4 kg)                  Patient Active Problem List   Diagnosis     Allergic rhinitis     Varicose Veins of Legs- painful with standing     Abnormal Mammogram- 2/2006 - prob benign      Heart palpitations - since 2010 intermittent. Reports multiple work-ups with unclear cause. Followed by cardiology.     CARDIOVASCULAR SCREENING; LDL GOAL LESS THAN 160     Anxiety     Migraine with aura and without status migrainosus, not intractable - since 2003; stable.     Mild persistent asthma without complication     Bilateral ankle pain     Family history of breast cancer - paternal grandma, paternal aunt and paternal great-grandmother. Dad completed genetic screening which was neg.      Class 2 obesity due to excess calories without serious comorbidity with body mass index (BMI) of 37.0 to 37.9 in adult     Past Surgical History:   Procedure Laterality Date     NO HISTORY OF SURGERY         Social History   Substance Use Topics     Smoking status: Former Smoker     Packs/day: 0.50     Years: 2.00     Types: Cigarettes     Start date: 1999     Quit date: 2/2/2001     Smokeless tobacco: Never Used     Alcohol use Yes      Comment: 0-0.5 glasses of wine per month     Family History   Problem Relation Age of Onset     Lipids Father      Neurologic Disorder Father      epilepsy     HEART DISEASE Father 47     MI     Hypertension Father      Hyperlipidemia Father      Lipids Sister      Hyperlipidemia Sister      Breast Cancer Paternal Grandmother      3rd generation      Breast Cancer Paternal Aunt      Diabetes Mother      gestational      Asthma Mother      Diabetes Paternal Grandfather      Other Cancer Maternal Grandfather      Skin     Hyperlipidemia Paternal Half-Sister      Breast Cancer Other      Other Cancer Maternal Grandmother      Lung     Ovarian Cancer Maternal Grandmother      Lung Cancer Maternal Grandmother      Diabetes Maternal Uncle       Diabetes Maternal Uncle      Colon Cancer No family hx of          Current Outpatient Prescriptions   Medication Sig Dispense Refill     loratadine (CLARITIN) 10 MG tablet Take 10 mg by mouth daily       albuterol (PROAIR HFA/PROVENTIL HFA/VENTOLIN HFA) 108 (90 BASE) MCG/ACT Inhaler Inhale 1-2 puffs into the lungs every 4 hours as needed for shortness of breath / dyspnea or wheezing 1 Inhaler 1     fluticasone (FLOVENT HFA) 44 MCG/ACT Inhaler Inhale 2 puffs into the lungs 2 times daily 1 Inhaler 1     Allergies   Allergen Reactions     Zithromax [Azithromycin Dihydrate] GI Disturbance       Mammogram not appropriate for this patient based on age.  Multiple relatives with breast cancer, but pt reports dad had genetic screening which was negative so no further screening was required.  EPIC review shows possible abnormal mammogram in 2006, but pt states she was breast feeding at the time and then L lump resolved so she never completed 6 month follow-up.       Pertinent mammograms are reviewed under the imaging tab.  History of abnormal Pap smear: NO - age 30-65 PAP every 5 years with negative HPV co-testing recommended  PAP / HPV Latest Ref Rng & Units 3/11/2016 7/21/2014 5/30/2013   PAP - NIL NIL NIL   HPV 16 DNA NEG Negative - -   HPV 18 DNA NEG Negative - -   OTHER HR HPV NEG Negative - -     Reviewed and updated as needed this visit by clinical staff  Tobacco  Allergies  Meds  Med Hx  Surg Hx  Fam Hx  Soc Hx        Reviewed and updated as needed this visit by Provider  Tobacco  Allergies  Meds  Med Hx  Surg Hx  Fam Hx  Soc Hx           ROS:  CONSTITUTIONAL: NEGATIVE for fever, chills, change in weight  INTEGUMENTARU/SKIN: + for skin lesion on scalp. Her hairdresser noted it. Has had 2 lesions removed in the past, but non cancerous.  Maternal grandfather with skin cancer, but not sure what type it was.  Tanning bed use maybe once or twice as a teenager, but not consistently.   OCD about applying sun  "screening.    NEGATIVE for worrisome rashes, moles or lesions  EYES: NEGATIVE for vision changes or irritation  ENT: NEGATIVE for ear, mouth and throat problems  RESP: + for mild chest cold for the past 1 week.  NEGATIVE for significant cough or SOB  No fevers, hasn't gotten worse.    BREAST: NEGATIVE for masses, tenderness or discharge  CV: NEGATIVE for chest pain, palpitations or peripheral edema  GI: NEGATIVE for nausea, abdominal pain, heartburn, or change in bowel habits  : NEGATIVE for unusual urinary or vaginal symptoms. Periods are regular.  MUSCULOSKELETAL: + for leg pain - see HPI. NEGATIVE for significant arthralgias or myalgia  NEURO: NEGATIVE for weakness, dizziness or paresthesias  PSYCHIATRIC: NEGATIVE for changes in mood or affect    OBJECTIVE:   /76 (BP Location: Right arm, Cuff Size: Adult Large)  Pulse 89  Temp 97.9  F (36.6  C) (Oral)  Ht 5' 5\" (1.651 m)  Wt 223 lb (101.2 kg)  SpO2 99%  BMI 37.11 kg/m2  EXAM:  GENERAL: healthy, alert and no distress  EYES: Eyes grossly normal to inspection, PERRL and conjunctivae and sclerae normal  HENT: ear canals and TM's normal, nose and mouth without ulcers or lesions  NECK: no adenopathy, no asymmetry, masses, or scars and thyroid normal to palpation  RESP: lungs clear to auscultation - no rales, rhonchi or wheezes  BREAST: deferred   CV: regular rate and rhythm, normal S1 S2, no S3 or S4, no murmur, click or rub, no peripheral edema and peripheral pulses strong  ABDOMEN: soft, nontender, no hepatosplenomegaly, no masses and bowel sounds normal  MS: pt continues to have tender area along mid R shin with underlying soft tissue thickening in this region c/w prior contusion. Reports it feels numb exactly in distribution here where softball struck her. Prior bruising has since resolved.    SKIN: light brown macule located along mid lateral R scalp just smaller then a pencil eraser.  no suspicious lesions or rashes  NEURO: Normal strength and tone, " mentation intact and speech normal  PSYCH: mentation appears normal, affect normal/bright    Diagnostic Test Results:  none     ASSESSMENT/PLAN:       ICD-10-CM    1. Encounter for routine adult health examination with abnormal findings Z00.01    2. Contusion of right lower extremity, subsequent encounter S80.11XD ORTHO  REFERRAL   3. Class 2 obesity due to excess calories without serious comorbidity with body mass index (BMI) of 37.0 to 37.9 in adult E66.09     Z68.37    4. Screening for diabetes mellitus Z13.1 Comprehensive metabolic panel   5. CARDIOVASCULAR SCREENING; LDL GOAL LESS THAN 160 Z13.6 Lipid panel reflex to direct LDL Fasting   6. Family history of breast cancer - paternal grandma, paternal aunt and paternal great-grandmother. Dad completed genetic screening which was neg.  Z80.3 MA Screen Bilateral w/Ata   7. Abnormal Mammogram- 2/2006 - prob benign; pt never completed 6 month recheck. R92.8 MA Screen Bilateral w/Ata   8. Encounter for screening mammogram for breast cancer Z12.31 MA Screen Bilateral w/Ata   9. Screening for thyroid disorder Z13.29 TSH with free T4 reflex   10. Skin lesion L98.9 SKIN CARE REFERRAL   11. Upper respiratory tract infection, unspecified type J06.9    12. Need for influenza vaccination - will return when feeling better. Z23    13. Migraine with aura and without status migrainosus, not intractable - since 2003; stable. G43.109    Reviewed with pt that still suspect R lower extremity injury is consistent with contusion, but will refer to ortho for further evaluation and definitive management given her concern.  Will also refer to [facility] skin clinic for full body skin exam as pt has nevus on scalp she would like checked and has Fhx of skin cancer. Encouraged ongoing sun protection.  Complete fasting labs and will follow-up with results when available.  Updated problem list today as well since she had multiple old codes there were no longer current issues.  Migraines  "stable - last was 6 months ago. Encouraged follow-up with recurrence or increasing frequently.   Pt also has an extensive FHx of breast cancer with report of borderline mammogram in past. Reassuring prior breast symptom resolved, but she never completed the 6 month follow-up suggested so will order for screening today.  Also mentioned some URI Sx for 1 week, but has reassuring breath sounds. Thus, likely viral and would expect her to improve over this next week. Can return for flu vaccine when feeling better.    A total of 40 minutes was spent with the patient today, with greater than 50% (20 min) of the visit involving counseling and coordination of care regarding additional concerns as noted above.      COUNSELING:   Reviewed preventive health counseling, as reflected in patient instructions       Regular exercise       Healthy diet/nutrition       Immunizations    Declined: Influenza due to Other see above            BP Readings from Last 1 Encounters:   09/19/18 104/76     Estimated body mass index is 37.11 kg/(m^2) as calculated from the following:    Height as of this encounter: 5' 5\" (1.651 m).    Weight as of this encounter: 223 lb (101.2 kg).      Weight management plan: Discussed healthy diet and exercise guidelines and patient will follow up in 12 months in clinic to re-evaluate.     reports that she quit smoking about 17 years ago. Her smoking use included Cigarettes. She started smoking about 19 years ago. She has a 1.00 pack-year smoking history. She has never used smokeless tobacco.      Counseling Resources:  ATP IV Guidelines  Pooled Cohorts Equation Calculator  Breast Cancer Risk Calculator  FRAX Risk Assessment  ICSI Preventive Guidelines  Dietary Guidelines for Americans, 2010  USDA's MyPlate  ASA Prophylaxis  Lung CA Screening    Anna Persaud PA-C  [facility]  "

## 2018-09-22 LAB — TSH SERPL DL<=0.005 MIU/L-ACNC: 1.08 MU/L (ref 0.4–4)

## 2018-09-25 NOTE — PROGRESS NOTES
Please call or write patient with the following results:    -Liver and gallbladder tests (ALT,AST, Alk phos,bilirubin) are normal.  -Kidney function (GFR) is normal.  -Sodium is normal.  -Potassium is normal.  -Glucose (diabetic screening test) is normal.  -Calcium is slightly low. Please ensure adequate dietary intake of calcium with 3 servings daily.  -LDL(bad) cholesterol and trigylceride levels are normal.  -HDL(good) cholesterol level is low which can increase your heart disease risk.  A diet high in fat and simple carbohydrates, genetics and being overweight can contribute to this.   ADVISE: a regular exercise program with at least 30 minutes of aerobic exercise 3-4 days/week ( 45 minutes 4-6 days/week if weight loss needed), and omega-3 fatty acids (fish oil) 7383-8126 mg daily are helpful to improve this.  Rechecking your cholesterol in 12 months is recommended (LIPID w/ LDL reflex, DX: low HDL).  -TSH (thyroid stimulating hormone) level is normal which indicates normal thyroid function.    Electronically Signed By: Anna Persaud PA-C

## 2018-09-28 ENCOUNTER — OFFICE VISIT (OUTPATIENT)
Dept: ORTHOPEDICS | Facility: CLINIC | Age: 33
End: 2018-09-28
Payer: COMMERCIAL

## 2018-09-28 VITALS
SYSTOLIC BLOOD PRESSURE: 120 MMHG | DIASTOLIC BLOOD PRESSURE: 78 MMHG | WEIGHT: 223 LBS | HEIGHT: 65 IN | BODY MASS INDEX: 37.15 KG/M2

## 2018-09-28 DIAGNOSIS — M79.604 PAIN OF RIGHT LOWER EXTREMITY: Primary | ICD-10-CM

## 2018-09-28 DIAGNOSIS — S80.11XA CONTUSION OF RIGHT TIBIA: ICD-10-CM

## 2018-09-28 PROCEDURE — 99243 OFF/OP CNSLTJ NEW/EST LOW 30: CPT | Performed by: FAMILY MEDICINE

## 2018-09-28 NOTE — LETTER
"    9/28/2018         RE: Sayda Valles  [address] [place] [facility] [facility] [address]        Dear Colleague,    Thank you for referring your patient, Sayda Valles, to the [facility]. Please see a copy of my visit note below.    ASSESSMENT & PLAN    1. Pain of right lower extremity    2. Contusion of right tibia      No restrictions  Numbness can take time to resolve  No evidence of myositis ossificans on ultrasound    Follow up as needed.      -----    SUBJECTIVE  aSyda Valles is a/an 32 year old female who is seen in consultation at the request of  Anna Persaud PA-C for evaluation of right lower leg injury. The patient is seen by themselves.    Onset: 8/25/18. Patient describes injury as she was hit by a line drive while playing softball.  Was seen in urgent care on 8/25 and had x-rays that were deemed negative.  Location of Pain: right lower leg  Rating of Pain at worst: 3/10  Rating of Pain Currently: 0/10  Worsened by: direct pressure   Better with: avoiding pressure to the area  Treatments tried: rest/activity avoidance, elevation, ice and ibuprofen  Associated symptoms: gets swelling down into her right ankle at the end of the day, numbness over the spot where the ball hit her  Orthopedic history: right ankle injury 1 year ago- has done physical therapy for bilateral ankle weakness, varicose vein in right leg  Relevant surgical history: NO  Patient Social History: works as a     Patient's past medical, surgical, social, and family histories were reviewed today and no changes are noted.    REVIEW OF SYSTEMS:  10 point ROS is negative other than symptoms noted above in HPI, Past Medical History or as stated below  Constitutional: NEGATIVE for fever, chills, change in weight  Skin: NEGATIVE for worrisome rashes, moles or lesions  GI/: NEGATIVE for bowel or bladder changes  Neuro: NEGATIVE for weakness, dizziness or paresthesias    OBJECTIVE:  /78  Ht 5' 5\" (1.651 " m)  Wt 223 lb (101.2 kg)  BMI 37.11 kg/m2   General: healthy, alert and in no distress  HEENT: no scleral icterus or conjunctival erythema  Skin: no suspicious lesions or rash. No jaundice.  CV: no pedal edema  Resp: normal respiratory effort without conversational dyspnea   Psych: normal mood and affect  Gait: normal steady gait with appropriate coordination and balance  Neuro: Normal light sensory exam of lower extremity  MSK:  RIGHT TIBIA  Inspection:    normal alignment  Palpation:    Mildly tender about the tibial shaft, medial portion with slight overlying swelling.  Range of Motion:     Full nonpainful range of the ankle.  Strength:  No pain with resisted ankle dorsi and plantar flexion    Independent visualization of the below image:  None indicated at this time    Patient's conditions were thoroughly discussed during today's visit with greater than 50% of the visit spent counseling the patient with total time spent face-to-face with the patient being 15 minutes.    Rah Piña DO [facility]      Again, thank you for allowing me to participate in the care of your patient.        Sincerely,        Rah Piña DO

## 2018-09-28 NOTE — PATIENT INSTRUCTIONS
1. Pain of right lower extremity    2. Contusion of right tibia      No restrictions  Numbness can take time to resolve  Swelling is soft tissue and not related to any bony overgrowth    Follow up as needed.      [facility]  Join me downstairs in the lobby of the [facility] the 1st Saturday of every month at 1pm for a free health talk. Come, listen and walk at your own pace!

## 2018-09-28 NOTE — PROGRESS NOTES
"ASSESSMENT & PLAN    1. Pain of right lower extremity    2. Contusion of right tibia      No restrictions  Numbness can take time to resolve  No evidence of myositis ossificans on ultrasound    Follow up as needed.      -----    SUBJECTIVE  Sayda Valles is a/an 32 year old female who is seen in consultation at the request of  Anna Persaud PA-C for evaluation of right lower leg injury. The patient is seen by themselves.    Onset: 8/25/18. Patient describes injury as she was hit by a line drive while playing softball.  Was seen in urgent care on 8/25 and had x-rays that were deemed negative.  Location of Pain: right lower leg  Rating of Pain at worst: 3/10  Rating of Pain Currently: 0/10  Worsened by: direct pressure   Better with: avoiding pressure to the area  Treatments tried: rest/activity avoidance, elevation, ice and ibuprofen  Associated symptoms: gets swelling down into her right ankle at the end of the day, numbness over the spot where the ball hit her  Orthopedic history: right ankle injury 1 year ago- has done physical therapy for bilateral ankle weakness, varicose vein in right leg  Relevant surgical history: NO  Patient Social History: works as a     Patient's past medical, surgical, social, and family histories were reviewed today and no changes are noted.    REVIEW OF SYSTEMS:  10 point ROS is negative other than symptoms noted above in HPI, Past Medical History or as stated below  Constitutional: NEGATIVE for fever, chills, change in weight  Skin: NEGATIVE for worrisome rashes, moles or lesions  GI/: NEGATIVE for bowel or bladder changes  Neuro: NEGATIVE for weakness, dizziness or paresthesias    OBJECTIVE:  /78  Ht 5' 5\" (1.651 m)  Wt 223 lb (101.2 kg)  BMI 37.11 kg/m2   General: healthy, alert and in no distress  HEENT: no scleral icterus or conjunctival erythema  Skin: no suspicious lesions or rash. No jaundice.  CV: no pedal edema  Resp: normal respiratory " effort without conversational dyspnea   Psych: normal mood and affect  Gait: normal steady gait with appropriate coordination and balance  Neuro: Normal light sensory exam of lower extremity  MSK:  RIGHT TIBIA  Inspection:    normal alignment  Palpation:    Mildly tender about the tibial shaft, medial portion with slight overlying swelling.  Range of Motion:     Full nonpainful range of the ankle.  Strength:  No pain with resisted ankle dorsi and plantar flexion    Independent visualization of the below image:  None indicated at this time    Patient's conditions were thoroughly discussed during today's visit with greater than 50% of the visit spent counseling the patient with total time spent face-to-face with the patient being 15 minutes.    Rah Piña DO [facility]

## 2018-10-08 ENCOUNTER — HOSPITAL ENCOUNTER (OUTPATIENT)
Dept: MAMMOGRAPHY | Facility: CLINIC | Age: 33
Discharge: HOME | End: 2018-10-08
Attending: PHYSICIAN ASSISTANT | Admitting: PHYSICIAN ASSISTANT
Payer: COMMERCIAL

## 2018-10-08 DIAGNOSIS — Z12.31 ENCOUNTER FOR SCREENING MAMMOGRAM FOR BREAST CANCER: ICD-10-CM

## 2018-10-08 DIAGNOSIS — R92.8 ABNORMAL MAMMOGRAM: ICD-10-CM

## 2018-10-08 DIAGNOSIS — Z80.3 FAMILY HISTORY OF BREAST CANCER: ICD-10-CM

## 2018-10-08 PROCEDURE — 77063 BREAST TOMOSYNTHESIS BI: CPT

## 2018-10-10 ENCOUNTER — HOSPITAL ENCOUNTER (OUTPATIENT)
Dept: ULTRASOUND IMAGING | Facility: CLINIC | Age: 33
Discharge: HOME | End: 2018-10-10
Attending: PHYSICIAN ASSISTANT | Admitting: PHYSICIAN ASSISTANT
Payer: COMMERCIAL

## 2018-10-10 DIAGNOSIS — R92.8 ABNORMAL MAMMOGRAM: ICD-10-CM

## 2018-10-10 PROCEDURE — 76642 ULTRASOUND BREAST LIMITED: CPT | Mod: RT

## 2018-10-14 NOTE — PROGRESS NOTES
Reviewed with pt by radiology, follow-up imaging scheduled for 10/10/18.  Electronically Signed By: Anna Persaud PA-C

## 2018-10-20 NOTE — PROGRESS NOTES
Please call or write patient with the following results:    Dear Sayda,    Your follow-up breast testing was normal. Additional ultrasound views confirmed that previously noted indeterminate mammogram finding was due to overlapping breast tissue and there was no abnormalities seen on follow-up. Please continue with routine annual mammograms for ongoing breast cancer screenings. Please let us know if you have any further questions.     Thank you,    Anna Persaud PA-C

## 2019-02-05 ENCOUNTER — THERAPY VISIT (OUTPATIENT)
Dept: CHIROPRACTIC MEDICINE | Facility: CLINIC | Age: 34
End: 2019-02-05
Payer: COMMERCIAL

## 2019-02-05 DIAGNOSIS — M99.01 SEGMENTAL DYSFUNCTION OF CERVICAL REGION: Primary | ICD-10-CM

## 2019-02-05 DIAGNOSIS — M54.9 MECHANICAL BACK PAIN: ICD-10-CM

## 2019-02-05 DIAGNOSIS — M99.02 SEGMENTAL DYSFUNCTION OF THORACIC REGION: ICD-10-CM

## 2019-02-05 DIAGNOSIS — M99.04 SEGMENTAL DYSFUNCTION OF SACRAL REGION: ICD-10-CM

## 2019-02-05 DIAGNOSIS — M99.03 SEGMENTAL DYSFUNCTION OF LUMBAR REGION: ICD-10-CM

## 2019-02-05 PROCEDURE — 98941 CHIROPRACT MANJ 3-4 REGIONS: CPT | Mod: AT | Performed by: CHIROPRACTOR

## 2019-02-05 PROCEDURE — 99203 OFFICE O/P NEW LOW 30 MIN: CPT | Mod: 25 | Performed by: CHIROPRACTOR

## 2019-02-05 NOTE — PROGRESS NOTES
Chiropractic Clinic Visit    PCP: Bertha Raviioana Valles is a 33 year old female who is seen  as a self referral presenting with middle to upper back pain that has worsened since she was adjusted last week. She has been seeing a chiropractor, but they sold their practice and things have changed, so she would like to establish care. She points to pain in her right shoulder blade, pain is worse with deep inspiration. She has used heat, Theracane, acupressure ball, and ibuprofen which seems to help. She is traveling for work and needs to feel better. Stretching also helps. The pain is rated about 8/10. She is sleeping well at night but it is hard to get comfortable.     Injury: None known, ongoing problem    Location of Pain: right scapular and rib pain   Duration of Pain: ongoing   Rating of Pain at worst: 8/10  Rating of Pain Currently: 7/10  Symptoms are better with: Heat and Ibuprofen  Symptoms are worse with: slipped and fell on ice, stress         Health History  as reported by the patient:    How does the patient rate their own health:   Good    Current or past medical history:   Asthma - rescue meds PRN Migraines/headaches - been better since childhood and Overweight    Medical allergies:  Zithromax    Past Traumas/Surgeries:  None    Family History:  Epilepsy, high CHO, cancer, heart attacks    Medications:  Anti-inflammatory    Occupation:   -     Primary job tasks:   Computer work, Driving and Prolonged standing    Barriers as home/work:   She continues on.          Review of Systems  Musculoskeletal: as above  Remainder of review of systems is negative including constitutional, CV, pulmonary, GI, Skin and Neurologic except as noted in HPI or medical history.    Past Medical History:   Diagnosis Date     Allergic rhinitis, cause unspecified     Allergic rhinitis     Cervicalgia 3/17/2011     Encounter for other general counseling or advice on contraception 9/12/2007      Diagnosis updated by automated process. Provider to review and confirm.     Family history of malignant neoplasm of breast     4 generations on her father's side     FAMILY HX BREAST MALIG      Heart palpitations 2/10    PVC's - dr sandhu     Migraine without aura, with intractable migraine, so stated, without mention of status migrainosus     sees neurologist     Mild persistent asthma      Non morbid obesity due to excess calories 5/9/2016     Simple goiter 11/25/2008    pt has no enlargement and had normal blood work-up at that time     Past Surgical History:   Procedure Laterality Date     NO HISTORY OF SURGERY         Objective  There were no vitals taken for this visit.    GENERAL APPEARANCE: healthy, alert and no distress   GAIT: NORMAL  SKIN: no suspicious lesions or rashes  NEURO: Normal strength and tone, mentation intact and speech normal  PSYCH:  mentation appears normal and affect normal/bright    Sayda was asked to complete the Neck Disability Index, today in the office. NDI Disability score: 10%; pain severity scale: 8/10.    Cervical Spine Exam    Range of Motion:         WNL, some pulling    Inspection:         No visible deformity        normal lordotic curvature maintained    Tender:        Right scapula, rib        Muscle strength:       C5 (shoulder abduction) symmetric 5/5 Normal       C6 (elbow flexion) symmetric 5/5 Normal       C7 (elbow extension) symmetric 5/5 Normal       C8 (finger abduction, thumb flexion) symmetric 5/5 Normal    Reflexes:        C5 (biceps) symmetric 2 bilaterally       C6 (supinator) symmetric 2 bilaterally       C7 (triceps) symmetric 2 bilaterally    Sensation:       grossly intact througout bilateral upper extremities    Special Tests:       neg (-) Spurling  Clarence's- negative, Distraction - negative and Shoulder depression - Right negative and Left negative    Lymphatics:        no edema noted in the upper extremities       Segmental spinal  dysfunction/restrictions found at:  C2 , C5 , T1 , T5 , T9 , L4  and PSIS Right         Muscle spasm found in:Rhomboids and Traps      Radiology:  None warranted at this time. Consider if no improvement with conservative management.     Assessment:    No diagnosis found.    RX ordered/plan of care: Mechanical neck and back pain, with associated myospasm and intersegmental dysfunction.   Anticipated outcomes: Patient is expected to get relief with care.   Possible risks and side effects: Minimal soreness expected post-adjustment.     After discussing the risk and benefits of care, patient consented to treatment.    Patient's condition:  Patient had restrictions pre-manipulation    Treatment effectiveness:  Post manipulation there is better intersegmental movement and Patient claims to feel looser post manipulation    Plan:    Procedures:    Evaluation and Management:  72301 Moderate level exam 30 min    CMT:  13121 Chiropractic manipulative treatment 3-4 regions performed   Cervical: Diversified, C2, C5 , Supine  Thoracic: Diversified, T1, T5, T9, Prone  Lumbar: Diversified, L4, Side posture  Pelvis: Diversified, PSIS Right , Side posture    Modalities:  76545: MSTM:  To Rhomboids and Traps  for 5 min    Therapeutic procedures:  Gave patient Ice instructions post adjustment, and instructions for acute care    Response to Treatment:  Patient tolerated treatment well today.     Prognosis: Good      Treatment plan and goals:  Goals:  Decrease pain in right scapula.  Manage exacerbations of pain.     Frequency of care  Duration of care is estimated to be 6 weeks, from the initial treatment.  It is estimated that the patient will need a total of 8 visits to resolve this episode.  For the initial therapeutic trial of care, the frequency is recommended at 1-2 times per week.  A reevaluation would be clinically appropriate in 8 visits, to determine progress and further course of care.    In-Office  Treatment  Evaluation  Spinal Chiropractic Manipulative Therapy:  Trial of care - re-evaluate after 8 visits.       Recommendations:    Instructions:  ice 20 minutes every other hour as needed    Follow-up:  Return to care in 1 week.     Disclaimer: This note consists of symbols derived from keyboarding, dictation and/or voice recognition software. As a result, there may be errors in the script that have gone undetected. Please consider this when interpreting information found in this chart.

## 2019-03-08 ENCOUNTER — OFFICE VISIT (OUTPATIENT)
Dept: FAMILY MEDICINE | Facility: CLINIC | Age: 34
End: 2019-03-08
Payer: COMMERCIAL

## 2019-03-08 ENCOUNTER — ANCILLARY PROCEDURE (OUTPATIENT)
Dept: GENERAL RADIOLOGY | Facility: CLINIC | Age: 34
End: 2019-03-08
Attending: FAMILY MEDICINE
Payer: COMMERCIAL

## 2019-03-08 VITALS
RESPIRATION RATE: 18 BRPM | HEART RATE: 83 BPM | DIASTOLIC BLOOD PRESSURE: 80 MMHG | WEIGHT: 230.6 LBS | OXYGEN SATURATION: 96 % | TEMPERATURE: 98.5 F | SYSTOLIC BLOOD PRESSURE: 128 MMHG | BODY MASS INDEX: 38.42 KG/M2 | HEIGHT: 65 IN

## 2019-03-08 DIAGNOSIS — J45.30 MILD PERSISTENT ASTHMA WITHOUT COMPLICATION: ICD-10-CM

## 2019-03-08 DIAGNOSIS — M54.6 RIGHT-SIDED THORACIC BACK PAIN, UNSPECIFIED CHRONICITY: Primary | ICD-10-CM

## 2019-03-08 DIAGNOSIS — M54.6 RIGHT-SIDED THORACIC BACK PAIN, UNSPECIFIED CHRONICITY: ICD-10-CM

## 2019-03-08 PROCEDURE — 99213 OFFICE O/P EST LOW 20 MIN: CPT | Performed by: FAMILY MEDICINE

## 2019-03-08 PROCEDURE — 71046 X-RAY EXAM CHEST 2 VIEWS: CPT

## 2019-03-08 RX ORDER — CYCLOBENZAPRINE HCL 10 MG
10 TABLET ORAL 3 TIMES DAILY PRN
Qty: 10 TABLET | Refills: 0 | Status: SHIPPED | OUTPATIENT
Start: 2019-03-08 | End: 2019-06-15

## 2019-03-08 ASSESSMENT — MIFFLIN-ST. JEOR: SCORE: 1746.87

## 2019-03-08 NOTE — PATIENT INSTRUCTIONS
Heat/Ice 20 minutes three times daily, only as discussed.    Ibuprofen 400 mg three times daily (with food) x 7 days, as needed for pain.    Muscle relaxer (Flexeril) for breakthrough pain, only as prescribed.    Do not drive within 8 hours of taking a muscle relaxer, as discussed.    Physical Therapy, as discussed.    Follow up if worsening symptoms or not gradually improving over the next 1-2 weeks.    Follow up immediately if emergent symptoms develop, such as:  severe/worsening back pain, progressive weakness, or shortness of breath.

## 2019-03-08 NOTE — PROGRESS NOTES
SUBJECTIVE:   Sayda Valles is a 33 year old female presenting with a chief complaint of   Chief Complaint   Patient presents with     Back Pain     She is an established patient of [facility].    Back Pain    Onset of symptoms was 5 week(s) ago.  Location: Right upper back, beneath the right shoulder blade  Radiation: To the mid thoracic region occasionally   Context:       Mechanism: Pain started 24 hours after falling on her right leg and possibly twisting her back.  Course of symptoms is same.    Severity moderate, with 7/10 pain noted at rest.   Aggravating Factors: Exercise and deep breaths aggravate her pain.  Patient has awakened with pain a few times.  Denies:  No chest pain, shortness of breath, or other exertional symptoms noted.  No ripping/tearing back pain reported.    Therapies tried to improve symptoms include: Heat, Ice, Ibuprofen, Tylenol, Theracane, Massage x 2, and 4-5 Chiropractic sessions.  Patient notes some improvement with Ibuprofen and Tylenol, which she dislikes taking.  Past history:  Similar back pain related to playing softball during the summer months.    Additional History:  Patient was seen by Chiropractics 2/5/19, diagnosed with segmental dysfunction of the cervical, thoracic, lumbar, and sacral region, as well as mechanical back pain. Patient states that her chiropractor was concerned that she might have injured a rib.  Patient is interested in x-ray study today.    Review of Systems   Patient denies risk for pregnancy (not sexually active), with LMP 2/19/19.  Patient had paresthesias involving her left hand last week, described as a tingling sensation.  No current paresthesias reported.  No radicular symptoms or weakness.  No right leg symptoms, post recent fall.  Asthma was transiently aggravated due to allergy symptoms while visiting [place] last week.  Asthma is currently well controlled, without cough, wheezing, or shortness of breath reported.  ACT score is 19 today.  No  hemoptysis.  History of heart palpitations, previously evaluated by Cardiology.    Patient Active Problem List   Diagnosis     Allergic rhinitis     Varicose Veins of Legs- painful with standing     Abnormal Mammogram- 2/2006 - prob benign      Heart palpitations - since 2010 intermittent. Reports multiple work-ups with unclear cause. Followed by cardiology.     CARDIOVASCULAR SCREENING; LDL GOAL LESS THAN 160     Anxiety     Migraine with aura and without status migrainosus, not intractable - since 2003; stable.     Mild persistent asthma without complication     Bilateral ankle pain     Family history of breast cancer - paternal grandma, paternal aunt and paternal great-grandmother. Dad completed genetic screening which was neg.      Class 2 obesity due to excess calories without serious comorbidity with body mass index (BMI) of 37.0 to 37.9 in adult     Segmental dysfunction of cervical region     Segmental dysfunction of thoracic region     Segmental dysfunction of lumbar region     Segmental dysfunction of sacral region     Mechanical back pain     Past Medical History:   Diagnosis Date     Allergic rhinitis, cause unspecified     Allergic rhinitis     Cervicalgia 3/17/2011     Encounter for other general counseling or advice on contraception 9/12/2007     Diagnosis updated by automated process. Provider to review and confirm.     Family history of malignant neoplasm of breast     4 generations on her father's side     FAMILY HX BREAST MALIG      Heart palpitations 2/10    PVC's - dr sandhu     Migraine without aura, with intractable migraine, so stated, without mention of status migrainosus     sees neurologist     Mild persistent asthma      Non morbid obesity due to excess calories 5/9/2016     Simple goiter 11/25/2008    pt has no enlargement and had normal blood work-up at that time     Family History   Problem Relation Age of Onset     Lipids Father      Neurologic Disorder Father         epilepsy     Heart  "Disease Father 47        MI     Hypertension Father      Hyperlipidemia Father      Lipids Sister      Hyperlipidemia Sister      Breast Cancer Paternal Grandmother         3rd generation      Breast Cancer Paternal Aunt      Diabetes Mother         gestational      Asthma Mother      Diabetes Paternal Grandfather      Other Cancer Maternal Grandfather         Skin     Hyperlipidemia Paternal Half-Sister      Breast Cancer Other      Other Cancer Maternal Grandmother         Lung     Ovarian Cancer Maternal Grandmother      Lung Cancer Maternal Grandmother      Diabetes Maternal Uncle      Diabetes Maternal Uncle      Colon Cancer No family hx of      Current Outpatient Medications   Medication Sig Dispense Refill     albuterol (PROAIR HFA/PROVENTIL HFA/VENTOLIN HFA) 108 (90 BASE) MCG/ACT Inhaler Inhale 1-2 puffs into the lungs every 4 hours as needed for shortness of breath / dyspnea or wheezing 1 Inhaler 1     fluticasone (FLOVENT HFA) 44 MCG/ACT Inhaler Inhale 2 puffs into the lungs 2 times daily 1 Inhaler 1     loratadine (CLARITIN) 10 MG tablet Take 10 mg by mouth daily       Social History     Tobacco Use     Smoking status: Former Smoker     Packs/day: 0.50     Years: 2.00     Pack years: 1.00     Types: Cigarettes     Start date:      Last attempt to quit: 2001     Years since quittin.1     Smokeless tobacco: Never Used   Substance Use Topics     Alcohol use: Yes     Comment: 0-0.5 glasses of wine per month       OBJECTIVE  /80 (BP Location: Right arm, Patient Position: Chair, Cuff Size: Adult Large)   Pulse 83   Temp 98.5  F (36.9  C) (Oral)   Resp 18   Ht 1.651 m (5' 5\")   Wt 104.6 kg (230 lb 9.6 oz)   LMP 2019   SpO2 96%   Breastfeeding? No   BMI 38.37 kg/m      Physical Exam    GENERAL APPEARANCE:  Awake, alert, and oriented x 3.  PSYCHIATRIC:  Affect within normal limits.  HEENT:  Sclera anicteric.  No conjunctivitis.  PERRLA.  Extraocular movements are intact.  No " erythema, edema, or exudates of the oral mucosa or posterior pharynx.  Mucous membranes moist.  NECK:  Full range of motion.  Not tender.  No nuchal rigidity.    BACK:  Full range of motion.  No midline tenderness noted.  There is mild tenderness to palpation noted just medial to the right scapula, which reproduces the patient's pain.  No evidence of muscle spasm.  There is mild tenderness to palpation noted involving the medial thoracic paraspinous muscles on the right.  SHOULDERS:  Full range of motion.  5/5 strength x 4 rotator cuff muscles.  HEART:  Normal S1, S2.  Regular rate and rhythm.  No murmurs, rubs, or gallops.  LUNGS:  No respiratory distress.  No wheezes, rales, or rhonchi.  ABDOMEN:   Not distended.    NEUROLOGIC:  Full range of motion and intact strength x 4 extremities.  Upper extremity reflexes are 2/5 and symmetric.  No ataxia.  SKIN:  No suspicious lesions or rashes.    Labs:  No results found for this or any previous visit (from the past 24 hour(s)).    Urine Pregnancy Test discussed with and declined by patient.    Chest X-ray:  Negative for acute changes, infiltrate, effusion, or pneumothorax.    ASSESSMENT:      ICD-10-CM    1. Right-sided thoracic back pain, unspecified chronicity, likely musculoskeletal etiology. M54.6 XR Chest 2 Views     cyclobenzaprine (FLEXERIL) 10 MG tablet     [facility]   2. Mild persistent asthma without complication, with ACT score 19 today. J45.30      PLAN:    Patient Instructions     Heat/Ice 20 minutes three times daily, only as discussed.    Ibuprofen 400 mg three times daily (with food) x 7 days, as needed for pain.    Muscle relaxer (Flexeril) for breakthrough pain, only as prescribed.    Do not drive within 8 hours of taking a muscle relaxer, as discussed.    Physical Therapy, as discussed.    Follow up if worsening symptoms or not gradually improving over the next 1-2 weeks.    Follow up immediately if emergent symptoms  develop, such as:  severe/worsening back pain, progressive weakness, or shortness of breath.    Discussed risks and benefits of treatment strategies, as noted in the Assessment and Plan sections.    The patient was discharged ambulatory and in stable condition post discussion of follow up.     Kari Hernandez MD  [facility]

## 2019-03-09 ASSESSMENT — ASTHMA QUESTIONNAIRES: ACT_TOTALSCORE: 19

## 2019-04-15 ENCOUNTER — OFFICE VISIT (OUTPATIENT)
Dept: FAMILY MEDICINE | Facility: CLINIC | Age: 34
End: 2019-04-15
Payer: COMMERCIAL

## 2019-04-15 VITALS
BODY MASS INDEX: 37.99 KG/M2 | SYSTOLIC BLOOD PRESSURE: 110 MMHG | DIASTOLIC BLOOD PRESSURE: 72 MMHG | TEMPERATURE: 98.1 F | HEART RATE: 86 BPM | OXYGEN SATURATION: 99 % | HEIGHT: 65 IN | WEIGHT: 228 LBS

## 2019-04-15 DIAGNOSIS — J02.9 SORE THROAT: Primary | ICD-10-CM

## 2019-04-15 LAB
DEPRECATED S PYO AG THROAT QL EIA: NORMAL
SPECIMEN SOURCE: NORMAL

## 2019-04-15 PROCEDURE — 87880 STREP A ASSAY W/OPTIC: CPT | Performed by: PHYSICIAN ASSISTANT

## 2019-04-15 PROCEDURE — 99213 OFFICE O/P EST LOW 20 MIN: CPT | Performed by: PHYSICIAN ASSISTANT

## 2019-04-15 PROCEDURE — 87081 CULTURE SCREEN ONLY: CPT | Performed by: PHYSICIAN ASSISTANT

## 2019-04-15 ASSESSMENT — MIFFLIN-ST. JEOR: SCORE: 1735.08

## 2019-04-15 NOTE — PROGRESS NOTES
SUBJECTIVE:   Sayda Valles is a 33 year old female who presents to clinic today for the following   health issues:        Acute Illness   Acute illness concerns: sore throat  Onset: started yesterday - worse today   Leaving to go to [place] today so just wants to be sure before she leaves that it's not strep. Daughter was just sick with a sore throat and congestion symptoms, but got better after a few days.  Hx of asthma, but this has been going really well. Repeat ACT today is 23.       Fever: no    Chills/Sweats: no    Headache (location?): no    Sinus Pressure:no    Conjunctivitis:  no    Ear Pain: no    Rhinorrhea: no    Congestion: no    Sore Throat: YES     Cough: no    Wheeze: no    Decreased Appetite: no    Nausea: no    Vomiting: no    Diarrhea:  no    Dysuria/Freq.: no    Fatigue/Achiness: no    Sick/Strep Exposure: no     Therapies Tried and outcome: ibuprofen or aleve      Additional history: as documented    Reviewed  and updated as needed this visit by clinical staff         Reviewed and updated as needed this visit by Provider         Patient Active Problem List   Diagnosis     Allergic rhinitis     Varicose Veins of Legs- painful with standing     Abnormal Mammogram- 2/2006 - prob benign      Heart palpitations - since 2010 intermittent. Reports multiple work-ups with unclear cause. Followed by cardiology.     CARDIOVASCULAR SCREENING; LDL GOAL LESS THAN 160     Anxiety     Migraine with aura and without status migrainosus, not intractable - since 2003; stable.     Mild persistent asthma without complication     Bilateral ankle pain     Family history of breast cancer - paternal grandma, paternal aunt and paternal great-grandmother. Dad completed genetic screening which was neg.      Class 2 obesity due to excess calories without serious comorbidity with body mass index (BMI) of 37.0 to 37.9 in adult     Segmental dysfunction of cervical region     Segmental dysfunction of thoracic region      Segmental dysfunction of lumbar region     Segmental dysfunction of sacral region     Mechanical back pain     Past Surgical History:   Procedure Laterality Date     NO HISTORY OF SURGERY         Social History     Tobacco Use     Smoking status: Former Smoker     Packs/day: 0.50     Years: 2.00     Pack years: 1.00     Types: Cigarettes     Start date:      Last attempt to quit: 2001     Years since quittin.2     Smokeless tobacco: Never Used   Substance Use Topics     Alcohol use: Yes     Comment: 0-0.5 glasses of wine per month     Family History   Problem Relation Age of Onset     Lipids Father      Neurologic Disorder Father         epilepsy     Heart Disease Father 47        MI     Hypertension Father      Hyperlipidemia Father      Lipids Sister      Hyperlipidemia Sister      Breast Cancer Paternal Grandmother         3rd generation      Breast Cancer Paternal Aunt      Diabetes Mother         gestational      Asthma Mother      Diabetes Paternal Grandfather      Other Cancer Maternal Grandfather         Skin     Hyperlipidemia Paternal Half-Sister      Breast Cancer Other      Other Cancer Maternal Grandmother         Lung     Ovarian Cancer Maternal Grandmother      Lung Cancer Maternal Grandmother      Diabetes Maternal Uncle      Diabetes Maternal Uncle      Colon Cancer No family hx of          Current Outpatient Medications   Medication Sig Dispense Refill     albuterol (PROAIR HFA/PROVENTIL HFA/VENTOLIN HFA) 108 (90 BASE) MCG/ACT Inhaler Inhale 1-2 puffs into the lungs every 4 hours as needed for shortness of breath / dyspnea or wheezing 1 Inhaler 1     cyclobenzaprine (FLEXERIL) 10 MG tablet Take 1 tablet (10 mg) by mouth 3 times daily as needed for muscle spasms Do not drive within 8 hours of taking this medication. 10 tablet 0     fluticasone (FLOVENT HFA) 44 MCG/ACT Inhaler Inhale 2 puffs into the lungs 2 times daily (Patient not taking: Reported on 3/8/2019) 1 Inhaler 1      "loratadine (CLARITIN) 10 MG tablet Take 10 mg by mouth daily       Allergies   Allergen Reactions     Zithromax [Azithromycin Dihydrate] GI Disturbance       ROS:  Constitutional, HEENT, cardiovascular, pulmonary, gi and gu systems are negative, except as otherwise noted.    OBJECTIVE:     /72 (BP Location: Right arm, Cuff Size: Adult Large)   Pulse 86   Temp 98.1  F (36.7  C) (Oral)   Ht 1.651 m (5' 5\")   Wt 103.4 kg (228 lb)   SpO2 99%   BMI 37.94 kg/m    Body mass index is 37.94 kg/m .  GENERAL: healthy, alert and no distress  EYES: Eyes grossly normal to inspection, PERRL and conjunctivae and sclerae normal  HENT: ear canals and TM's normal, nose. Pharynx without significant erythema or any exudates. There is perhaps a very slight early canker sore along R soft palate versus mucosal irritation here.  NECK: no adenopathy and no asymmetry, masses, or scars  RESP: lungs clear to auscultation - no rales, rhonchi or wheezes  CV: regular rates and rhythm and no murmur, click or rub    Diagnostic Test Results:  Strep screen - Negative    ASSESSMENT/PLAN:       ICD-10-CM    1. Sore throat J02.9 Rapid strep screen     Beta strep group A culture   See Patient Instructions  Patient in agreement with plan.     Patient Instructions   Neg strep.  Will call and notify you should your strep culture return positive and treat accordingly at that time.  Unclear if tiny early canker sore versus reviewed potential for other viral illnesses like hand, foot and mouth.  Watchful waiting and supportive cares.   Recheck anytime with concerns.     Anna Persaud PA-C  [facility]        "

## 2019-04-15 NOTE — PATIENT INSTRUCTIONS
Neg strep.  Will call and notify you should your strep culture return positive and treat accordingly at that time.  Unclear if tiny early canker sore versus reviewed potential for other viral illnesses like hand, foot and mouth.  Watchful waiting and supportive cares.   Recheck anytime with concerns.

## 2019-04-16 LAB
BACTERIA SPEC CULT: NORMAL
SPECIMEN SOURCE: NORMAL

## 2019-04-16 ASSESSMENT — ASTHMA QUESTIONNAIRES: ACT_TOTALSCORE: 23

## 2019-04-16 NOTE — RESULT ENCOUNTER NOTE
Result(s) was/were reviewed in the clinic with patient at time of appointment.  Electronically Signed By: Anna Persaud PA-C

## 2019-04-18 NOTE — RESULT ENCOUNTER NOTE
Dear Malina,      Your recent test results are noted below:    -Strep culture was negative. No antibiotics are required. I hope you're feeling better.    For additional lab test information, [email or web address] is an excellent reference. Please contact the clinic at [phone] with any further questions or concerns.    Sincerely,      Anna Persaud PA-C  [facility]

## 2019-06-15 ENCOUNTER — APPOINTMENT (OUTPATIENT)
Dept: MRI IMAGING | Facility: CLINIC | Age: 34
DRG: 064 | End: 2019-06-15
Attending: EMERGENCY MEDICINE
Payer: COMMERCIAL

## 2019-06-15 ENCOUNTER — HOSPITAL ENCOUNTER (INPATIENT)
Facility: CLINIC | Age: 34
LOS: 1 days | Discharge: HOME | DRG: 064 | End: 2019-06-16
Attending: EMERGENCY MEDICINE | Admitting: INTERNAL MEDICINE
Payer: COMMERCIAL

## 2019-06-15 ENCOUNTER — APPOINTMENT (OUTPATIENT)
Dept: CT IMAGING | Facility: CLINIC | Age: 34
DRG: 064 | End: 2019-06-15
Attending: EMERGENCY MEDICINE
Payer: COMMERCIAL

## 2019-06-15 DIAGNOSIS — I67.1 ANEURYSM OF OPHTHALMIC ARTERY: ICD-10-CM

## 2019-06-15 DIAGNOSIS — H53.8 BLURRED VISION: ICD-10-CM

## 2019-06-15 DIAGNOSIS — I63.9 CEREBELLAR INFARCT (H): ICD-10-CM

## 2019-06-15 DIAGNOSIS — I77.74 DISSECTING HEMORRHAGE OF LEFT VERTEBRAL ARTERY (H): ICD-10-CM

## 2019-06-15 DIAGNOSIS — I63.89 PARIETAL LOBE INFARCTION (H): ICD-10-CM

## 2019-06-15 DIAGNOSIS — I63.9 CEREBROVASCULAR ACCIDENT (CVA), UNSPECIFIED MECHANISM (H): ICD-10-CM

## 2019-06-15 LAB
ALBUMIN UR-MCNC: NEGATIVE MG/DL
ANION GAP SERPL CALCULATED.3IONS-SCNC: 5 MMOL/L (ref 3–14)
APPEARANCE UR: CLEAR
BACTERIA #/AREA URNS HPF: ABNORMAL /HPF
BASOPHILS # BLD AUTO: 0.1 10E9/L (ref 0–0.2)
BASOPHILS NFR BLD AUTO: 0.6 %
BILIRUB UR QL STRIP: NEGATIVE
BUN SERPL-MCNC: 8 MG/DL (ref 7–30)
CALCIUM SERPL-MCNC: 8.3 MG/DL (ref 8.5–10.1)
CHLORIDE SERPL-SCNC: 109 MMOL/L (ref 94–109)
CO2 SERPL-SCNC: 25 MMOL/L (ref 20–32)
COLOR UR AUTO: ABNORMAL
CREAT SERPL-MCNC: 0.72 MG/DL (ref 0.52–1.04)
DIFFERENTIAL METHOD BLD: ABNORMAL
EOSINOPHIL # BLD AUTO: 0 10E9/L (ref 0–0.7)
EOSINOPHIL NFR BLD AUTO: 0.1 %
ERYTHROCYTE [DISTWIDTH] IN BLOOD BY AUTOMATED COUNT: 13.7 % (ref 10–15)
GFR SERPL CREATININE-BSD FRML MDRD: >90 ML/MIN/{1.73_M2}
GLUCOSE SERPL-MCNC: 117 MG/DL (ref 70–99)
GLUCOSE UR STRIP-MCNC: NEGATIVE MG/DL
HCG SERPL QL: NEGATIVE
HCT VFR BLD AUTO: 40.1 % (ref 35–47)
HGB BLD-MCNC: 12.8 G/DL (ref 11.7–15.7)
HGB UR QL STRIP: NEGATIVE
IMM GRANULOCYTES # BLD: 0.1 10E9/L (ref 0–0.4)
IMM GRANULOCYTES NFR BLD: 0.8 %
KETONES UR STRIP-MCNC: NEGATIVE MG/DL
LEUKOCYTE ESTERASE UR QL STRIP: NEGATIVE
LYMPHOCYTES # BLD AUTO: 1.6 10E9/L (ref 0.8–5.3)
LYMPHOCYTES NFR BLD AUTO: 20.4 %
MCH RBC QN AUTO: 25.4 PG (ref 26.5–33)
MCHC RBC AUTO-ENTMCNC: 31.9 G/DL (ref 31.5–36.5)
MCV RBC AUTO: 80 FL (ref 78–100)
MONOCYTES # BLD AUTO: 0.5 10E9/L (ref 0–1.3)
MONOCYTES NFR BLD AUTO: 6.5 %
NEUTROPHILS # BLD AUTO: 5.7 10E9/L (ref 1.6–8.3)
NEUTROPHILS NFR BLD AUTO: 71.6 %
NITRATE UR QL: NEGATIVE
NRBC # BLD AUTO: 0 10*3/UL
NRBC BLD AUTO-RTO: 0 /100
PH UR STRIP: 6 PH (ref 5–7)
PLATELET # BLD AUTO: 361 10E9/L (ref 150–450)
POTASSIUM SERPL-SCNC: 3.6 MMOL/L (ref 3.4–5.3)
RBC # BLD AUTO: 5.03 10E12/L (ref 3.8–5.2)
RBC #/AREA URNS AUTO: 0 /HPF (ref 0–2)
SODIUM SERPL-SCNC: 139 MMOL/L (ref 133–144)
SOURCE: ABNORMAL
SP GR UR STRIP: 1 (ref 1–1.03)
TROPONIN I SERPL-MCNC: <0.015 UG/L (ref 0–0.04)
UROBILINOGEN UR STRIP-MCNC: NORMAL MG/DL (ref 0–2)
WBC # BLD AUTO: 7.9 10E9/L (ref 4–11)
WBC #/AREA URNS AUTO: <1 /HPF (ref 0–5)

## 2019-06-15 PROCEDURE — 99285 EMERGENCY DEPT VISIT HI MDM: CPT | Mod: 25

## 2019-06-15 PROCEDURE — 25500064 ZZH RX 255 OP 636: Performed by: EMERGENCY MEDICINE

## 2019-06-15 PROCEDURE — 80048 BASIC METABOLIC PNL TOTAL CA: CPT | Performed by: EMERGENCY MEDICINE

## 2019-06-15 PROCEDURE — 85025 COMPLETE CBC W/AUTO DIFF WBC: CPT | Performed by: EMERGENCY MEDICINE

## 2019-06-15 PROCEDURE — 81001 URINALYSIS AUTO W/SCOPE: CPT | Performed by: EMERGENCY MEDICINE

## 2019-06-15 PROCEDURE — 25000132 ZZH RX MED GY IP 250 OP 250 PS 637: Performed by: EMERGENCY MEDICINE

## 2019-06-15 PROCEDURE — 99222 1ST HOSP IP/OBS MODERATE 55: CPT | Mod: AI | Performed by: INTERNAL MEDICINE

## 2019-06-15 PROCEDURE — 70553 MRI BRAIN STEM W/O & W/DYE: CPT

## 2019-06-15 PROCEDURE — A9585 GADOBUTROL INJECTION: HCPCS | Performed by: EMERGENCY MEDICINE

## 2019-06-15 PROCEDURE — 12000000 ZZH R&B MED SURG/OB

## 2019-06-15 PROCEDURE — 84703 CHORIONIC GONADOTROPIN ASSAY: CPT | Performed by: EMERGENCY MEDICINE

## 2019-06-15 PROCEDURE — 70498 CT ANGIOGRAPHY NECK: CPT

## 2019-06-15 PROCEDURE — 96361 HYDRATE IV INFUSION ADD-ON: CPT

## 2019-06-15 PROCEDURE — 84484 ASSAY OF TROPONIN QUANT: CPT | Performed by: EMERGENCY MEDICINE

## 2019-06-15 PROCEDURE — 96374 THER/PROPH/DIAG INJ IV PUSH: CPT | Mod: 59

## 2019-06-15 PROCEDURE — 25000128 H RX IP 250 OP 636: Performed by: EMERGENCY MEDICINE

## 2019-06-15 PROCEDURE — 70544 MR ANGIOGRAPHY HEAD W/O DYE: CPT

## 2019-06-15 RX ORDER — HYDROMORPHONE HYDROCHLORIDE 1 MG/ML
0.2 INJECTION, SOLUTION INTRAMUSCULAR; INTRAVENOUS; SUBCUTANEOUS
Status: DISCONTINUED | OUTPATIENT
Start: 2019-06-15 | End: 2019-06-16 | Stop reason: HOSPADM

## 2019-06-15 RX ORDER — AMOXICILLIN 250 MG
1 CAPSULE ORAL 2 TIMES DAILY PRN
Status: DISCONTINUED | OUTPATIENT
Start: 2019-06-15 | End: 2019-06-16 | Stop reason: HOSPADM

## 2019-06-15 RX ORDER — POLYETHYLENE GLYCOL 3350 17 G/17G
17 POWDER, FOR SOLUTION ORAL DAILY PRN
Status: DISCONTINUED | OUTPATIENT
Start: 2019-06-15 | End: 2019-06-16 | Stop reason: HOSPADM

## 2019-06-15 RX ORDER — LIDOCAINE 40 MG/G
CREAM TOPICAL
Status: DISCONTINUED | OUTPATIENT
Start: 2019-06-15 | End: 2019-06-16 | Stop reason: HOSPADM

## 2019-06-15 RX ORDER — IBUPROFEN 600 MG/1
600 TABLET, FILM COATED ORAL 2 TIMES DAILY PRN
Status: ON HOLD | COMMUNITY
End: 2019-06-16

## 2019-06-15 RX ORDER — AMOXICILLIN 250 MG
2 CAPSULE ORAL 2 TIMES DAILY PRN
Status: DISCONTINUED | OUTPATIENT
Start: 2019-06-15 | End: 2019-06-16 | Stop reason: HOSPADM

## 2019-06-15 RX ORDER — OXYCODONE HYDROCHLORIDE 5 MG/1
5-10 TABLET ORAL EVERY 4 HOURS PRN
Status: DISCONTINUED | OUTPATIENT
Start: 2019-06-15 | End: 2019-06-16 | Stop reason: HOSPADM

## 2019-06-15 RX ORDER — PROCHLORPERAZINE 25 MG
25 SUPPOSITORY, RECTAL RECTAL EVERY 12 HOURS PRN
Status: DISCONTINUED | OUTPATIENT
Start: 2019-06-15 | End: 2019-06-16 | Stop reason: HOSPADM

## 2019-06-15 RX ORDER — IOPAMIDOL 755 MG/ML
500 INJECTION, SOLUTION INTRAVASCULAR ONCE
Status: COMPLETED | OUTPATIENT
Start: 2019-06-15 | End: 2019-06-15

## 2019-06-15 RX ORDER — PROCHLORPERAZINE MALEATE 5 MG
10 TABLET ORAL EVERY 6 HOURS PRN
Status: DISCONTINUED | OUTPATIENT
Start: 2019-06-15 | End: 2019-06-16 | Stop reason: HOSPADM

## 2019-06-15 RX ORDER — ACETAMINOPHEN 500 MG
1000 TABLET ORAL 2 TIMES DAILY PRN
COMMUNITY
End: 2023-11-13

## 2019-06-15 RX ORDER — SODIUM CHLORIDE 9 MG/ML
INJECTION, SOLUTION INTRAVENOUS CONTINUOUS
Status: DISCONTINUED | OUTPATIENT
Start: 2019-06-15 | End: 2019-06-16

## 2019-06-15 RX ORDER — NALOXONE HYDROCHLORIDE 0.4 MG/ML
.1-.4 INJECTION, SOLUTION INTRAMUSCULAR; INTRAVENOUS; SUBCUTANEOUS
Status: DISCONTINUED | OUTPATIENT
Start: 2019-06-15 | End: 2019-06-16 | Stop reason: HOSPADM

## 2019-06-15 RX ORDER — ATORVASTATIN CALCIUM 10 MG/1
10 TABLET, FILM COATED ORAL EVERY EVENING
Status: DISCONTINUED | OUTPATIENT
Start: 2019-06-15 | End: 2019-06-16

## 2019-06-15 RX ORDER — ACETAMINOPHEN 650 MG/1
650 SUPPOSITORY RECTAL EVERY 4 HOURS PRN
Status: DISCONTINUED | OUTPATIENT
Start: 2019-06-15 | End: 2019-06-16 | Stop reason: HOSPADM

## 2019-06-15 RX ORDER — NITROGLYCERIN 0.4 MG/1
0.4 TABLET SUBLINGUAL EVERY 5 MIN PRN
Status: DISCONTINUED | OUTPATIENT
Start: 2019-06-15 | End: 2019-06-16 | Stop reason: HOSPADM

## 2019-06-15 RX ORDER — ACETAMINOPHEN 325 MG/1
650 TABLET ORAL EVERY 4 HOURS PRN
Status: DISCONTINUED | OUTPATIENT
Start: 2019-06-15 | End: 2019-06-16 | Stop reason: HOSPADM

## 2019-06-15 RX ORDER — ALBUTEROL SULFATE 0.83 MG/ML
2.5 SOLUTION RESPIRATORY (INHALATION)
Status: DISCONTINUED | OUTPATIENT
Start: 2019-06-15 | End: 2019-06-16 | Stop reason: HOSPADM

## 2019-06-15 RX ORDER — ASPIRIN 325 MG
325 TABLET ORAL ONCE
Status: COMPLETED | OUTPATIENT
Start: 2019-06-15 | End: 2019-06-15

## 2019-06-15 RX ORDER — GADOBUTROL 604.72 MG/ML
10 INJECTION INTRAVENOUS ONCE
Status: COMPLETED | OUTPATIENT
Start: 2019-06-15 | End: 2019-06-15

## 2019-06-15 RX ORDER — BISACODYL 10 MG
10 SUPPOSITORY, RECTAL RECTAL DAILY PRN
Status: DISCONTINUED | OUTPATIENT
Start: 2019-06-15 | End: 2019-06-16 | Stop reason: HOSPADM

## 2019-06-15 RX ADMIN — ASPIRIN 325 MG ORAL TABLET 325 MG: 325 PILL ORAL at 19:54

## 2019-06-15 RX ADMIN — GADOBUTROL 10 ML: 604.72 INJECTION INTRAVENOUS at 18:14

## 2019-06-15 RX ADMIN — SODIUM CHLORIDE 1000 ML: 9 INJECTION, SOLUTION INTRAVENOUS at 17:26

## 2019-06-15 RX ADMIN — IOPAMIDOL 70 ML: 755 INJECTION, SOLUTION INTRAVENOUS at 20:12

## 2019-06-15 RX ADMIN — PROCHLORPERAZINE EDISYLATE 10 MG: 5 INJECTION INTRAMUSCULAR; INTRAVENOUS at 17:26

## 2019-06-15 ASSESSMENT — MIFFLIN-ST. JEOR: SCORE: 1705.88

## 2019-06-15 ASSESSMENT — ENCOUNTER SYMPTOMS
HEADACHES: 1
FREQUENCY: 1

## 2019-06-15 NOTE — ED TRIAGE NOTES
"Pt gets occular migraines, developed one at about 1250 today.  Pt took 600mg at Ibuprofen at 0915, also took 1500mg tylenol at 1500.  Pt states her headache isn't bad right now, but her vision is still \"holographic\" and has not returned back to normal.  Pt has a neurologist and is scheduled for MRI next week.  Pt also reports occasional dizziness.    "

## 2019-06-15 NOTE — ED PROVIDER NOTES
"  History     Chief Complaint:  Headache    HPI   Sayda Valles is a 33 year old female with a history of occular migraines who presents to the emergency department today for evaluation of a headache. The patient has been getting changing migraines, and has an MRI scheduled for 3 days from now. She last had an MRI in 2017, shown below.    Here, she states that this morning she woke up and took 3 ibuprofen at 0930 because she was feeling some tension that she assumed would turn into a migraine. It wasn't until 1255 that she started feeling hot and off balance. She sat down and started having an occular migraine. This affected both of her eyes, when normally it is just her right. She states she was having \"silver flashers\". She also had one 2 weeks ago with hand tingling which is abnormal.     She has no headache now but continues to have the visual disturbances. She also is very thirsty and has been frequently urinating  and has concern for possible diabetes as she has a family history. No history of stroke    MRI BRAIN WITHOUT AND WITH CONTRAST  12/8/2017  Normal MRI of the brain.    MR ANGIOGRAM OF THE HEAD WITHOUT CONTRAST   12/8/2017  Normal MR angiogram of the head.    Allergies:  Zithromax [Azithromycin Dihydrate]    Medications:    albuterol (PROAIR HFA/PROVENTIL HFA/VENTOLIN HFA) 108 (90 BASE) MCG/ACT Inhaler  cyclobenzaprine (FLEXERIL) 10 MG tablet  fluticasone (FLOVENT HFA) 44 MCG/ACT Inhaler  loratadine (CLARITIN) 10 MG tablet     Past Medical History:    Migraine without aura, with intractable migraine, so stated, without mention of status migrainosus     Past Surgical History:    History reviewed. No pertinent surgical history.    Family History:    History reviewed. No pertinent family history.    Social History:  The patient was accompanied to the ED by her .  Smoking Status: Former Smoker  Smokeless Tobacco: Never Used  Alcohol Use: Positive   Marital Status:  Single     Review of Systems " "  Eyes: Positive for visual disturbance.   Genitourinary: Positive for frequency.   Neurological: Positive for headaches.   All other systems reviewed and are negative.    Physical Exam     Patient Vitals for the past 24 hrs:   BP Temp Temp src Pulse Heart Rate Resp SpO2 Height Weight   06/16/19 0047 -- 98.6  F (37  C) Oral -- -- -- -- -- --   06/15/19 2343 (!) 144/103 -- -- 96 -- 20 95 % -- --   06/15/19 2245 (!) 141/92 -- -- 92 -- -- 95 % -- --   06/15/19 2230 124/79 -- -- 86 -- -- 94 % -- --   06/15/19 2215 (!) 142/98 -- -- 92 -- -- 94 % -- --   06/15/19 2145 (!) 145/95 -- -- 78 -- -- 98 % -- --   06/15/19 2049 -- -- -- -- -- -- -- 1.651 m (5' 5\") 100.5 kg (221 lb 9 oz)   06/15/19 2025 (!) 141/93 -- -- 91 -- -- 99 % -- --   06/15/19 2000 (!) 156/101 -- -- 97 -- -- 95 % -- --   06/15/19 1641 (!) 154/107 98  F (36.7  C) Oral -- 99 16 97 % -- --      Physical Exam  General: Alert, appears well-developed and well-nourished. Cooperative.     In mild distress  HEENT:  Head:  Atraumatic  Ears:  External ears are normal  Mouth/Throat:  Oropharynx is without erythema or exudate and mucous membranes are moist.   Eyes:   Conjunctivae normal and EOM are normal. No scleral icterus.    Pupils are equal, round, and reactive to light.   CV:  Normal rate, regular rhythm, normal heart sounds and radial pulses are 2+ and symmetric.  No murmur.  Resp:  Breath sounds are clear bilaterally    Non-labored, no retractions or accessory muscle use  GI:  Abdomen is soft, no distension, no tenderness. No rebound or guarding.  MS:  Normal range of motion. No edema.    Normal strength in all 4 extremities.     Back atraumatic.    No midline cervical, thoracic, or lumbar tenderness  Skin:  Warm and dry.  No rash or lesions noted.  Neuro: Alert. Normal strength.  Sensation intact in all 4 extremities. GCS: 15    Cranial nerves 2-12 intact.  Psych:  Normal mood and affect.    Emergency Department Course     ECG:  ECG taken at 1955, ECG read at " 2011  Normal sinus rhythm with sinus arrhythmia  Nonspecific T wave inversion in Lead III  No significant change from prior ECG on 06/08/17  Rate 89 bpm. HI interval 158 ms. QRS duration 80 ms. QT/QTc 376/457 ms. P-R-T axes 29 70 11.    Imaging:  Radiology findings were communicated with the patient who voiced understanding of the findings.    MR Brain w/o & w Contrast  Study is consistent with recent infarcts in the right and  left cerebellar hemispheres and in the right parietal lobe. No bleed  or mass effect.  Reading per radiology    MR Head w/o Contrast Angiogram  1. No occluded intracranial vessels are seen. The basilar artery and  vertebral arteries appear patent.  2. The vertebral arteries are patent. Fenestration right vertebral  artery. This is a normal variant. This is unchanged.  3. 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 This projects medially.  This can be seen in retrospect on  the scan of 12/8/2017 and it is unchanged.  Reading per radiology    CTA  HEAD NECK WITH CONTRAST  6/15/2019   Intimal flap in the proximal left vertebral artery  compatible with a small focal arterial dissection.  Reading per radiology     Laboratory:  Laboratory findings were communicated with the patient who voiced understanding of the findings.    UA: SpGrav 1.001, Bacteria Few  CBC: WBC 7.9, HGB 12.8,   BMP: Glucose 117, Calcium 8.3, o/w WNL (Creatinine 0.72)  HCG: Negative    Interventions:  1726 NS, 1 L, IV  1726 Compazine 10 mg IV  1814 Gadavist 10 mL IV  1954 Aspirin 325 mg PO    Emergency Department Course:    1655 Nursing notes and vitals reviewed.    1702 I performed an exam of the patient as documented above.     1723 IV was inserted and blood was drawn for laboratory testing, results above.     1730 The patient provided a urine sample here in the emergency department. This was sent for laboratory testing, findings above.     1815 The patient was sent for a MRI while in the  emergency department, results above.      1944 I spoke with Dr. Cantrell of the Stroke Neuro service regarding patient's presentation, findings, and plan of care.     2010 The patient was sent for a CT while in the emergency department, results above.      2045 I spoke with Dr. Cantrell of the Stroke Neuro service regarding patient's presentation, findings, and plan of care.     2055 I spoke with Dr. Cantrell of the Stroke Neuro service regarding patient's presentation, findings, and plan of care.    2100 I personally reviewed the imaging and lab results with the patient and answered all related questions prior to admission.    2147 I spoke with Dr. Saldaña of the Hospitalist service regarding patient's presentation, findings, and plan of care.     2205 I spoke with Dr. Villegas of the Vascular surgery service regarding patient's presentation, findings, and plan of care.     2210   I spoke with Dr. Saldaña who accepted admission    Impression & Plan      Medical Decision Making:  Sayda Valles is a 33-year-old female with a history of ocular migraines who presents with migraine onset approximately 12:55 PM this afternoon with followed by blurred vision changes starting approximately 1 PM.  Patient presents today because she is having persistent blurred vision but no headache.  Ultimately she has had a change in her migraine symptoms over recent months and has been talking with her neurologist and plans to have MRI imaging early this next week.  Due to persistent blurred vision here today we did obtain an MRI out of concern for potential sinister pathology.  MRI of the brain shows acute infarcts in the right and left cerebellar hemispheres and right parietal lobe.  There is no bleed or mass-effect.  MRA of the head does show a small 3.6 mm aneurysm at the level of the left anterior clinoid process.  CT angiogram of the head and neck was ultimately obtained showing intimal flap in the proximal left vertebral artery  compatible with a small focal arterial dissection.  The 3.6 mm aneurysm is also seen of the ophthalmic segment of the distal left internal carotid.  Spoke with Dr. Cantrell of stroke neurology who recommended full dose ASA and admission for further work up. Ultimately unclear to the exact etiology of the patient's newly diagnosed vertebral dissection this evening.  She does have the above history of massage and chiropractic manipulations historically, but none in recent days.  Reassuringly blood work is unremarkable patient is not currently pregnant EKG shows no concerning ischemic changes and no evidence of atrial fibrillation.  Her troponin is also undetectable lower concern for ACS.  Patient will be admitted for further stroke work-up.  I did speak briefly with Dr. Villegas of vascular surgery who recommended no surgical intervention at this time due to the vertebral dissection.  Spoke with Dr. Saldaña of the hospitalist service who agreed to inpatient admission at this time.    Diagnosis:    ICD-10-CM    1. 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 Cerebrovascular accident (CVA), unspecified mechanism (H) I63.9    3. Blurred vision H53.8    4. Cerebellar infarct (H) I63.9    5. Parietal lobe infarction (H) I63.89    6. 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    Disposition:   Admission     Scribe Disclosure:  IBeka, am serving as a scribe at 5:10 PM on 6/15/2019 to document services personally performed by August Vázquez MD based on my observations and the provider's statements to me.    [facility] EMERGENCY DEPARTMENT       August Vázquez MD  06/16/19 0122       August Vázquez MD  06/16/19 0122

## 2019-06-16 ENCOUNTER — APPOINTMENT (OUTPATIENT)
Dept: CARDIOLOGY | Facility: CLINIC | Age: 34
DRG: 064 | End: 2019-06-16
Attending: INTERNAL MEDICINE
Payer: COMMERCIAL

## 2019-06-16 VITALS
HEART RATE: 96 BPM | HEIGHT: 65 IN | OXYGEN SATURATION: 96 % | WEIGHT: 221.56 LBS | SYSTOLIC BLOOD PRESSURE: 143 MMHG | RESPIRATION RATE: 18 BRPM | DIASTOLIC BLOOD PRESSURE: 88 MMHG | TEMPERATURE: 98.7 F | BODY MASS INDEX: 36.91 KG/M2

## 2019-06-16 LAB
ANION GAP SERPL CALCULATED.3IONS-SCNC: 6 MMOL/L (ref 3–14)
BUN SERPL-MCNC: 4 MG/DL (ref 7–30)
CALCIUM SERPL-MCNC: 8 MG/DL (ref 8.5–10.1)
CHLORIDE SERPL-SCNC: 111 MMOL/L (ref 94–109)
CHOLEST SERPL-MCNC: 150 MG/DL
CO2 SERPL-SCNC: 24 MMOL/L (ref 20–32)
CREAT SERPL-MCNC: 0.61 MG/DL (ref 0.52–1.04)
ERYTHROCYTE [DISTWIDTH] IN BLOOD BY AUTOMATED COUNT: 13.7 % (ref 10–15)
GFR SERPL CREATININE-BSD FRML MDRD: >90 ML/MIN/{1.73_M2}
GLUCOSE SERPL-MCNC: 100 MG/DL (ref 70–99)
HCT VFR BLD AUTO: 38.8 % (ref 35–47)
HDLC SERPL-MCNC: 44 MG/DL
HGB BLD-MCNC: 12 G/DL (ref 11.7–15.7)
LDLC SERPL CALC-MCNC: 92 MG/DL
MCH RBC QN AUTO: 24.9 PG (ref 26.5–33)
MCHC RBC AUTO-ENTMCNC: 30.9 G/DL (ref 31.5–36.5)
MCV RBC AUTO: 81 FL (ref 78–100)
NONHDLC SERPL-MCNC: 106 MG/DL
PLATELET # BLD AUTO: 342 10E9/L (ref 150–450)
POTASSIUM SERPL-SCNC: 4 MMOL/L (ref 3.4–5.3)
RBC # BLD AUTO: 4.82 10E12/L (ref 3.8–5.2)
SODIUM SERPL-SCNC: 141 MMOL/L (ref 133–144)
TRIGL SERPL-MCNC: 72 MG/DL
WBC # BLD AUTO: 7.7 10E9/L (ref 4–11)

## 2019-06-16 PROCEDURE — 80061 LIPID PANEL: CPT | Performed by: INTERNAL MEDICINE

## 2019-06-16 PROCEDURE — 36415 COLL VENOUS BLD VENIPUNCTURE: CPT | Performed by: INTERNAL MEDICINE

## 2019-06-16 PROCEDURE — 25000132 ZZH RX MED GY IP 250 OP 250 PS 637: Performed by: INTERNAL MEDICINE

## 2019-06-16 PROCEDURE — 93306 TTE W/DOPPLER COMPLETE: CPT | Mod: 26 | Performed by: INTERNAL MEDICINE

## 2019-06-16 PROCEDURE — G0426 INPT/ED TELECONSULT50: HCPCS | Mod: G0 | Performed by: PSYCHIATRY & NEUROLOGY

## 2019-06-16 PROCEDURE — 40000894 ZZH STATISTIC OT IP EVAL DEFER: Performed by: STUDENT IN AN ORGANIZED HEALTH CARE EDUCATION/TRAINING PROGRAM

## 2019-06-16 PROCEDURE — 25000132 ZZH RX MED GY IP 250 OP 250 PS 637: Performed by: PSYCHIATRY & NEUROLOGY

## 2019-06-16 PROCEDURE — 80048 BASIC METABOLIC PNL TOTAL CA: CPT | Performed by: INTERNAL MEDICINE

## 2019-06-16 PROCEDURE — 25800025 ZZH RX 258: Performed by: INTERNAL MEDICINE

## 2019-06-16 PROCEDURE — 40000895 ZZH STATISTIC SLP IP EVAL DEFER: Performed by: SPEECH-LANGUAGE PATHOLOGIST

## 2019-06-16 PROCEDURE — 85027 COMPLETE CBC AUTOMATED: CPT | Performed by: INTERNAL MEDICINE

## 2019-06-16 PROCEDURE — 40000264 ECHOCARDIOGRAM COMPLETE

## 2019-06-16 PROCEDURE — 99239 HOSP IP/OBS DSCHRG MGMT >30: CPT | Performed by: INTERNAL MEDICINE

## 2019-06-16 PROCEDURE — 25800030 ZZH RX IP 258 OP 636: Performed by: INTERNAL MEDICINE

## 2019-06-16 RX ORDER — ACYCLOVIR 200 MG/1
30 CAPSULE ORAL ONCE
Status: COMPLETED | OUTPATIENT
Start: 2019-06-16 | End: 2019-06-16

## 2019-06-16 RX ORDER — ASPIRIN 81 MG/1
81 TABLET ORAL DAILY
Status: DISCONTINUED | OUTPATIENT
Start: 2019-06-17 | End: 2019-06-16 | Stop reason: HOSPADM

## 2019-06-16 RX ORDER — CLOPIDOGREL BISULFATE 75 MG/1
75 TABLET ORAL DAILY
Qty: 30 TABLET | Refills: 0 | Status: SHIPPED | OUTPATIENT
Start: 2019-06-17 | End: 2019-07-12

## 2019-06-16 RX ORDER — CLOPIDOGREL BISULFATE 75 MG/1
75 TABLET ORAL DAILY
Status: DISCONTINUED | OUTPATIENT
Start: 2019-06-17 | End: 2019-06-16 | Stop reason: HOSPADM

## 2019-06-16 RX ORDER — CLOPIDOGREL BISULFATE 75 MG/1
300 TABLET ORAL ONCE
Status: COMPLETED | OUTPATIENT
Start: 2019-06-16 | End: 2019-06-16

## 2019-06-16 RX ADMIN — SODIUM CHLORIDE: 9 INJECTION, SOLUTION INTRAVENOUS at 09:11

## 2019-06-16 RX ADMIN — CLOPIDOGREL BISULFATE 300 MG: 75 TABLET ORAL at 16:30

## 2019-06-16 RX ADMIN — ATORVASTATIN CALCIUM 10 MG: 10 TABLET, FILM COATED ORAL at 00:00

## 2019-06-16 RX ADMIN — ASPIRIN 325 MG: 325 TABLET, DELAYED RELEASE ORAL at 09:09

## 2019-06-16 RX ADMIN — SODIUM CHLORIDE 30 ML: 9 INJECTION, SOLUTION INTRAMUSCULAR; INTRAVENOUS; SUBCUTANEOUS at 14:27

## 2019-06-16 RX ADMIN — ACETAMINOPHEN 650 MG: 325 TABLET, FILM COATED ORAL at 11:57

## 2019-06-16 RX ADMIN — ACETAMINOPHEN 650 MG: 325 TABLET, FILM COATED ORAL at 18:24

## 2019-06-16 RX ADMIN — SODIUM CHLORIDE: 9 INJECTION, SOLUTION INTRAVENOUS at 00:00

## 2019-06-16 ASSESSMENT — ACTIVITIES OF DAILY LIVING (ADL)
BATHING: 0-->INDEPENDENT
TOILETING: 0-->INDEPENDENT
RETIRED_COMMUNICATION: 0-->UNDERSTANDS/COMMUNICATES WITHOUT DIFFICULTY
FALL_HISTORY_WITHIN_LAST_SIX_MONTHS: NO
ADLS_ACUITY_SCORE: 11
SWALLOWING: 0-->SWALLOWS FOODS/LIQUIDS WITHOUT DIFFICULTY
COGNITION: 0 - NO COGNITION ISSUES REPORTED
ADLS_ACUITY_SCORE: 18
ADLS_ACUITY_SCORE: 11
AMBULATION: 0-->INDEPENDENT
RETIRED_EATING: 0-->INDEPENDENT
DRESS: 0-->INDEPENDENT
TRANSFERRING: 0-->INDEPENDENT

## 2019-06-16 NOTE — CONSULTS
[facility]      Stroke Consult Note    Reason for Consult:  Non-emergent consult request for Dizziness    HPI  Sayda Valles is a 33 year old female who presents with dizziness. She states that while at a softball practice she noted sudden onset of dizziness and felt off balance. She tried walking and kept walking to the left side. This resolved over the day. She also noted that she was having head usual ocular migraine but loss of vision (left side) was unusual which brought her to the hospital. This has also resolved since onset. She states she is feeling back to herself now and has no issues. She has had similar symptom 1 week ago which also resolved. She does endorse having neck (center) end of May and underwent massage. She states that the pain worsened after the massage. She does also see chiropractor and has had neck manipulation but that was in late Feb. No recent neck manipulation. She also denies recent trauma/fall. She does have hx of migraine with pain in the back of the head along with ocular symptoms.     Stroke Evaluation summarized:  MRI/Head CT: Right and left cerebellar ischemic stroke, small right parietal ischemic stroke  Intracranial Vascular Imaging: Small left carotid cave/SH aneurysm  Cervical Carotid and Vertebral Artery Vascular Imaging: Left V1 segment dissection  Echocardiogram: +PFO, normal EF  EKG/Telemetry: Sinus  LDL: 92  A1c: 5.4  Troponin: Negative   Other testing: Not Applicable    Stroke Education  -Patient/family advised regarding healthy diet and regular aerobic exercise.  -Patient/family advised regarding the importance of tobacco cessation and discussed the use of adjunctive medications and nicotine supplementation  -Patient/family advised regarding the signs and symptoms of stroke and that immediate presentation to an ED for evaluation is critical.    PHQ-2 Depression Screening  Over the last 2 weeks, how many days have you noted: Never   (0 points) Several  (1  point) More than half  (2 points) Nearly all  (3 points)   Little interest/pleasure in doing things?   0      Feeling down, depressed, or hopeless? 0        PHQ-2 depression score: 0, consistent with a negative screen for depression.      Impression  Ischemic Stroke due to other etiology (left vertebral dissection)  Unruptured left carotid cave/SH aneurysm    Recommendations  Acute Ischemic Stroke (without tPA) Recommendations  - Neurochecks  - Normotension  - Euthermia, Euglycemia  - Daily aspirin for secondary stroke prevention; Plavix 300 mg now then 75 mg thereafter until seen in clinic at least  - No need for statin  - Telemetry, EKG  - Bedside Glucose Monitoring  - PT/OT/SLP  - PM&R  - Stroke Education  - Will follow up cerebral aneurysm as outpatient with me    Patient Follow-up    - Follow up with me in [facility] in 1 month with prior to clinic visit CTA head and neck   - Clinic number is [phone]    Please contact the Stroke Service with any questions.    Momo Glass MD  Neurology    Text Page ([phone])  __________________________________________________    Past Medical History:   Diagnosis Date     Allergic rhinitis, cause unspecified     Allergic rhinitis     Cervicalgia 3/17/2011     Encounter for other general counseling or advice on contraception 9/12/2007     Diagnosis updated by automated process. Provider to review and confirm.     Family history of malignant neoplasm of breast     4 generations on her father's side     FAMILY HX BREAST MALIG      Heart palpitations 2/10    PVC's - dr sandhu     Migraine without aura, with intractable migraine, so stated, without mention of status migrainosus     sees neurologist     Mild persistent asthma      Non morbid obesity due to excess calories 5/9/2016     Simple goiter 11/25/2008    pt has no enlargement and had normal blood work-up at that time     Stroke (H) 6/15/2019       Past Surgical History:   Procedure Laterality Date     NO HISTORY OF  SURGERY         Medications   Current Facility-Administered Medications   Medication     acetaminophen (TYLENOL) Suppository 650 mg     acetaminophen (TYLENOL) tablet 650 mg     albuterol (PROVENTIL) neb solution 2.5 mg     [START ON 6/17/2019] aspirin EC tablet 81 mg     bisacodyl (DULCOLAX) Suppository 10 mg     clopidogrel (PLAVIX) tablet 300 mg     [START ON 6/17/2019] clopidogrel (PLAVIX) tablet 75 mg     HYDROmorphone (PF) (DILAUDID) injection 0.2 mg     lidocaine (LMX4) cream     lidocaine 1 % 0.1-1 mL     melatonin tablet 1 mg     naloxone (NARCAN) injection 0.1-0.4 mg     nitroGLYcerin (NITROSTAT) sublingual tablet 0.4 mg     oxyCODONE (ROXICODONE) tablet 5-10 mg     polyethylene glycol (MIRALAX/GLYCOLAX) Packet 17 g     prochlorperazine (COMPAZINE) injection 10 mg    Or     prochlorperazine (COMPAZINE) tablet 10 mg    Or     prochlorperazine (COMPAZINE) Suppository 25 mg     senna-docusate (SENOKOT-S/PERICOLACE) 8.6-50 MG per tablet 1 tablet    Or     senna-docusate (SENOKOT-S/PERICOLACE) 8.6-50 MG per tablet 2 tablet     sodium chloride (PF) 0.9% PF flush 3 mL     sodium chloride (PF) 0.9% PF flush 3 mL       Medications Prior to Admission   Medication Sig Dispense Refill Last Dose     acetaminophen (TYLENOL) 500 MG tablet Take 1,000 mg by mouth 2 times daily   6/15/2019 at Unknown time     albuterol (PROAIR HFA/PROVENTIL HFA/VENTOLIN HFA) 108 (90 BASE) MCG/ACT Inhaler Inhale 1-2 puffs into the lungs every 4 hours as needed for shortness of breath / dyspnea or wheezing 1 Inhaler 1 prn     ibuprofen (ADVIL/MOTRIN) 600 MG tablet Take 600 mg by mouth 2 times daily as needed for moderate pain   6/15/2019 at am     loratadine (CLARITIN) 10 MG tablet Take 10 mg by mouth daily   6/15/2019 at Unknown time       Allergies   Allergies   Allergen Reactions     Zithromax [Azithromycin Dihydrate] GI Disturbance       Family History   Family History     Problem (# of Occurrences) Relation (Name,Age of Onset)    Asthma  (1) Mother (Thi)    Breast Cancer (3) Paternal Grandmother (Danuta): 3rd generation , Paternal Aunt, Other (Radha (paternal aunt))    Diabetes (4) Mother (Thi): gestational , Paternal Grandfather, Maternal Uncle (Rajan), Maternal Uncle (Jabari)    Heart Disease (1) Father (Rubens, 47): MI    Hyperlipidemia (3) Father (Rubens), Sister (Kelly), Paternal Half-Sister (Jaky)    Hypertension (1) Father (Rubens)    Lipids (2) Father (Rubens), Sister (Kelly)    Lung Cancer (1) Maternal Grandmother (Pamela)    Neurologic Disorder (1) Father (Rubens): epilepsy    Other Cancer (2) Maternal Grandfather (Jorge): Skin, Maternal Grandmother (Pamela): Lung    Ovarian Cancer (1) Maternal Grandmother (Pamela)       Negative family history of: Colon Cancer          Social History   Social History     Socioeconomic History     Marital status: Single     Spouse name: none     Number of children: Not on file     Years of education: Not on file     Highest education level: Not on file   Occupational History     Occupation:  at [employer]      Employer: OTHER   Social Needs     Financial resource strain: Not on file     Food insecurity:     Worry: Not on file     Inability: Not on file     Transportation needs:     Medical: Not on file     Non-medical: Not on file   Tobacco Use     Smoking status: Former Smoker     Packs/day: 0.50     Years: 2.00     Pack years: 1.00     Types: Cigarettes     Start date:      Last attempt to quit: 2001     Years since quittin.3     Smokeless tobacco: Never Used   Substance and Sexual Activity     Alcohol use: Yes     Comment: 0-0.5 glasses of wine per month     Drug use: No     Sexual activity: Not Currently     Partners: Male     Birth control/protection: Condom   Lifestyle     Physical activity:     Days per week: Not on file     Minutes per session: Not on file     Stress: Not on file   Relationships     Social connections:     Talks on phone: Not on file     Gets together: Not on  file     Attends [employer] service: Not on file     Active member of club or organization: Not on file     Attends meetings of clubs or organizations: Not on file     Relationship status: Not on file     Intimate partner violence:     Fear of current or ex partner: Not on file     Emotionally abused: Not on file     Physically abused: Not on file     Forced sexual activity: Not on file   Other Topics Concern      Service No     Blood Transfusions No     Caffeine Concern No     Occupational Exposure No     Hobby Hazards No     Sleep Concern No     Stress Concern No     Weight Concern Yes     Comment: Would like to lose more weight     Special Diet No     Back Care No     Exercise Yes     Comment: Moderate     Bike Helmet No     Seat Belt Yes     Comment: always      Self-Exams Yes     Comment: SBE encouraged monthly      Parent/sibling w/ CABG, MI or angioplasty before 65F 55M? Yes   Social History Narrative    Calcium - 2-3 dairy servings/ day     Menarche: at age 12    Menses: regular           ROS  The 10 point Review of Systems is negative other than noted in the HPI or here.     PHYSICAL EXAMINATION  B/P:     (!) 149/98 (06/16/19 0713)    Heart Rate: 95 (06/16/19 0713)    Pulse: 96 (06/15/19 2343)   Resp:  18 (06/16/19 0713)  Temp: 98.4  F (36.9  C)   Oral (06/16/19 0713)    General:  patient lying in bed without any acute distress    HEENT:  normocephalic/atraumatic  Cardio:  RRR  Pulmonary:  no respiratory distress  Abdomen:  soft  Extremities:  no edema  Skin:  intact     Neurologic  Mental Status:  alert, oriented x 3, follows commands, speech clear and fluent, naming and repetition normal  Cranial Nerves:  visual fields intact (tested by nurse), EOMI with normal smooth pursuit, facial sensation intact and symmetric (tested by nurse), facial movements symmetric, hearing not formally tested but intact to conversation, no dysarthria, shoulder shrug equal bilaterally, tongue protrusion midline  Motor:   no abnormal movements, able to move all limbs antigravity spontaneously with no signs of hemiparesis observed, no pronator drift  Reflexes:  unable to test (telestroke)  Sensory:  light touch sensation intact and symmetric throughout upper and lower extremities (assessed by nurse), no extinction on double simultaneous stimulation (assessed by nurse)  Coordination:  normal finger-to-nose and heel-to-shin bilaterally without dysmetria, rapid alternating movements symmetric  Station/Gait:  unable to test due to telestroke    Stroke Scales      NIHSS  Interval baseline (06/16/19 1516)   Interval Comments     1a. Level of Consciousness 0-->Alert, keenly responsive   1b. LOC Questions 0-->Answers both questions correctly   1c. LOC Commands 0-->Performs both tasks correctly   2.   Best Gaze 0-->Normal   3.   Visual 0-->No visual loss   4.   Facial Palsy 0-->Normal symmetrical movements   5a. Motor Arm, Left 0-->No drift, limb holds 90 (or 45) degrees for full 10 secs   5b. Motor Arm, Right 0-->No drift, limb holds 90 (or 45) degrees for full 10 secs   6a. Motor Leg, Left 0-->No drift, leg holds 30 degree position for full 5 secs   6b. Motor Leg, right 0-->No drift, leg holds 30 degree position for full 5 secs   7.   Limb Ataxia 0-->Absent   8.   Sensory 0-->Normal, no sensory loss   9.   Best Language 0-->No aphasia, normal   10. Dysarthria 0-->Normal   11. Extinction and Inattention  0-->No abnormality   Total 0 (06/16/19 1516)       Labs/Imaging  Labs and imaging were reviewed and used in developing plan; pertinent results included.  Data   CBC  WBC (10e9/L)   Date Value   06/16/2019 7.7   06/15/2019 7.9   05/15/2017 8.7    RBC Count (10e12/L)   Date Value   06/16/2019 4.82   06/15/2019 5.03   05/15/2017 4.86    Hemoglobin (g/dL)   Date Value   06/16/2019 12.0   06/15/2019 12.8   05/15/2017 13.4      Hematocrit (%)   Date Value   06/16/2019 38.8   06/15/2019 40.1   05/15/2017 39.4    Platelet Count (10e9/L)   Date Value    06/16/2019 342   06/15/2019 361   05/15/2017 327         BMP  Sodium (mmol/L)   Date Value   06/16/2019 141   06/15/2019 139   09/19/2018 139    Potassium (mmol/L)   Date Value   06/16/2019 4.0   06/15/2019 3.6   09/19/2018 4.7    Chloride (mmol/L)   Date Value   06/16/2019 111 (H)   06/15/2019 109   09/19/2018 107      Carbon Dioxide (mmol/L)   Date Value   06/16/2019 24   06/15/2019 25   09/19/2018 26    Glucose (mg/dL)   Date Value   06/16/2019 100 (H)   06/15/2019 117 (H)   09/19/2018 86    Urea Nitrogen (mg/dL)   Date Value   06/16/2019 4 (L)   06/15/2019 8   09/19/2018 9      Creatinine (mg/dL)   Date Value   06/16/2019 0.61   06/15/2019 0.72   09/19/2018 0.70    Calcium (mg/dL)   Date Value   06/16/2019 8.0 (L)   06/15/2019 8.3 (L)   09/19/2018 8.3 (L)         INR Troponin I A1C   No results found for: INR Troponin I ES (ug/L)   Date Value   06/15/2019 <0.015    Hemoglobin A1C (%)   Date Value   06/11/2003 5.4        Liver Panel  Protein Total (g/dL)   Date Value   09/19/2018 7.1   07/21/2014 6.7 (L)   05/11/2007 7.2    Albumin (g/dL)   Date Value   09/19/2018 3.6   07/21/2014 3.7 (L)   05/11/2007 4.0    Bilirubin Total (mg/dL)   Date Value   09/19/2018 0.6   07/21/2014 0.4   05/11/2007 0.4      Alkaline Phosphatase (U/L)   Date Value   09/19/2018 55   07/21/2014 48   05/11/2007 52    AST (U/L)   Date Value   09/19/2018 19   07/21/2014 22   05/11/2007 23    ALT (U/L)   Date Value   09/19/2018 23   07/21/2014 23   05/11/2007 20      No results found for: BILIDIRECT       Lipid Profile  Cholesterol (mg/dL)   Date Value   06/16/2019 150   09/19/2018 141   03/11/2016 144    HDL Cholesterol (mg/dL)   Date Value   06/16/2019 44 (L)   09/19/2018 40 (L)   03/11/2016 49 (L)    LDL Cholesterol Calculated (mg/dL)   Date Value   06/16/2019 92   09/19/2018 83   03/11/2016 79      Triglycerides (mg/dL)   Date Value   06/16/2019 72   09/19/2018 88   03/11/2016 78    Cholesterol/HDL Ratio (no units)   Date Value    07/21/2014 3.8   03/23/2012 3.8   03/16/2011 3.3              I have personally spent a total of 50 minutes providing care and consulting with this patient's medical providers today, with more than 50% of this time spent in consultation, coordination of care, and discussion with the patient and/or family regarding diagnostic results, prognosis, symptom management, risks and benefits of management options, and development of plan of care.     Telestroke Service Details  (for non-emergent stroke consult with tele)  Video start time 06/16/19   1530   Video end time 06/16/19   1605   Type of service telemedicine diagnostic assessment of acute neurological changes   Reason telemedicine is appropriate patient requires assessment with a specialist for diagnosis and treatment of neurological symptoms   Mode of transmission secure interactive audio and video communication per [employer]   Originating site (patient location) [facility]    Distant site (provider location) [facility]

## 2019-06-16 NOTE — PLAN OF CARE
"Pt had telestroke, echo and started on plavix.  Pt discharged at [place] [facility] and hospitalist at 1825. Family accompanied patient to transport home.  Pt was educated on stroke symptoms, gave booklet and educated on timing and indications of meds.  Pt c/o minimal headache pain, no other complaints were noted.  /88 (BP Location: Right arm)   Pulse 96   Temp 98.7  F (37.1  C) (Oral)   Resp 18   Ht 1.651 m (5' 5\")   Wt 100.5 kg (221 lb 9 oz)   SpO2 96%   BMI 36.87 kg/m     "

## 2019-06-16 NOTE — PLAN OF CARE
Discharge Planner SLP   Patient plan for discharge: did not discuss  Current status: Orders received, chart reviewed, and spoke with nursing who reports no difficulties with swallowing and patient passed swallow screen. Checked in with patient who has no concerns with swallowing, speech, or communication changes. Vision has resolved as well. No formal assessment indicated at this time. Barriers to return to prior living situation: None identified  Recommendations for discharge: return home with no SLP indicated  Rationale for recommendations: Patient is at her baseline for swallowing, speech, and communication.       Entered by: Thuan Tee 06/16/2019 11:20 AM

## 2019-06-16 NOTE — ED NOTES
This RN observed swallowing of water at MRI. Patient able to swallow without difficulty. No dysphasia, coughing, or aspiration noted.

## 2019-06-16 NOTE — PLAN OF CARE
Patient arrived to floor from ER at 2300, alert, and oriented x4. Up SBA. Denies pain and nausea. Wearing sunglasses stating the lights are bothering her eyes. C/o of blurry vision. Neuro check WDL. Started on Lipitor. Low fat, low Na, no caffeine diet. No difficulty swallowing water, pills, and food. IV fluids running. On tele, per tele tech SR, HR 70. Neurology, PT, OT, and speech consulted. Family at bedside.

## 2019-06-16 NOTE — PLAN OF CARE
OT: Order received, chart reviewed, pt presented to ED with blurred vision and headache    Discharge Planner OT   Patient plan for discharge: home  Current status: pt's MRI revealed recent infarcts in R and L cerebellar hemispheres and R parietal lobe, CT revealed L vertebral artery dissection. Met with pt and several family members in pt's room, pt observed to be walking independently, demonstrated independence with ADL tasks; pt reports vision has returned to baseline, denies any symptoms or activity intolerance limiting her ADL/IADLs and mobility, currently pt waiting to see neurology to determine plan of care  Barriers to return to prior living situation: none anticipated  Recommendations for discharge: home  Rationale for recommendations: no skilled IP OT needs, pt has recovered and is at baseline for ADL/IADLs and mobility at this time.       Entered by: Radha Steele 06/16/2019 12:10 PM

## 2019-06-16 NOTE — H&P
[facility]    History and Physical - Hospitalist Service       Date of Admission:  6/15/2019    Assessment & Plan   Sayad Valles is a 33 year old female admitted on 6/15/2019. She has a past medical history significant for asthma and migraine headaches.  She presented to emergency room with blurred vision.  Found to have acute stroke and vertebral artery dissection.    1.  Stroke.  Stroke neurology consult.  Physical and occupational therapy consults.  Speech pathology consult.  Aspirin 325 mg a day.  Atorvastatin 10 mg a day.  Check lipid panel.  Monitor on telemetry.  Check echocardiogram.    2.  Left vertebral artery dissection.  Case was discussed with vascular surgery by emergency room physician per report.  Recommendation at this time is to continue aspirin.  Stroke neurology consult.    3.  Migraine headaches.  Pain medications if needed.    4.  Asthma.  Albuterol if needed.     Diet: Cardiac diet.  DVT Prophylaxis: Pneumatic Compression Devices  Nicole Catheter: not present  Code Status: Full code.        Freddie Powell, DO  [facility]    ______________________________________________________________________    Chief Complaint   Blurred vision.    History is obtained from the patient    History of Present Illness   Sayda Valles is a 33 year old female who has a past medical history significant for migraine headaches.  She frequently gets ocular migraines.  Her symptoms with her ocular migraines are usually silver flashes of light in her vision.  Earlier today, she had silver flashes consistent with her usual ocular migraines.  However, unlike her usual migraines, she continued to have blurred vision after she had a silver flashes in her vision.  She presented to emergency room for further evaluation.  She is not having any headache.  Does continue to have blurred vision at this time.  No other complaints currently.  She has been found to have stroke and left vertebral artery  dissection.    Review of Systems    The 10 point Review of Systems is negative other than noted in the HPI     Past Medical History    I have reviewed this patient's medical history and updated it with pertinent information if needed.   Past Medical History:   Diagnosis Date     Allergic rhinitis, cause unspecified     Allergic rhinitis     Cervicalgia 3/17/2011     Encounter for other general counseling or advice on contraception 2007     Diagnosis updated by automated process. Provider to review and confirm.     Family history of malignant neoplasm of breast     4 generations on her father's side     FAMILY HX BREAST MALIG      Heart palpitations 2/10    PVC's - dr sandhu     Migraine without aura, with intractable migraine, so stated, without mention of status migrainosus     sees neurologist     Mild persistent asthma      Non morbid obesity due to excess calories 2016     Simple goiter 2008    pt has no enlargement and had normal blood work-up at that time       Past Surgical History   I have reviewed this patient's surgical history and updated it with pertinent information if needed.  Past Surgical History:   Procedure Laterality Date     NO HISTORY OF SURGERY         Social History   I have reviewed this patient's social history and updated it with pertinent information if needed.  Social History     Tobacco Use     Smoking status: Former Smoker     Packs/day: 0.50     Years: 2.00     Pack years: 1.00     Types: Cigarettes     Start date:      Last attempt to quit: 2001     Years since quittin.3     Smokeless tobacco: Never Used   Substance Use Topics     Alcohol use: Yes     Comment: 0-0.5 glasses of wine per month     Drug use: No       Family History   I have reviewed this patient's family history and updated it with pertinent information if needed.   Family History   Problem Relation Age of Onset     Lipids Father      Neurologic Disorder Father         epilepsy     Heart Disease  Father 47        MI     Hypertension Father      Hyperlipidemia Father      Lipids Sister      Hyperlipidemia Sister      Breast Cancer Paternal Grandmother         3rd generation      Breast Cancer Paternal Aunt      Diabetes Mother         gestational      Asthma Mother      Diabetes Paternal Grandfather      Other Cancer Maternal Grandfather         Skin     Hyperlipidemia Paternal Half-Sister      Breast Cancer Other      Other Cancer Maternal Grandmother         Lung     Ovarian Cancer Maternal Grandmother      Lung Cancer Maternal Grandmother      Diabetes Maternal Uncle      Diabetes Maternal Uncle      Colon Cancer No family hx of        Prior to Admission Medications   Prior to Admission Medications   Prescriptions Last Dose Informant Patient Reported? Taking?   acetaminophen (TYLENOL) 500 MG tablet 6/15/2019 at Unknown time  Yes Yes   Sig: Take 1,000 mg by mouth 2 times daily   albuterol (PROAIR HFA/PROVENTIL HFA/VENTOLIN HFA) 108 (90 BASE) MCG/ACT Inhaler prn  No Yes   Sig: Inhale 1-2 puffs into the lungs every 4 hours as needed for shortness of breath / dyspnea or wheezing   ibuprofen (ADVIL/MOTRIN) 600 MG tablet 6/15/2019 at am  Yes Yes   Sig: Take 600 mg by mouth 2 times daily as needed for moderate pain   loratadine (CLARITIN) 10 MG tablet 6/15/2019 at Unknown time  Yes Yes   Sig: Take 10 mg by mouth daily      Facility-Administered Medications: None     Allergies   Allergies   Allergen Reactions     Zithromax [Azithromycin Dihydrate] GI Disturbance       Physical Exam   Vital Signs: Temp: 98  F (36.7  C) Temp src: Oral BP: 124/79 Pulse: 86 Heart Rate: 99 Resp: 16 SpO2: 94 % O2 Device: None (Room air)    Weight: 221 lbs 9 oz    Gen:  NAD, A&Ox3.  Eyes:  PERRL, sclera anicteric.  OP:  MMM, no lesions.  Neck:  Supple.  CV:  Regular, no murmurs.  Lung:  CTA b/l, normal effort.  Ab:  +BS, soft.  Skin:  Warm, dry to touch.  No rash.  Ext:  No pitting edema LE b/l.      Data   Data reviewed today: I  reviewed all medications, new labs and imaging results over the last 24 hours. I personally reviewed the EKG tracing showing Sinus rhythm.  No ischemia.    Recent Labs   Lab 06/15/19  1951 06/15/19  1703   WBC  --  7.9   HGB  --  12.8   MCV  --  80   PLT  --  361   NA  --  139   POTASSIUM  --  3.6   CHLORIDE  --  109   CO2  --  25   BUN  --  8   CR  --  0.72   ANIONGAP  --  5   PRANAV  --  8.3*   GLC  --  117*   TROPI <0.015  --

## 2019-06-16 NOTE — ED NOTES
"[facility]  ED Nurse Handoff Report    Sayda Valles is a 33 year old female   ED Chief complaint: Headache  . 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 Activity Level - Current:   Stand by Assist. Lift room needed: No. Bariatric: No   Needed: No   Isolation: No. Infection: Not Applicable.     Vital Signs:   Vitals:    06/15/19 1641 06/15/19 2000 06/15/19 2025 06/15/19 2049   BP: (!) 154/107 (!) 156/101 (!) 141/93    Pulse:  97 91    Resp: 16      Temp: 98  F (36.7  C)      TempSrc: Oral      SpO2: 97% 95% 99%    Weight:    100.5 kg (221 lb 9 oz)   Height:    1.651 m (5' 5\")       Cardiac Rhythm:  ,      Pain level: 0-10 Pain Scale: 4  Patient confused: No. Patient Falls Risk: Yes.   Elimination Status: Has voided   Patient Report - Initial Complaint: Headache. Focused Assessment:  Sayda Valles is a 33 year old female with a history of occular migraines who presents to the emergency department today for evaluation of a headache. The patient has been getting changing migraines, and has an MRI scheduled for 3 days from now. She last had an MRI in 2017, shown below.     Here, she states that this morning she woke up and took 3 ibuprofen at 0930 because she was feeling some tension that she assumed would turn into a migraine. It wasn't until 1255 that she started feeling hot and off balance. She sat down and started having an occular migraine. This affected both of her eyes, when normally it is just her right. She states she was having \"silver flashers\". She also had one 2 weeks ago with hand tingling which is abnormal.      She has no headache now but continues to have the visual disturbances. She " also is very thirsty and has been frequently urinating  and has concern for possible diabetes as she has a family history. No history of stroke     20:28 Neurological Cognitive - Cognitive/Neuro/Behavioral WDL: -WDL except; all  Level Of Consciousness: alert  Arousal Level: opens eyes spontaneously  Orientation: oriented x 4  Follows Commands: yes  Speech: clear; spontaneous; logical  Best Language: 0 - No aphasia   [place] Coma Scale - Best Eye Response: 4-->(E4) spontaneous  Best Motor Response: 6-->(M6) obeys commands  Best Verbal Response: 5-->(V5) oriented  [place] Coma Scale Score: 15   Pupils (CN II) - Pupil PERRLA: yes   Motor Strength - Left Upper: 5 - active movement against gravity and full resistance  Right Upper: 5 - active movement against gravity and full resistance  Left Lower: 5 - active movement against gravity and full resistance  Right Lower: 5 - active movement against gravity and full resistance   Sensory Assessment - Sensation - All Extremities: no impairment   Additional Neuro Documentation - Headache: Visual Disturbance (Blurry vision)     *Headache is better blurry vision still present  Tests Performed:   Labs Ordered and Resulted from Time of ED Arrival Up to the Time of Departure from the ED   CBC WITH PLATELETS DIFFERENTIAL - Abnormal; Notable for the following components:       Result Value    MCH 25.4 (*)     All other components within normal limits   BASIC METABOLIC PANEL - Abnormal; Notable for the following components:    Glucose 117 (*)     Calcium 8.3 (*)     All other components within normal limits   ROUTINE UA WITH MICROSCOPIC - Abnormal; Notable for the following components:    Specific Gravity Urine 1.001 (*)     Bacteria Urine Few (*)     All other components within normal limits   HCG QUALITATIVE   TROPONIN I     CTA Head Neck with Contrast   Preliminary Result   IMPRESSION: Intimal flap in the proximal left vertebral artery   compatible with a small focal arterial  dissection.         MR Brain w/o & w Contrast   Final Result   IMPRESSION:  Study is consistent with recent infarcts in the right and   left cerebellar hemispheres and in the right parietal lobe. No bleed   or mass effect.      DAMARIS HOLLY MD      MR Head w/o Contrast Angiogram   Final Result   IMPRESSION:     1. No occluded intracranial vessels are seen. The basilar artery and   vertebral arteries appear patent.   2. The vertebral arteries are patent. Fenestration right vertebral   artery. This is a normal variant. This is unchanged.   3. 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 This projects medially.  This can be seen in retrospect on   the scan of 12/8/2017 and it is unchanged.         DAMARIS HOLLY MD            Treatments provided: See MAR  Family Comments: at bedside  OBS brochure/video discussed/provided to patient:  N/A  ED Medications:   Medications   0.9% sodium chloride BOLUS (0 mLs Intravenous Stopped 6/15/19 2028)   prochlorperazine (COMPAZINE) injection 10 mg (10 mg Intravenous Given 6/15/19 1726)   gadobutrol (GADAVIST) injection 10 mL (10 mLs Intravenous Given 6/15/19 1814)   aspirin (ASA) tablet 325 mg (325 mg Oral Given 6/15/19 1954)   iopamidol (ISOVUE-370) solution 500 mL (70 mLs Intravenous Given 6/15/19 2012)   sodium chloride (PF) 0.9% PF flush 100 mL (80 mLs Intravenous Given 6/15/19 2012)     Drips infusing:  No  For the majority of the shift, the patient's behavior Green. Interventions performed were rounding.     Severe Sepsis OR Septic Shock Diagnosis Present: No      ED Nurse Name/Phone Number: Geovanna Vazquez,   9:06 PM  RECEIVING UNIT ED HANDOFF REVIEW    Above ED Nurse Handoff Report was reviewed: Yes  Reviewed by: Bertha Lee on Scarlett 15, 2019 at 11:07 PM

## 2019-06-16 NOTE — DISCHARGE SUMMARY
[facility]  Hospitalist Discharge Summary       Date of Admission:  6/15/2019  Date of Discharge:  6/16/2019  Discharging Provider: Freddie Powell DO      Discharge Diagnoses   1.  Stroke.  Stroke neurology consult obtained.  Does need to remain on aspirin 81 mg a day.  Clopidogrel 75 mg a day.  Does not need statin medication per neurology.  Was on atorvastatin during hospital stay.  Also on enoxaparin during hospital stay.  Needs to follow-up with neurology within 4 weeks.  Also needs to follow-up with primary care physician within 1 week for reevaluation of blood pressure.  Echocardiogram does show a positive bubble study likely indicating PFO.  Medications as started above neurology does recommend antiplatelet for the PFO.     2.  Left vertebral artery dissection.  Aspirin and clopidogrel as noted above.  Follow-up with stroke neurology in 4 weeks.     3.  Migraine headaches. Tylenol as needed.    4.  PFO.  Continue aspirin and clopidogrel.  Follow-up with neurology in 4 weeks.    5.  Left internal artery aneurysm.  Continue to follow-up with stroke neurology in 4 weeks.               Follow-ups Needed After Discharge   Follow-up Appointments     Follow-up and recommended labs and tests       [facility], within 7   days for hospital follow- up.  Follow up with neurology within 4 weeks.             Discharge Disposition   Discharged to home  Condition at discharge: Stable        Consultations This Hospital Stay   PHARMACY TO DOSE HEPARIN  PHARMACY TO DOSE HEPARIN  NEUROLOGY IP CONSULT  SWALLOW EVAL SPEECH PATH AT BEDSIDE IP CONSULT  PHYSICAL THERAPY ADULT IP CONSULT  OCCUPATIONAL THERAPY ADULT IP CONSULT    Code Status   Full Code    Time Spent on this Encounter   I spent 35 minutes with Ms. Valles and working on discharge on 6/16/19.       Freddie Powell DO  [facility]  Hospital  ______________________________________________________________________    Physical Exam   Vital Signs: Temp: 98.7  F (37.1  C) Temp src: Oral BP: 143/88 Pulse: 96 Heart Rate: 91 Resp: 18 SpO2: 96 % O2 Device: None (Room air)    Weight: 221 lbs 9 oz  Gen:  NAD, A&Ox3.  Eyes:  PERRL, sclera anicteric.  OP:  MMM, no lesions.  Neck:  Supple.  CV:  Regular, no murmurs.  Lung:  CTA b/l, normal effort.  Ab:  +BS, soft.  Skin:  Warm, dry to touch.  No rash.  Ext:  No pitting edema LE b/l.         Primary Care Physician   [facility]    Discharge Orders      Reason for your hospital stay    stroke     Follow-up and recommended labs and tests     Follow up with primary care provider, [facility], within 7 days for hospital follow- up.  Follow up with neurology within 4 weeks.     Activity    Your activity upon discharge: activity as tolerated     Diet    Follow this diet upon discharge: Cardiac           Discharge Medications   Current Discharge Medication List      START taking these medications    Details   aspirin (ASA) 81 MG EC tablet Take 1 tablet (81 mg) by mouth daily    Associated Diagnoses: Cerebrovascular accident (CVA), unspecified mechanism (H)      clopidogrel (PLAVIX) 75 MG tablet Take 1 tablet (75 mg) by mouth daily  Qty: 30 tablet, Refills: 0    Associated Diagnoses: Cerebrovascular accident (CVA), unspecified mechanism (H)         CONTINUE these medications which have NOT CHANGED    Details   acetaminophen (TYLENOL) 500 MG tablet Take 1,000 mg by mouth 2 times daily      albuterol (PROAIR HFA/PROVENTIL HFA/VENTOLIN HFA) 108 (90 BASE) MCG/ACT Inhaler Inhale 1-2 puffs into the lungs every 4 hours as needed for shortness of breath / dyspnea or wheezing  Qty: 1 Inhaler, Refills: 1    Associated Diagnoses: Mild persistent asthma without complication      loratadine (CLARITIN) 10 MG tablet Take 10 mg by mouth daily         STOP taking these medications       ibuprofen (ADVIL/MOTRIN)  600 MG tablet Comments:   Reason for Stopping:             Allergies   Allergies   Allergen Reactions     Zithromax [Azithromycin Dihydrate] GI Disturbance

## 2019-06-16 NOTE — PLAN OF CARE
Discharge Planner PT   Patient plan for discharge: Home   Current status: Orders received and chart reviewed. Per chart review and discussion with OT, patient mobilizing and ambulating independently with no A needed and no balance difficulties noted. No skilled IP PT needs identified, will complete orders at this time.   Barriers to return to prior living situation: None anticipated from mobility stand point   Recommendations for discharge: Home   Rationale for recommendations: Patient mobilizing/ambulating at baseline, independent with no balance deficits identified. No skilled IP PT needs identified, will complete orders at this time.        Entered by: Fiona Bond 06/16/2019 12:12 PM

## 2019-06-16 NOTE — PHARMACY-ADMISSION MEDICATION HISTORY
Admission medication history interview status for this patient is complete. See [facility] admission navigator for allergy information, prior to admission medications and immunization status.     Medication history interview source(s):Patient  Medication history resources (including written lists, pill bottles, clinic record):None    Changes made to PTA medication list:  Added: APAP, Ibuprofen  Deleted: flexeril and flovent HFA  Changed: none    Actions taken by pharmacist (provider contacted, etc):None     Additional medication history information:None    Medication reconciliation/reorder completed by provider prior to medication history? No    For patients on insulin therapy: no (Yes/No)   Lantus/levemir/NPH/Mix 70/30 dose: ___ in AM/PM or twice daily   Sliding scale Novolog Y/N   If Yes, do you have a baseline novolog pre-meal dose: ______units with meals   Patients eat three meals a day: Y/N ---  How many episodes of hypoglycemia (low blood glucose) do you have weekly: ---   How many missed doses do you have a week: ---  How many times do you check your blood glucose per day: ---  Any Barriers to therapy: cost of medications/comfortable with giving injections (if applicable)/ comfortable and confident with current diabetes regimen ---      Prior to Admission medications    Medication Sig Last Dose Taking? Auth Provider   acetaminophen (TYLENOL) 500 MG tablet Take 1,000 mg by mouth 2 times daily 6/15/2019 at Unknown time Yes Unknown, Entered By History   albuterol (PROAIR HFA/PROVENTIL HFA/VENTOLIN HFA) 108 (90 BASE) MCG/ACT Inhaler Inhale 1-2 puffs into the lungs every 4 hours as needed for shortness of breath / dyspnea or wheezing prn Yes Anna Persaud PA-C   ibuprofen (ADVIL/MOTRIN) 600 MG tablet Take 600 mg by mouth 2 times daily as needed for moderate pain 6/15/2019 at am Yes Unknown, Entered By History   loratadine (CLARITIN) 10 MG tablet Take 10 mg by mouth daily 6/15/2019 at Unknown time Yes  Reported, Patient

## 2019-06-16 NOTE — ED NOTES
"This RN was called by MRI tech for patient refusing MRI. Patient vocalizes concerns about \"taking medication\" as well as her \"mouth is too dry I have to drink water every couple of seconds\". This RN educated patient and family about necessity of procedure and delay to patient care if refused. This RN explained that ultimately it is the patient's decision, but it would need to be made quickly.  "

## 2019-06-17 ENCOUNTER — TELEPHONE (OUTPATIENT)
Dept: FAMILY MEDICINE | Facility: CLINIC | Age: 34
End: 2019-06-17

## 2019-06-17 LAB — INTERPRETATION ECG - MUSE: NORMAL

## 2019-06-17 NOTE — TELEPHONE ENCOUNTER
"    Hospital/TCU/ED for chronic condition Discharge Protocol       \"Hi, my name is Rama Heaton, a registered nurse, and I am calling from [facility].  I am calling to follow up and see how things are going for you after your recent emergency visit/hospital/TCU stay.\" Tell me how you are doing now that you are home?\" I'm doing okay. a little not myself. I'm worried about things and there it is a lot of info to take in. I have a mild headace, restlessness, didn't sleep well the past 2 nights. Had some caffiene this morning to see if it would help my headache, I think I will stay off. They had me off in the hospital.     I don't have a primary, will be seeing Brittany Ginger tomorrow. I had to take a CHRIS from work, I will nee paperwork filled out.\" Advised to bring to appointment but may take up to 72 business hours to fill out.   On plavix and baby asa. Had a hard time sleeping last night due to trying to deal with and take everything in.   May need something for anxiety. Has not taken in past, but feels at this time it may be helpful. Is asking if okay to take Melatonin. Analized with Up to Date and no interactions with Plavix or baby ASA, we discussed trying this for help sleeping. Informed is natural and body makes this chemical, non habit forming. Can try lower dose 3 mg and has room to increase mg if needed to promote rest.     Took Tylenol last night 1000 mg. This helped with her headache.       Discharge Instructions    \"Let's review your discharge instructions.  What is/are the follow-up recommendations?  Pt. Response: I have no questions on this, they spent a lot of time explaining this to me yesterday in the hospital.     \"Has an appointment with your primary care provider been scheduled?\"   Yes. (confirm)    \"When you see the provider, I would recommend that you bring your medications with you.\"    Medications    \"Tell me what changed about your medicines when you discharged?\"    Changes to chronic " "meds?    2 or more - Epic MTM referral needed    \"What questions do you have about your medications?\"   Had questions about interactions with new medications. Okay to take Tylenol for headache etc.      New diagnoses of heart failure, COPD, diabetes, or MI?    No              Medication reconciliation completed? Yes  Was MTM referral placed (*Make sure to put transitions as reason for referral)?   Yes - \"The Medication Therapy  will call you within the next few days to schedule an appointment with an MTM pharmacist to discuss your medicines and make sure they are working as well as they possibly can for you. This visit can be done in a [facility] or on the phone and is at no charge to you.\"    Call Summary    \"What questions or concerns do you have about your recent visit and your follow-up care?\"  None, see above.        \"If you have questions or things don't continue to improve, we encourage you contact us through the main clinic number (give number).  Even if the clinic is not open, triage nurses are available 24/7 to help you.     We would like you to know that our clinic has extended hours (provide information).  We also have urgent care (provide details on closest location and hours/contact info)\"      \"Thank you for your time and take care!\"             "

## 2019-06-17 NOTE — TELEPHONE ENCOUNTER
Pt was DC'd from [facility] on 6/16/2019 after being treated for Dissecting Hemorrhage Of Left Vertebral Artery (H).  Next scheduled appt with PCP is 6/18/2019.  Please call patient.  Thank you!  Iram Anna

## 2019-06-18 ENCOUNTER — OFFICE VISIT (OUTPATIENT)
Dept: FAMILY MEDICINE | Facility: CLINIC | Age: 34
End: 2019-06-18
Payer: COMMERCIAL

## 2019-06-18 VITALS
SYSTOLIC BLOOD PRESSURE: 124 MMHG | OXYGEN SATURATION: 99 % | DIASTOLIC BLOOD PRESSURE: 92 MMHG | HEART RATE: 107 BPM | HEIGHT: 65 IN | WEIGHT: 216 LBS | BODY MASS INDEX: 35.99 KG/M2 | TEMPERATURE: 98.2 F

## 2019-06-18 DIAGNOSIS — I77.74 DISSECTING HEMORRHAGE OF LEFT VERTEBRAL ARTERY (H): ICD-10-CM

## 2019-06-18 DIAGNOSIS — Q21.12 PFO (PATENT FORAMEN OVALE): ICD-10-CM

## 2019-06-18 DIAGNOSIS — Z02.89 ENCOUNTER FOR COMPLETION OF FORM WITH PATIENT: ICD-10-CM

## 2019-06-18 DIAGNOSIS — Z09 HOSPITAL DISCHARGE FOLLOW-UP: Primary | ICD-10-CM

## 2019-06-18 DIAGNOSIS — F41.9 ANXIETY: ICD-10-CM

## 2019-06-18 DIAGNOSIS — R35.0 URINARY FREQUENCY: ICD-10-CM

## 2019-06-18 DIAGNOSIS — I63.9 CEREBROVASCULAR ACCIDENT (CVA), UNSPECIFIED MECHANISM (H): ICD-10-CM

## 2019-06-18 LAB
ALBUMIN UR-MCNC: NEGATIVE MG/DL
APPEARANCE UR: CLEAR
BILIRUB UR QL STRIP: NEGATIVE
COLOR UR AUTO: YELLOW
GLUCOSE UR STRIP-MCNC: NEGATIVE MG/DL
HGB UR QL STRIP: NEGATIVE
KETONES UR STRIP-MCNC: ABNORMAL MG/DL
LEUKOCYTE ESTERASE UR QL STRIP: NEGATIVE
NITRATE UR QL: NEGATIVE
PH UR STRIP: 5.5 PH (ref 5–7)
SOURCE: ABNORMAL
SP GR UR STRIP: <=1.005 (ref 1–1.03)
UROBILINOGEN UR STRIP-ACNC: 0.2 EU/DL (ref 0.2–1)

## 2019-06-18 PROCEDURE — 99495 TRANSJ CARE MGMT MOD F2F 14D: CPT | Performed by: NURSE PRACTITIONER

## 2019-06-18 PROCEDURE — 81003 URINALYSIS AUTO W/O SCOPE: CPT | Performed by: NURSE PRACTITIONER

## 2019-06-18 RX ORDER — HYDROXYZINE HYDROCHLORIDE 25 MG/1
25 TABLET, FILM COATED ORAL
Qty: 30 TABLET | Refills: 1 | Status: SHIPPED | OUTPATIENT
Start: 2019-06-18 | End: 2020-09-15

## 2019-06-18 ASSESSMENT — ANXIETY QUESTIONNAIRES
5. BEING SO RESTLESS THAT IT IS HARD TO SIT STILL: SEVERAL DAYS
2. NOT BEING ABLE TO STOP OR CONTROL WORRYING: SEVERAL DAYS
7. FEELING AFRAID AS IF SOMETHING AWFUL MIGHT HAPPEN: SEVERAL DAYS
3. WORRYING TOO MUCH ABOUT DIFFERENT THINGS: SEVERAL DAYS
GAD7 TOTAL SCORE: 6
6. BECOMING EASILY ANNOYED OR IRRITABLE: NOT AT ALL
1. FEELING NERVOUS, ANXIOUS, OR ON EDGE: SEVERAL DAYS
IF YOU CHECKED OFF ANY PROBLEMS ON THIS QUESTIONNAIRE, HOW DIFFICULT HAVE THESE PROBLEMS MADE IT FOR YOU TO DO YOUR WORK, TAKE CARE OF THINGS AT HOME, OR GET ALONG WITH OTHER PEOPLE: SOMEWHAT DIFFICULT

## 2019-06-18 ASSESSMENT — MIFFLIN-ST. JEOR: SCORE: 1680.65

## 2019-06-18 ASSESSMENT — PATIENT HEALTH QUESTIONNAIRE - PHQ9
5. POOR APPETITE OR OVEREATING: SEVERAL DAYS
SUM OF ALL RESPONSES TO PHQ QUESTIONS 1-9: 4

## 2019-06-18 NOTE — PATIENT INSTRUCTIONS
- Follow up with Dr. Glass [facility] in 1 month with prior to clinic visit CTA head and neck     - Clinic number is [phone]        Patient Education     Tips for a Low-Sodium Diet  If you have high blood pressure, heart failure, liver problems or kidney problems, you may need to watch your sodium intake. Too much sodium can cause thirst and shortness of breath. It can also make your body retain extra fluids.  You should limit the amount of sodium in your diet to mg per day.  Sodium is found in many foods. Most of our sodium comes from  processed  foods like canned soups, lunch meats and TV dinners.  A major source of sodium is salt, or sodium chloride. Salt is often used to preserve foods (extend their shelf life). We have gotten used to the taste of salt in our foods. When you start to limit your salt intake, you will notice the lack of salt. Give yourself time to adjust to the change.  Tips    To keep track of your sodium intake, write down the amount of sodium you eat for a of couple days. This gives you a good idea of which foods are high in sodium--and where you can cut back.    Do not add salt while cooking or at the table. In recipes, you can often use half the amount of salt without giving up flavor.    Do not add salt to water when making rice, pasta or potatoes.    Do not use lemon pepper--this is made with salt.    Use spices and herbs without the word  salt  in their names. Use herb blends like Mrs. Bautista.    When eating vegetables, meats, poultry or fish, choose fresh or frozen instead of canned foods.    Eat more homemade foods that are made from scratch. Avoid boxed rice, noodles or potato dishes--these often contain salty seasonings.    Choose foods with the least amount of packaging. These are often lower in sodium .    Read food labels to learn the sodium content for suggested serving sizes. Choose foods with the least amount of sodium.  ? Choose foods labeled  low sodium.  These  have less than 140 mg of sodium per serving.    Always double-check serving sizes. If you eat two servings of a food, you will get twice as much sodium.    When you go out to eat, ask that foods be made without added salt. Ask for condiments on the side, so you can control how much you use.    You will find foods with less sodium if you shop along the outer walls of the grocery store.    Do not use a salt substitute without your doctor s okay. Some products (like Schreiber Lite Salt) contain potassium. If you are taking certain medicines, these products could raise the level of potassium in your blood.  High-sodium foods    Salt or kosher salt (one teaspoon = 2,300 mg of sodium)    Seasonings (lemon pepper, garlic salt, sea salt, seasoning salt, etc.)    Meat tenderizers and marinades    Packaged sauces or gravies    Snack foods (chips, crackers, pork rinds, salted nuts)    Cheese (natural and processed)    Cottage cheese    Fast-food and restaurant meals    Most canned vegetables and vegetable juices    Pickled and cured foods (pickles, olives, sauerkraut)    Condiments (BBQ sauce, soy sauce, teriyaki sauce, steak sauce, salad dressing, ketchup, etc.)    Canned or boxed side dishes, such as macaroni and cheese, ramen noodles, Hamburger [place] and Rice-A-[place]    Frozen meals with more than 500 mg of sodium per serving    Monosodium glutamate (MSG)    Processed meats (bologna, ham, fry) and canned meats (SPAM, corned beef)    Soups and bouillon (canned, frozen or dried)    Canned tomato products (juice, spaghetti sauce, etc.)    Sports drinks such as Gatorade or Powerade    Foods covered in sauce, gravy or other coatings (broccoli with cheese sauce, chicken fingers, etc.)    Biscuits and refrigerated rolls or breads         Patient Education     Tips for a Low-Sodium Diet  If you have high blood pressure, heart failure, liver problems or kidney problems, you may need to watch your sodium intake. Too much sodium can  cause thirst and shortness of breath. It can also make your body retain extra fluids.  You should limit the amount of sodium in your diet to mg per day.  Sodium is found in many foods. Most of our sodium comes from  processed  foods like canned soups, lunch meats and TV dinners.  A major source of sodium is salt, or sodium chloride. Salt is often used to preserve foods (extend their shelf life). We have gotten used to the taste of salt in our foods. When you start to limit your salt intake, you will notice the lack of salt. Give yourself time to adjust to the change.  Tips    To keep track of your sodium intake, write down the amount of sodium you eat for a of couple days. This gives you a good idea of which foods are high in sodium--and where you can cut back.    Do not add salt while cooking or at the table. In recipes, you can often use half the amount of salt without giving up flavor.    Do not add salt to water when making rice, pasta or potatoes.    Do not use lemon pepper--this is made with salt.    Use spices and herbs without the word  salt  in their names. Use herb blends like Mrs. Bautista.    When eating vegetables, meats, poultry or fish, choose fresh or frozen instead of canned foods.    Eat more homemade foods that are made from scratch. Avoid boxed rice, noodles or potato dishes--these often contain salty seasonings.    Choose foods with the least amount of packaging. These are often lower in sodium .    Read food labels to learn the sodium content for suggested serving sizes. Choose foods with the least amount of sodium.  ? Choose foods labeled  low sodium.  These have less than 140 mg of sodium per serving.    Always double-check serving sizes. If you eat two servings of a food, you will get twice as much sodium.    When you go out to eat, ask that foods be made without added salt. Ask for condiments on the side, so you can control how much you use.    You will find foods with less sodium if you shop  along the outer walls of the grocery store.    Do not use a salt substitute without your doctor s okay. Some products (like Schreiber Lite Salt) contain potassium. If you are taking certain medicines, these products could raise the level of potassium in your blood.  High-sodium foods    Salt or kosher salt (one teaspoon = 2,300 mg of sodium)    Seasonings (lemon pepper, garlic salt, sea salt, seasoning salt, etc.)    Meat tenderizers and marinades    Packaged sauces or gravies    Snack foods (chips, crackers, pork rinds, salted nuts)    Cheese (natural and processed)    Cottage cheese    Fast-food and restaurant meals    Most canned vegetables and vegetable juices    Pickled and cured foods (pickles, olives, sauerkraut)    Condiments (BBQ sauce, soy sauce, teriyaki sauce, steak sauce, salad dressing, ketchup, etc.)    Canned or boxed side dishes, such as macaroni and cheese, ramen noodles, Hamburger [place] and Rice-A-[place]    Frozen meals with more than 500 mg of sodium per serving    Monosodium glutamate (MSG)    Processed meats (bologna, ham, fry) and canned meats (SPAM, corned beef)    Soups and bouillon (canned, frozen or dried)    Canned tomato products (juice, spaghetti sauce, etc.)    Sports drinks such as Gatorade or Powerade    Foods covered in sauce, gravy or other coatings (broccoli with cheese sauce, chicken fingers, etc.)    Biscuits and refrigerated rolls or breads

## 2019-06-18 NOTE — PROGRESS NOTES
Subjective     Sayda Valles is a 33 year old female who presents to clinic today for the following health issues:    HPI       Hospital Follow-up Visit:    Hospital/Nursing Home/IP Rehab Facility: [facility]  Date of Admission: 6/15/19  Date of Discharge: 6/16/19  Reason(s) for Admission: Stroke - current symptoms,  feeling ok, having some anxiety, last night and today felt better, had a dull ache, stiff neck, legs feel tired and achy.  Had a third of a cup of coffee yesterday and then got jittery, had taking the aspirin and Plavix prior to the coffee went away after eating.          Dull headache right occipital region.  Has been taking Tylenol twice daily.  Headache has resolved today.  Neck is feeling tight.  Feels like she is over compensating and not moving her neck.      Was diagnosed with stroke, left vertebral artery dissection, migraines and patent foramen ovale.    Is on Aspirin and Clopidogrel, 75 mg daily.      Decreased caffeine intake.  Was jittery after drinking a partial cup of coffee.    Slept better last night.  Had anxiety the first night after hospital discharge.    History of chronic anxiety - has anxiety about taking anti-anxiety medication.    Taking a medication makes her more nervous.      Was thinking about trying Melatonin, but worried about interaction.         Problems taking medications regularly:  None       Medication changes since discharge: Aspirin 81 mg and Plavix 75 mg were added       Problems adhering to non-medication therapy:  None    Summary of hospitalization:  [facility] discharge summary reviewed  Diagnostic Tests/Treatments reviewed.  Follow up needed: Neurology follow-up in 4 weeks.   Other Healthcare Providers Involved in Patient s Care:         None  Update since discharge: stable, slightly improved    Post Discharge Medication Reconciliation: discharge medications reconciled, continue medications without change.  Plan of care communicated with  patient and mother     Coding guidelines for this visit:  Type of Medical   Decision Making Face-to-Face Visit       within 7 Days of discharge Face-to-Face Visit        within 14 days of discharge   Moderate Complexity 28078 [id number]   High Complexity 11019 16070          +Urinary frequency.  Has increased water intake.  +Malodorous urine.  Would like urine rechecked.  Was checked while in hospital.    '    Social:   for [employer].    Drives a lot for her work, screen time.      Patient Active Problem List   Diagnosis     Allergic rhinitis     Varicose Veins of Legs- painful with standing     Abnormal Mammogram- 2006 - prob benign      Heart palpitations - since  intermittent. Reports multiple work-ups with unclear cause. Followed by cardiology.     CARDIOVASCULAR SCREENING; LDL GOAL LESS THAN 160     Anxiety     Migraine with aura and without status migrainosus, not intractable - since ; stable.     Mild persistent asthma without complication     Bilateral ankle pain     Family history of breast cancer - paternal grandma, paternal aunt and paternal great-grandmother. Dad completed genetic screening which was neg.      Class 2 obesity due to excess calories without serious comorbidity with body mass index (BMI) of 37.0 to 37.9 in adult     Segmental dysfunction of cervical region     Segmental dysfunction of thoracic region     Segmental dysfunction of lumbar region     Segmental dysfunction of sacral region     Mechanical back pain     Stroke (H)     Past Surgical History:   Procedure Laterality Date     NO HISTORY OF SURGERY         Social History     Tobacco Use     Smoking status: Former Smoker     Packs/day: 0.50     Years: 2.00     Pack years: 1.00     Types: Cigarettes     Start date:      Last attempt to quit: 2001     Years since quittin.4     Smokeless tobacco: Never Used   Substance Use Topics     Alcohol use: Yes     Comment: 0-0.5 glasses of wine per month     Family  History   Problem Relation Age of Onset     Lipids Father      Neurologic Disorder Father         epilepsy     Heart Disease Father 47        MI     Hypertension Father      Hyperlipidemia Father      Lipids Sister      Hyperlipidemia Sister      Breast Cancer Paternal Grandmother         3rd generation      Breast Cancer Paternal Aunt      Diabetes Mother         gestational      Asthma Mother      Diabetes Paternal Grandfather      Other Cancer Maternal Grandfather         Skin     Hyperlipidemia Paternal Half-Sister      Breast Cancer Other      Other Cancer Maternal Grandmother         Lung     Ovarian Cancer Maternal Grandmother      Lung Cancer Maternal Grandmother      Diabetes Maternal Uncle      Diabetes Maternal Uncle      Colon Cancer No family hx of          Current Outpatient Medications   Medication Sig Dispense Refill     hydrOXYzine (ATARAX) 25 MG tablet Take 1 tablet (25 mg) by mouth nightly as needed for anxiety or other (insomnia) 30 tablet 1     acetaminophen (TYLENOL) 500 MG tablet Take 1,000 mg by mouth 2 times daily       albuterol (PROAIR HFA/PROVENTIL HFA/VENTOLIN HFA) 108 (90 BASE) MCG/ACT Inhaler Inhale 1-2 puffs into the lungs every 4 hours as needed for shortness of breath / dyspnea or wheezing 1 Inhaler 1     aspirin (ASA) 81 MG EC tablet Take 1 tablet (81 mg) by mouth daily       Camphor-Menthol 0.2-3.5 % LIQD Externally apply 1 spray topically       clopidogrel (PLAVIX) 75 MG tablet Take 1 tablet (75 mg) by mouth daily 30 tablet 0     loratadine (CLARITIN) 10 MG tablet Take 10 mg by mouth daily       Multiple Vitamins-Minerals (MULTIVITAMIN GUMMIES WOMENS) CHEW Smarty Pants Women's Complete       Allergies   Allergen Reactions     Zithromax [Azithromycin Dihydrate] GI Disturbance       Reviewed and updated as needed this visit by Provider         Review of Systems   ROS COMP: Constitutional, HEENT, cardiovascular, pulmonary, gi and gu systems are negative, except as otherwise noted.    "   Objective    BP (!) 124/92 (BP Location: Right arm, Patient Position: Sitting, Cuff Size: Adult Large)   Pulse 107   Temp 98.2  F (36.8  C) (Oral)   Ht 1.651 m (5' 5\")   Wt 98 kg (216 lb)   SpO2 99%   BMI 35.94 kg/m    Body mass index is 35.94 kg/m .  Physical Exam   GENERAL: healthy, alert and no distress  EYES: Eyes grossly normal to inspection, PERRL and conjunctivae and sclerae normal  NECK: bilateral trapezius and SCM tenderness to palpation  RESP: lungs clear to auscultation - no rales, rhonchi or wheezes  CV: regular rate and rhythm, normal S1 S2, no S3 or S4, no murmur, click or rub, no peripheral edema  NEURO: Normal strength and tone, mentation intact and speech normal  PSYCH: mentation appears normal, affect normal/bright        Assessment & Plan     Sayda was seen today for er f/u.    Diagnoses and all orders for this visit:    Hospital discharge follow-up    Cerebrovascular accident (CVA), unspecified mechanism (H)  Dissecting hemorrhage of left vertebral artery (H)  PFO (patent foramen ovale)  Aspirin, 81 mg daily and Clopidogrel, 75 mg daily.    No statin therapy per neurology.    Plan follow-up with neurology in 4 weeks.      Urinary frequency  UA not suggestive of infection, reassurance regarding results.    Results for orders placed or performed in visit on 06/18/19   *UA reflex to Microscopic and Culture ([place] [facility] (except [place] and [place])   Result Value Ref Range    Color Urine Yellow     Appearance Urine Clear     Glucose Urine Negative NEG^Negative mg/dL    Bilirubin Urine Negative NEG^Negative    Ketones Urine Trace (A) NEG^Negative mg/dL    Specific Gravity Urine <=1.005 1.003 - 1.035    Blood Urine Negative NEG^Negative    pH Urine 5.5 5.0 - 7.0 pH    Protein Albumin Urine Negative NEG^Negative mg/dL    Urobilinogen Urine 0.2 0.2 - 1.0 EU/dL    Nitrite Urine Negative NEG^Negative    Leukocyte Esterase Urine Negative NEG^Negative    Source Midstream Urine  "       -     *UA reflex to Microscopic and Culture ([place] [facility] (except [place] and [place])    Anxiety  Increased anxiety related to recent hospitalization causing insomnia.   Encouraged trial of Hydroxyzine to aid with sleep.   Continue Melatonin as needed.    THELMA-7 SCORE 12/11/2012 5/30/2014 6/18/2019   Total Score 1 0 -   Total Score - - 6     PHQ-9 SCORE 12/10/2012 5/30/2014 6/18/2019   PHQ-9 Total Score 1 0 -   PHQ-9 Total Score - - 4     -     hydrOXYzine (ATARAX) 25 MG tablet; Take 1 tablet (25 mg) by mouth nightly as needed for anxiety or other (insomnia)    Encounter for completion of form with patient  FMLA form completed and given to TC to be faxed.        Return in about 5 weeks (around 7/23/2019) for after neurology consultation.    CHE Rueda [facility] [place]

## 2019-06-19 ENCOUNTER — ALLIED HEALTH/NURSE VISIT (OUTPATIENT)
Dept: PHARMACY | Facility: CLINIC | Age: 34
End: 2019-06-19
Payer: COMMERCIAL

## 2019-06-19 DIAGNOSIS — J45.30 MILD PERSISTENT ASTHMA WITHOUT COMPLICATION: ICD-10-CM

## 2019-06-19 DIAGNOSIS — G43.109 MIGRAINE WITH AURA AND WITHOUT STATUS MIGRAINOSUS, NOT INTRACTABLE: ICD-10-CM

## 2019-06-19 DIAGNOSIS — I63.9 CEREBROVASCULAR ACCIDENT (CVA), UNSPECIFIED MECHANISM (H): Primary | ICD-10-CM

## 2019-06-19 DIAGNOSIS — Z78.9 TAKES DIETARY SUPPLEMENTS: ICD-10-CM

## 2019-06-19 DIAGNOSIS — F41.9 ANXIETY: ICD-10-CM

## 2019-06-19 PROCEDURE — 99605 MTMS BY PHARM NP 15 MIN: CPT | Performed by: PHARMACIST

## 2019-06-19 PROCEDURE — 99607 MTMS BY PHARM ADDL 15 MIN: CPT | Performed by: PHARMACIST

## 2019-06-19 RX ORDER — CALCIUM CITRATE/VITAMIN D3 200MG-6.25
TABLET ORAL
COMMUNITY

## 2019-06-19 ASSESSMENT — ANXIETY QUESTIONNAIRES: GAD7 TOTAL SCORE: 6

## 2019-06-19 NOTE — PROGRESS NOTES
SUBJECTIVE/OBJECTIVE:                Sayda Valles is a 33 year old female called for a transitions of care visit.  She was discharged from [facility] on 6/16/19 for stroke/left vertebral artery dissection.     Chief Complaint: Hospital Follow-Up.    Allergies/ADRs: Reviewed in Epic  Tobacco: History of tobacco dependence - quit 2001   Alcohol: none  Caffeine: no caffeine since hospital  Activity: has not been able to exercise - just bought a home/is unpacking  PMH: Reviewed in Epic    Medication Adherence/Access:  Patient takes medications directly from bottles, but plans to start using pillbox  Patient takes medications 1 time(s) per day.   Per patient, misses medication 0 times per week.   Medication barriers: none.   The patient fills medications at [place]: NO, fills medications at [facility] in [place].    Stroke/Left vertebral artery dissection/PFO: Currently taking aspirin 81 mg daily and clopidogrel 75 mg daily. Pt has follow-up with Dr. Glass in Brain/Spine clinic planned.  Pt reports no current issues with bleeding or bruising.     Asthma: Pt is taking loratadine 10 mg daily. Since she started this antihistamine last year her symptoms have been significantly improved.  She rarely uses albuterol inhaler unless exercising vigorously. Denies any issues.    Anxiety: Currently prescribed hydroxyzine PRN, but was on backorder at [facility]. Pt states her anxiety has been better so she does not know if she would use.  She is considering melatonin at bedtime. Pt reports no current issues.     Headache: Currently taking acetaminophen 1000 mg twice daily. Pt finds this to be effective - headaches have been less since home. Pt reports no issues. She has been keeping her neck stiff. She does use some topical pain reliever a few times a day as well.     Supplements: Pt is taking a Women's Complete Smarty Pants vitamin gummy serving daily. Denies any issues.     Today's Vitals: There were no vitals taken for this  visit. - telephone encounter, no vitals    ASSESSMENT:                 Current medications were reviewed today.      Medication Adherence: good, no issues identified    Stroke/Left vertebral artery dissection/PFO: Improved. Follow-up plan in place.     Asthma: Stable.    Anxiety: Stable.     Headache: Stable.     Supplements: Stable.    PLAN:                  Post Discharge Medication Reconciliation Status: discharge medications reconciled, continue medications without change.    1. Continue current therapy    I spent 20 minutes with this patient today. A copy of the visit note was provided to the patient's primary care provider.    Will follow up in 2-4 weeks if needed.    The patient was sent via My1login a summary of these recommendations as an after visit summary.    Juan Alberto Castañeda, AlfD, BCACP  Medication Therapy Management Pharmacist  Pager: [phone]

## 2019-06-21 ENCOUNTER — TELEPHONE (OUTPATIENT)
Dept: CARDIOLOGY | Facility: CLINIC | Age: 34
End: 2019-06-21

## 2019-06-24 ENCOUNTER — TELEPHONE (OUTPATIENT)
Dept: FAMILY MEDICINE | Facility: CLINIC | Age: 34
End: 2019-06-24

## 2019-06-24 DIAGNOSIS — I77.74 DISSECTING HEMORRHAGE OF LEFT VERTEBRAL ARTERY (H): Primary | ICD-10-CM

## 2019-06-24 DIAGNOSIS — Z86.73 H/O: CVA (CEREBROVASCULAR ACCIDENT): ICD-10-CM

## 2019-06-24 PROBLEM — Q21.12 PFO (PATENT FORAMEN OVALE): Status: ACTIVE | Noted: 2019-06-24

## 2019-06-25 ENCOUNTER — TELEPHONE (OUTPATIENT)
Dept: FAMILY MEDICINE | Facility: CLINIC | Age: 34
End: 2019-06-25

## 2019-06-25 NOTE — TELEPHONE ENCOUNTER
Called pt.  She asked we add the cardiologist she is scheduled with to her paperwork and re-fax it.  This was done.  Copy mailed to patient and sent to scanning.  Iram Anna

## 2019-06-25 NOTE — TELEPHONE ENCOUNTER
Reason for Call:  Other     Detailed comments: Malina is calling with a couple of questions regarding her FMLA paperwork signed by Brittany Miles PA-C. She would like a call back.    Phone Number Patient can be reached at: Cell number on file:    Telephone Information:   Mobile [phone]     Best Time: Anytime    Can we leave a detailed message on this number? Not Applicable    Call taken on 6/25/2019 at 11:25 AM by Miroslava Vázquez

## 2019-07-12 ENCOUNTER — OFFICE VISIT (OUTPATIENT)
Dept: NEUROSURGERY | Facility: CLINIC | Age: 34
End: 2019-07-12
Attending: PSYCHIATRY & NEUROLOGY
Payer: COMMERCIAL

## 2019-07-12 ENCOUNTER — HOSPITAL ENCOUNTER (OUTPATIENT)
Dept: CT IMAGING | Facility: CLINIC | Age: 34
Discharge: HOME | End: 2019-07-12
Attending: PSYCHIATRY & NEUROLOGY | Admitting: PSYCHIATRY & NEUROLOGY
Payer: COMMERCIAL

## 2019-07-12 VITALS
WEIGHT: 222 LBS | HEART RATE: 109 BPM | DIASTOLIC BLOOD PRESSURE: 87 MMHG | TEMPERATURE: 97.9 F | OXYGEN SATURATION: 97 % | HEIGHT: 65 IN | SYSTOLIC BLOOD PRESSURE: 138 MMHG | BODY MASS INDEX: 36.99 KG/M2

## 2019-07-12 DIAGNOSIS — Z86.73 H/O: CVA (CEREBROVASCULAR ACCIDENT): ICD-10-CM

## 2019-07-12 DIAGNOSIS — I67.1 CEREBRAL ANEURYSM, NONRUPTURED: Primary | ICD-10-CM

## 2019-07-12 DIAGNOSIS — I63.9 CEREBROVASCULAR ACCIDENT (CVA), UNSPECIFIED MECHANISM (H): ICD-10-CM

## 2019-07-12 DIAGNOSIS — I77.74 DISSECTING HEMORRHAGE OF LEFT VERTEBRAL ARTERY (H): ICD-10-CM

## 2019-07-12 LAB — RADIOLOGIST FLAGS: ABNORMAL

## 2019-07-12 PROCEDURE — 99214 OFFICE O/P EST MOD 30 MIN: CPT | Performed by: PSYCHIATRY & NEUROLOGY

## 2019-07-12 PROCEDURE — 70498 CT ANGIOGRAPHY NECK: CPT

## 2019-07-12 PROCEDURE — G0463 HOSPITAL OUTPT CLINIC VISIT: HCPCS | Mod: 25

## 2019-07-12 PROCEDURE — 25000128 H RX IP 250 OP 636: Performed by: PSYCHIATRY & NEUROLOGY

## 2019-07-12 PROCEDURE — 25000125 ZZHC RX 250: Performed by: PSYCHIATRY & NEUROLOGY

## 2019-07-12 RX ORDER — CLOPIDOGREL BISULFATE 75 MG/1
75 TABLET ORAL DAILY
Qty: 90 TABLET | Refills: 3 | Status: SHIPPED | OUTPATIENT
Start: 2019-07-12 | End: 2020-01-14

## 2019-07-12 RX ORDER — IOPAMIDOL 755 MG/ML
70 INJECTION, SOLUTION INTRAVASCULAR ONCE
Status: COMPLETED | OUTPATIENT
Start: 2019-07-12 | End: 2019-07-12

## 2019-07-12 RX ADMIN — SODIUM CHLORIDE 100 ML: 9 INJECTION, SOLUTION INTRAVENOUS at 08:38

## 2019-07-12 RX ADMIN — IOPAMIDOL 70 ML: 755 INJECTION, SOLUTION INTRAVENOUS at 08:38

## 2019-07-12 ASSESSMENT — MIFFLIN-ST. JEOR: SCORE: 1707.87

## 2019-07-12 NOTE — PROGRESS NOTES
Dear Dr. Miles,    It was a pleasure seeing Ms. Valles in our vascular neurology clinic today. 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 She initially had dizziness during softball practice. He had return of the symptoms along with neck pain. No trauma. She is doing well at this time.    Imaging and laboratory personally reviewed.    ROS: 10 point ROS neg other than the symptoms noted above in the HPI.    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 Provider to review and confirm.     Family history of malignant neoplasm of breast     4 generations on her father's side     FAMILY HX BREAST MALIG      Heart palpitations 2/10    PVC's - dr sandhu     Migraine without aura, with intractable migraine, so stated, without mention of status migrainosus     sees neurologist     Mild persistent asthma      Non morbid obesity due to excess calories 5/9/2016     Simple goiter 11/25/2008    pt has no enlargement and had normal blood work-up at that time     Stroke (H) 06/15/2019    due to L vert artery dissection after chiropractor manipulation       Past surgical history:   Past Surgical History:   Procedure Laterality Date     NO HISTORY OF SURGERY         Family history:  Family History   Problem Relation Age of Onset     Lipids Father      Neurologic Disorder Father         epilepsy     Heart Disease Father 47        MI     Hypertension Father      Hyperlipidemia Father      Lipids Sister      Hyperlipidemia Sister      Breast Cancer Paternal Grandmother         3rd generation      Breast Cancer Paternal Aunt       Diabetes Mother         gestational      Asthma Mother      Diabetes Paternal Grandfather      Other Cancer Maternal Grandfather         Skin     Hyperlipidemia Paternal Half-Sister      Breast Cancer Other      Other Cancer Maternal Grandmother         Lung     Ovarian Cancer Maternal Grandmother      Lung Cancer Maternal Grandmother      Diabetes Maternal Uncle      Diabetes Maternal Uncle      Colon Cancer No family hx of    Great grandfather - cerebral aneurysm    Social history:  Social History     Socioeconomic History     Marital status: Single     Spouse name: none     Number of children: Not on file     Years of education: Not on file     Highest education level: Not on file   Occupational History     Occupation:  at [employer]      Employer: OTHER   Social Needs     Financial resource strain: Not on file     Food insecurity:     Worry: Not on file     Inability: Not on file     Transportation needs:     Medical: Not on file     Non-medical: Not on file   Tobacco Use     Smoking status: Former Smoker     Packs/day: 0.50     Years: 2.00     Pack years: 1.00     Types: Cigarettes     Start date:      Last attempt to quit: 2001     Years since quittin.5     Smokeless tobacco: Never Used   Substance and Sexual Activity     Alcohol use: Yes     Comment: 0-0.5 glasses of wine per month     Drug use: No     Sexual activity: Not Currently     Partners: Male     Birth control/protection: Condom   Lifestyle     Physical activity:     Days per week: Not on file     Minutes per session: Not on file     Stress: Not on file   Relationships     Social connections:     Talks on phone: Not on file     Gets together: Not on file     Attends Bahai service: Not on file     Active member of club or organization: Not on file     Attends meetings of clubs or organizations: Not on file     Relationship status: Not on file     Intimate partner violence:     Fear of current or ex partner: Not  "on file     Emotionally abused: Not on file     Physically abused: Not on file     Forced sexual activity: Not on file   Other Topics Concern      Service No     Blood Transfusions No     Caffeine Concern No     Occupational Exposure No     Hobby Hazards No     Sleep Concern No     Stress Concern No     Weight Concern Yes     Comment: Would like to lose more weight     Special Diet No     Back Care No     Exercise Yes     Comment: Moderate     Bike Helmet No     Seat Belt Yes     Comment: always      Self-Exams Yes     Comment: SBE encouraged monthly      Parent/sibling w/ CABG, MI or angioplasty before 65F 55M? Yes   Social History Narrative    Calcium - 2-3 dairy servings/ day     Menarche: at age 12    Menses: regular        Medications:  Current Outpatient Medications   Medication     acetaminophen (TYLENOL) 500 MG tablet     albuterol (PROAIR HFA/PROVENTIL HFA/VENTOLIN HFA) 108 (90 BASE) MCG/ACT Inhaler     aspirin (ASA) 81 MG EC tablet     Camphor-Menthol 0.2-3.5 % LIQD     clopidogrel (PLAVIX) 75 MG tablet     hydrOXYzine (ATARAX) 25 MG tablet     loratadine (CLARITIN) 10 MG tablet     metoprolol succinate ER (TOPROL-XL) 25 MG 24 hr tablet     Multiple Vitamins-Minerals (MULTIVITAMIN GUMMIES WOMENS) CHEW     No current facility-administered medications for this visit.        Physical examination:    Constitutional:  /87 (BP Location: Right arm, Patient Position: Sitting, Cuff Size: Adult Large)   Pulse 109   Temp 97.9  F (36.6  C) (Oral)   Ht 1.651 m (5' 5\")   Wt 100.7 kg (222 lb)   SpO2 97%   BMI 36.94 kg/m    General: Good mood  Integumentary: Warm, intact  HEENT: No carotid bruits  Lungs: Clear to auscultation bilaterally  CVS: +S1S2 no murmurs  Abd: Non-distended, non-tender  Neuro:  Awake, alert, oriented x 3, follows all commands, fluent speech, no neglect  PERRL, EOMI, no nystagmus, VF full, no facial drooping, no slurring of speech  Motor: Moves all ext well, no abnormal " movements  Sensory: No sensory deficit throughout to gross tough  FTN is normal  Gait is normal, tandem walk is normal    mRS - 0, NIHSS - 0    Impression and Plan:    Ms. Valles is doing well. She should continue on Aspirin and Plavix for the time being. We will obtain further imaging of the vertebral artery as well as the small left carotid cave/SH aneurysm with cerebral angiogram.    Thank you for allowing us to participate in Ms. Valles's case.    Momo Glass MD  Vascular/Interventional Neurology

## 2019-07-12 NOTE — NURSING NOTE
"Sayda Valles is a 33 year old female who presents for:  Chief Complaint   Patient presents with     Follow Up     Acute ischemic stroke/cerebral aneurysm.         Initial Vitals:  /87 (BP Location: Right arm, Patient Position: Sitting, Cuff Size: Adult Large)   Pulse 109   Temp 97.9  F (36.6  C) (Oral)   Ht 5' 5\" (1.651 m)   Wt 222 lb (100.7 kg)   SpO2 97%   BMI 36.94 kg/m   Estimated body mass index is 36.94 kg/m  as calculated from the following:    Height as of this encounter: 5' 5\" (1.651 m).    Weight as of this encounter: 222 lb (100.7 kg).. Body surface area is 2.15 meters squared. BP completed using cuff size: large  Data Unavailable        Nursing Comments: Patient here for Ischemic stroke/cerebral aneurysm.         Judith Jerome    "

## 2019-07-12 NOTE — LETTER
7/12/2019         RE: Sayda Valles  [address] [place] [id number]        Dear Colleague,    Thank you for referring your patient, Sayda Valles, to the [facility]UR[facility]. Please see a copy of my visit note below.    Dear Dr. Miles,    It was a pleasure seeing Ms. Valles in our vascular neurology clinic today. 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 She initially had dizziness during softball practice. He had return of the symptoms along with neck pain. No trauma. She is doing well at this time.    Imaging and laboratory personally reviewed.    ROS: 10 point ROS neg other than the symptoms noted above in the HPI.    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 Provider to review and confirm.     Family history of malignant neoplasm of breast     4 generations on her father's side     FAMILY HX BREAST MALIG      Heart palpitations 2/10    PVC's - dr sandhu     Migraine without aura, with intractable migraine, so stated, without mention of status migrainosus     sees neurologist     Mild persistent asthma      Non morbid obesity due to excess calories 5/9/2016     Simple goiter 11/25/2008    pt has no enlargement and had normal blood work-up at that time     Stroke (H) 06/15/2019    due to L vert artery dissection after chiropractor manipulation       Past surgical history:   Past Surgical History:   Procedure Laterality Date     NO HISTORY OF SURGERY         Family history:  Family History   Problem Relation Age of Onset     Lipids Father      Neurologic Disorder Father          epilepsy     Heart Disease Father 47        MI     Hypertension Father      Hyperlipidemia Father      Lipids Sister      Hyperlipidemia Sister      Breast Cancer Paternal Grandmother         3rd generation      Breast Cancer Paternal Aunt      Diabetes Mother         gestational      Asthma Mother      Diabetes Paternal Grandfather      Other Cancer Maternal Grandfather         Skin     Hyperlipidemia Paternal Half-Sister      Breast Cancer Other      Other Cancer Maternal Grandmother         Lung     Ovarian Cancer Maternal Grandmother      Lung Cancer Maternal Grandmother      Diabetes Maternal Uncle      Diabetes Maternal Uncle      Colon Cancer No family hx of    Great grandfather - cerebral aneurysm    Social history:  Social History     Socioeconomic History     Marital status: Single     Spouse name: none     Number of children: Not on file     Years of education: Not on file     Highest education level: Not on file   Occupational History     Occupation:  at [employer]      Employer: OTHER   Social Needs     Financial resource strain: Not on file     Food insecurity:     Worry: Not on file     Inability: Not on file     Transportation needs:     Medical: Not on file     Non-medical: Not on file   Tobacco Use     Smoking status: Former Smoker     Packs/day: 0.50     Years: 2.00     Pack years: 1.00     Types: Cigarettes     Start date:      Last attempt to quit: 2001     Years since quittin.5     Smokeless tobacco: Never Used   Substance and Sexual Activity     Alcohol use: Yes     Comment: 0-0.5 glasses of wine per month     Drug use: No     Sexual activity: Not Currently     Partners: Male     Birth control/protection: Condom   Lifestyle     Physical activity:     Days per week: Not on file     Minutes per session: Not on file     Stress: Not on file   Relationships     Social connections:     Talks on phone: Not on file     Gets together: Not on file     Attends [employer]  "service: Not on file     Active member of club or organization: Not on file     Attends meetings of clubs or organizations: Not on file     Relationship status: Not on file     Intimate partner violence:     Fear of current or ex partner: Not on file     Emotionally abused: Not on file     Physically abused: Not on file     Forced sexual activity: Not on file   Other Topics Concern      Service No     Blood Transfusions No     Caffeine Concern No     Occupational Exposure No     Hobby Hazards No     Sleep Concern No     Stress Concern No     Weight Concern Yes     Comment: Would like to lose more weight     Special Diet No     Back Care No     Exercise Yes     Comment: Moderate     Bike Helmet No     Seat Belt Yes     Comment: always      Self-Exams Yes     Comment: SBE encouraged monthly      Parent/sibling w/ CABG, MI or angioplasty before 65F 55M? Yes   Social History Narrative    Calcium - 2-3 dairy servings/ day     Menarche: at age 12    Menses: regular        Medications:  Current Outpatient Medications   Medication     acetaminophen (TYLENOL) 500 MG tablet     albuterol (PROAIR HFA/PROVENTIL HFA/VENTOLIN HFA) 108 (90 BASE) MCG/ACT Inhaler     aspirin (ASA) 81 MG EC tablet     Camphor-Menthol 0.2-3.5 % LIQD     clopidogrel (PLAVIX) 75 MG tablet     hydrOXYzine (ATARAX) 25 MG tablet     loratadine (CLARITIN) 10 MG tablet     metoprolol succinate ER (TOPROL-XL) 25 MG 24 hr tablet     Multiple Vitamins-Minerals (MULTIVITAMIN GUMMIES WOMENS) CHEW     No current facility-administered medications for this visit.        Physical examination:    Constitutional:  /87 (BP Location: Right arm, Patient Position: Sitting, Cuff Size: Adult Large)   Pulse 109   Temp 97.9  F (36.6  C) (Oral)   Ht 1.651 m (5' 5\")   Wt 100.7 kg (222 lb)   SpO2 97%   BMI 36.94 kg/m     General: Good mood  Integumentary: Warm, intact  HEENT: No carotid bruits  Lungs: Clear to auscultation bilaterally  CVS: +S1S2 no " murmurs  Abd: Non-distended, non-tender  Neuro:  Awake, alert, oriented x 3, follows all commands, fluent speech, no neglect  PERRL, EOMI, no nystagmus, VF full, no facial drooping, no slurring of speech  Motor: Moves all ext well, no abnormal movements  Sensory: No sensory deficit throughout to gross tough  FTN is normal  Gait is normal, tandem walk is normal    mRS - 0, NIHSS - 0    Impression and Plan:    Ms. Valles is doing well. She should continue on Aspirin and Plavix for the time being. We will obtain further imaging of the vertebral artery as well as the small left carotid cave/SH aneurysm with cerebral angiogram.    Thank you for allowing us to participate in Ms. Valles's case.    Momo Glass MD  Vascular/Interventional Neurology          Again, thank you for allowing me to participate in the care of your patient.        Sincerely,        Momo Glass MD

## 2019-07-19 ENCOUNTER — OFFICE VISIT (OUTPATIENT)
Dept: CARDIOLOGY | Facility: CLINIC | Age: 34
End: 2019-07-19
Payer: COMMERCIAL

## 2019-07-19 VITALS
BODY MASS INDEX: 36.65 KG/M2 | HEIGHT: 65 IN | WEIGHT: 220 LBS | SYSTOLIC BLOOD PRESSURE: 141 MMHG | DIASTOLIC BLOOD PRESSURE: 86 MMHG | HEART RATE: 82 BPM

## 2019-07-19 DIAGNOSIS — I10 BENIGN ESSENTIAL HYPERTENSION: Primary | ICD-10-CM

## 2019-07-19 PROCEDURE — 99213 OFFICE O/P EST LOW 20 MIN: CPT | Performed by: INTERNAL MEDICINE

## 2019-07-19 RX ORDER — METOPROLOL SUCCINATE 50 MG/1
50 TABLET, EXTENDED RELEASE ORAL DAILY
Qty: 30 TABLET | Refills: 1 | Status: SHIPPED | OUTPATIENT
Start: 2019-07-19 | End: 2019-07-22

## 2019-07-19 ASSESSMENT — MIFFLIN-ST. JEOR: SCORE: 1698.79

## 2019-07-19 NOTE — LETTER
7/19/2019    Brittany Miles, APRN CNP  [address] [place] [address]    RE: Sayda Valles       Dear Colleague,    I had the pleasure of seeing Sayda Valles in the [facility].    HPI and Plan:   See dictation    No orders of the defined types were placed in this encounter.    Orders Placed This Encounter   Medications     metoprolol succinate ER (TOPROL-XL) 50 MG 24 hr tablet     Sig: Take 1 tablet (50 mg) by mouth daily     Dispense:  30 tablet     Refill:  1     There are no discontinued medications.      Encounter Diagnosis   Name Primary?     Benign essential hypertension Yes       CURRENT MEDICATIONS:  Current Outpatient Medications   Medication Sig Dispense Refill     acetaminophen (TYLENOL) 500 MG tablet Take 1,000 mg by mouth 2 times daily as needed        albuterol (PROAIR HFA/PROVENTIL HFA/VENTOLIN HFA) 108 (90 BASE) MCG/ACT Inhaler Inhale 1-2 puffs into the lungs every 4 hours as needed for shortness of breath / dyspnea or wheezing 1 Inhaler 1     aspirin (ASA) 81 MG EC tablet Take 1 tablet (81 mg) by mouth daily       Camphor-Menthol 0.2-3.5 % LIQD Externally apply 1 spray topically       clopidogrel (PLAVIX) 75 MG tablet Take 1 tablet (75 mg) by mouth daily 90 tablet 3     hydrOXYzine (ATARAX) 25 MG tablet Take 1 tablet (25 mg) by mouth nightly as needed for anxiety or other (insomnia) 30 tablet 1     loratadine (CLARITIN) 10 MG tablet Take 10 mg by mouth daily       metoprolol succinate ER (TOPROL-XL) 50 MG 24 hr tablet Take 1 tablet (50 mg) by mouth daily 30 tablet 1     Multiple Vitamins-Minerals (MULTIVITAMIN GUMMIES WOMENS) CHEW Smarty Pants Women's Complete         ALLERGIES     Allergies   Allergen Reactions     Zithromax [Azithromycin Dihydrate] GI Disturbance       PAST MEDICAL HISTORY:  Past Medical History:   Diagnosis Date     Allergic rhinitis, cause unspecified     Allergic rhinitis     Aneurysm (H)     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 Provider to review and confirm.     Family history of malignant neoplasm of breast     4 generations on her father's side     FAMILY HX BREAST MALIG      Heart palpitations 2/10    PVC's - dr sandhu     Migraine without aura, with intractable migraine, so stated, without mention of status migrainosus     sees neurologist     Mild persistent asthma      Non morbid obesity due to excess calories 5/9/2016     Simple goiter 11/25/2008    pt has no enlargement and had normal blood work-up at that time     Stroke (H) 06/15/2019    due to L vert artery dissection after chiropractor manipulation       PAST SURGICAL HISTORY:  Past Surgical History:   Procedure Laterality Date     NO HISTORY OF SURGERY         FAMILY HISTORY:  Family History   Problem Relation Age of Onset     Lipids Father      Neurologic Disorder Father         epilepsy     Heart Disease Father 47        MI     Hypertension Father      Hyperlipidemia Father      Lipids Sister      Hyperlipidemia Sister      Breast Cancer Paternal Grandmother         3rd generation      Breast Cancer Paternal Aunt      Diabetes Mother         gestational      Asthma Mother      Diabetes Paternal Grandfather      Other Cancer Maternal Grandfather         Skin     Hyperlipidemia Paternal Half-Sister      Breast Cancer Other      Other Cancer Maternal Grandmother         Lung     Ovarian Cancer Maternal Grandmother      Lung Cancer Maternal Grandmother      Diabetes Maternal Uncle      Diabetes Maternal Uncle      Colon Cancer No family hx of        SOCIAL HISTORY:  Social History     Socioeconomic History     Marital status: Single     Spouse name: none     Number of children: None     Years of education: None     Highest education level: None   Occupational History     Occupation:  at [employer]       Employer: OTHER   Social Needs     Financial resource strain: None     Food insecurity:     Worry: None     Inability: None     Transportation needs:     Medical: None     Non-medical: None   Tobacco Use     Smoking status: Former Smoker     Packs/day: 0.50     Years: 2.00     Pack years: 1.00     Types: Cigarettes     Start date:      Last attempt to quit: 2001     Years since quittin.4     Smokeless tobacco: Never Used   Substance and Sexual Activity     Alcohol use: Yes     Comment: 0-0.5 glasses of wine per month     Drug use: No     Sexual activity: Not Currently     Partners: Male     Birth control/protection: Condom   Lifestyle     Physical activity:     Days per week: None     Minutes per session: None     Stress: None   Relationships     Social connections:     Talks on phone: None     Gets together: None     Attends Buddhism service: None     Active member of club or organization: None     Attends meetings of clubs or organizations: None     Relationship status: None     Intimate partner violence:     Fear of current or ex partner: None     Emotionally abused: None     Physically abused: None     Forced sexual activity: None   Other Topics Concern      Service No     Blood Transfusions No     Caffeine Concern No     Occupational Exposure No     Hobby Hazards No     Sleep Concern No     Stress Concern No     Weight Concern Yes     Comment: Would like to lose more weight     Special Diet No     Back Care No     Exercise Yes     Comment: Moderate     Bike Helmet No     Seat Belt Yes     Comment: always      Self-Exams Yes     Comment: SBE encouraged monthly      Parent/sibling w/ CABG, MI or angioplasty before 65F 55M? Yes   Social History Narrative    Calcium - 2-3 dairy servings/ day     Menarche: at age 12    Menses: regular        Review of Systems:  Skin:  Negative     Eyes:  Positive for glasses  ENT:  Negative    Respiratory:  Positive for dyspnea on exertion  Cardiovascular:   "Negative for;palpitations Positive for;edema;fatigue;dizziness  Gastroenterology: Positive for heartburn  Genitourinary:  Negative    Musculoskeletal:  Positive for joint pain;back pain  Neurologic:  Positive for numbness or tingling of hands  Psychiatric:  Positive for anxiety  Heme/Lymph/Imm:  Positive for allergies  Endocrine:  Negative      Physical Exam:  Vitals: /86   Pulse 82   Ht 1.651 m (5' 5\")   Wt 99.8 kg (220 lb)   BMI 36.61 kg/m       Constitutional:           Skin:             Head:           Eyes:           Lymph:      ENT:           Neck:           Respiratory:            Cardiac:                                                           GI:           Extremities and Muscular Skeletal:                 Neurological:           Psych:         Recent Lab Results:  LIPID RESULTS:  Lab Results   Component Value Date    CHOL 150 06/16/2019    HDL 44 (L) 06/16/2019    LDL 92 06/16/2019    TRIG 72 06/16/2019    CHOLHDLRATIO 3.8 07/21/2014       LIVER ENZYME RESULTS:  Lab Results   Component Value Date    AST 19 09/19/2018    ALT 23 09/19/2018       CBC RESULTS:  Lab Results   Component Value Date    WBC 7.7 06/16/2019    RBC 4.82 06/16/2019    HGB 12.0 06/16/2019    HCT 38.8 06/16/2019    MCV 81 06/16/2019    MCH 24.9 (L) 06/16/2019    MCHC 30.9 (L) 06/16/2019    RDW 13.7 06/16/2019     06/16/2019       BMP RESULTS:  Lab Results   Component Value Date     06/16/2019    POTASSIUM 4.0 06/16/2019    CHLORIDE 111 (H) 06/16/2019    CO2 24 06/16/2019    ANIONGAP 6 06/16/2019     (H) 06/16/2019    BUN 4 (L) 06/16/2019    CR 0.61 06/16/2019    GFRESTIMATED >90 06/16/2019    GFRESTBLACK >90 06/16/2019    PRANAV 8.0 (L) 06/16/2019        A1C RESULTS:  Lab Results   Component Value Date    A1C 5.4 06/11/2003       INR RESULTS:  No results found for: INR        CC  No referring provider defined for this encounter.    Thank you for allowing me to participate in the care of your " patient.      Sincerely,     Gera Delgado MD     [facility]    cc:   No referring provider defined for this encounter.

## 2019-07-19 NOTE — PROGRESS NOTES
HPI and Plan:   See dictation    No orders of the defined types were placed in this encounter.    Orders Placed This Encounter   Medications     metoprolol succinate ER (TOPROL-XL) 50 MG 24 hr tablet     Sig: Take 1 tablet (50 mg) by mouth daily     Dispense:  30 tablet     Refill:  1     There are no discontinued medications.      Encounter Diagnosis   Name Primary?     Benign essential hypertension Yes       CURRENT MEDICATIONS:  Current Outpatient Medications   Medication Sig Dispense Refill     acetaminophen (TYLENOL) 500 MG tablet Take 1,000 mg by mouth 2 times daily as needed        albuterol (PROAIR HFA/PROVENTIL HFA/VENTOLIN HFA) 108 (90 BASE) MCG/ACT Inhaler Inhale 1-2 puffs into the lungs every 4 hours as needed for shortness of breath / dyspnea or wheezing 1 Inhaler 1     aspirin (ASA) 81 MG EC tablet Take 1 tablet (81 mg) by mouth daily       Camphor-Menthol 0.2-3.5 % LIQD Externally apply 1 spray topically       clopidogrel (PLAVIX) 75 MG tablet Take 1 tablet (75 mg) by mouth daily 90 tablet 3     hydrOXYzine (ATARAX) 25 MG tablet Take 1 tablet (25 mg) by mouth nightly as needed for anxiety or other (insomnia) 30 tablet 1     loratadine (CLARITIN) 10 MG tablet Take 10 mg by mouth daily       metoprolol succinate ER (TOPROL-XL) 50 MG 24 hr tablet Take 1 tablet (50 mg) by mouth daily 30 tablet 1     Multiple Vitamins-Minerals (MULTIVITAMIN GUMMIES WOMENS) CHEW Smarty Pants Women's Complete         ALLERGIES     Allergies   Allergen Reactions     Zithromax [Azithromycin Dihydrate] GI Disturbance       PAST MEDICAL HISTORY:  Past Medical History:   Diagnosis Date     Allergic rhinitis, cause unspecified     Allergic rhinitis     Aneurysm (H)     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 Provider to review and confirm.     Family history of  malignant neoplasm of breast     4 generations on her father's side     FAMILY HX BREAST MALIG      Heart palpitations 2/10    PVC's - dr sandhu     Migraine without aura, with intractable migraine, so stated, without mention of status migrainosus     sees neurologist     Mild persistent asthma      Non morbid obesity due to excess calories 5/9/2016     Simple goiter 11/25/2008    pt has no enlargement and had normal blood work-up at that time     Stroke (H) 06/15/2019    due to L vert artery dissection after chiropractor manipulation       PAST SURGICAL HISTORY:  Past Surgical History:   Procedure Laterality Date     NO HISTORY OF SURGERY         FAMILY HISTORY:  Family History   Problem Relation Age of Onset     Lipids Father      Neurologic Disorder Father         epilepsy     Heart Disease Father 47        MI     Hypertension Father      Hyperlipidemia Father      Lipids Sister      Hyperlipidemia Sister      Breast Cancer Paternal Grandmother         3rd generation      Breast Cancer Paternal Aunt      Diabetes Mother         gestational      Asthma Mother      Diabetes Paternal Grandfather      Other Cancer Maternal Grandfather         Skin     Hyperlipidemia Paternal Half-Sister      Breast Cancer Other      Other Cancer Maternal Grandmother         Lung     Ovarian Cancer Maternal Grandmother      Lung Cancer Maternal Grandmother      Diabetes Maternal Uncle      Diabetes Maternal Uncle      Colon Cancer No family hx of        SOCIAL HISTORY:  Social History     Socioeconomic History     Marital status: Single     Spouse name: none     Number of children: None     Years of education: None     Highest education level: None   Occupational History     Occupation:  at [employer]      Employer: OTHER   Social Needs     Financial resource strain: None     Food insecurity:     Worry: None     Inability: None     Transportation needs:     Medical: None     Non-medical: None   Tobacco Use      Smoking status: Former Smoker     Packs/day: 0.50     Years: 2.00     Pack years: 1.00     Types: Cigarettes     Start date:      Last attempt to quit: 2001     Years since quittin.4     Smokeless tobacco: Never Used   Substance and Sexual Activity     Alcohol use: Yes     Comment: 0-0.5 glasses of wine per month     Drug use: No     Sexual activity: Not Currently     Partners: Male     Birth control/protection: Condom   Lifestyle     Physical activity:     Days per week: None     Minutes per session: None     Stress: None   Relationships     Social connections:     Talks on phone: None     Gets together: None     Attends Scientology service: None     Active member of club or organization: None     Attends meetings of clubs or organizations: None     Relationship status: None     Intimate partner violence:     Fear of current or ex partner: None     Emotionally abused: None     Physically abused: None     Forced sexual activity: None   Other Topics Concern      Service No     Blood Transfusions No     Caffeine Concern No     Occupational Exposure No     Hobby Hazards No     Sleep Concern No     Stress Concern No     Weight Concern Yes     Comment: Would like to lose more weight     Special Diet No     Back Care No     Exercise Yes     Comment: Moderate     Bike Helmet No     Seat Belt Yes     Comment: always      Self-Exams Yes     Comment: SBE encouraged monthly      Parent/sibling w/ CABG, MI or angioplasty before 65F 55M? Yes   Social History Narrative    Calcium - 2-3 dairy servings/ day     Menarche: at age 12    Menses: regular        Review of Systems:  Skin:  Negative     Eyes:  Positive for glasses  ENT:  Negative    Respiratory:  Positive for dyspnea on exertion  Cardiovascular:  Negative for;palpitations Positive for;edema;fatigue;dizziness  Gastroenterology: Positive for heartburn  Genitourinary:  Negative    Musculoskeletal:  Positive for joint pain;back pain  Neurologic:  Positive  "for numbness or tingling of hands  Psychiatric:  Positive for anxiety  Heme/Lymph/Imm:  Positive for allergies  Endocrine:  Negative      Physical Exam:  Vitals: /86   Pulse 82   Ht 1.651 m (5' 5\")   Wt 99.8 kg (220 lb)   BMI 36.61 kg/m      Constitutional:           Skin:             Head:           Eyes:           Lymph:      ENT:           Neck:           Respiratory:            Cardiac:                                                           GI:           Extremities and Muscular Skeletal:                 Neurological:           Psych:         Recent Lab Results:  LIPID RESULTS:  Lab Results   Component Value Date    CHOL 150 06/16/2019    HDL 44 (L) 06/16/2019    LDL 92 06/16/2019    TRIG 72 06/16/2019    CHOLHDLRATIO 3.8 07/21/2014       LIVER ENZYME RESULTS:  Lab Results   Component Value Date    AST 19 09/19/2018    ALT 23 09/19/2018       CBC RESULTS:  Lab Results   Component Value Date    WBC 7.7 06/16/2019    RBC 4.82 06/16/2019    HGB 12.0 06/16/2019    HCT 38.8 06/16/2019    MCV 81 06/16/2019    MCH 24.9 (L) 06/16/2019    MCHC 30.9 (L) 06/16/2019    RDW 13.7 06/16/2019     06/16/2019       BMP RESULTS:  Lab Results   Component Value Date     06/16/2019    POTASSIUM 4.0 06/16/2019    CHLORIDE 111 (H) 06/16/2019    CO2 24 06/16/2019    ANIONGAP 6 06/16/2019     (H) 06/16/2019    BUN 4 (L) 06/16/2019    CR 0.61 06/16/2019    GFRESTIMATED >90 06/16/2019    GFRESTBLACK >90 06/16/2019    PRANAV 8.0 (L) 06/16/2019        A1C RESULTS:  Lab Results   Component Value Date    A1C 5.4 06/11/2003       INR RESULTS:  No results found for: INR        CC  No referring provider defined for this encounter.  "

## 2019-07-19 NOTE — LETTER
7/19/2019      Brittany Miles, APRN CNP  [address] [place] [address]      RE: Sayda Valles       Dear Colleague,    I had the pleasure of seeing Sayda Valles in the [facility].    Service Date: 07/19/2019      HISTORY OF PRESENT ILLNESS:  I had the pleasure Sayda Valles at the [facility] [place].  She is a 33-year-old woman.  She was seen several years ago by my partners, Dr. Page and Dr. Henderson for palpitations which were thought PVCs.  I reviewed that chart.        She was complaining about palpitations.  She had a stress EKG test that showed occasional PVCs but otherwise test was negative.  She wore an event monitor at one point, which was completely negative and another event monitor showed sinus tachycardia and on the few times that she mentioned palpitations, actually nothing was seen, so I do not know how firmly we are sure that the PVCs which were identified on the stress test match her clinical feeling of palpitation.  In any case, she was discharged from the clinic and was recommended to just decrease caffeine.        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  This apparently followed a chiropractic manipulation.  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  She had a followup CT that shows that there is still a dissection present and she is scheduled in the upcoming weeks for a cerebral angiogram.      As part of her workup for stroke, an echocardiogram was done and it was read as showing a PFO.  I reviewed that study and I do not see a PFO and I think the bubble study is negative.  I think what happened was the mitral valve chords looked as though they were causing bubbles, but I do not see any bubbles.  I thought the echo was normal.  Even if she did have a  PFO, it is obvious where the stroke came from and we would not recommend PFO closure.        I reviewed with her today the embryology of the fetal circulation, the adult circulation and how a paradoxical embolus could be causing a stroke with a PFO, but again, that is not the mechanism here.      Incidentally noted on the echo was the aortic diameter was 37 mm.  This is top normal, which if normal  for body surface area probably represents a very mild aortic dilatation.  I also repeated her blood pressure and do note that she has mild diastolic hypertension.        If one looks at the 3 issues, PVCs, borderline aortic root enlargement and mild hypertension, in and of themselves, none specifically need treatment, but I think it is not unreasonable to start her on Toprol-XL 50 mg once a day to treat all 3; that would be the best option.  I normally do not use beta blockers for blood pressure control, especially in younger people, but I think when we have a borderline aortic root enlargement, some hypertension and PVCs, it is actually the ideal medicine.  She has a history of asthma with exercise or with flu illnesses, so we will have to watch for bronchospasm.  I am going to start her on 50 mg once a day.  She has an appointment in 3 days with her internist, so I will ask her internist to decide if they want to continue it or not or change the dose up or down.  I do not think she needs another cardiac followup, but I would recommend a repeat echocardiogram in 3-5 years just to measure the aortic root diameter.        The patient probably should be investigated for fibromuscular dysplasia (FMD), given that she had a vertebral artery dissection, but my guess is that it is all related to the chiropractic manipulation.  I will leave it to the neurosurgeon and the neuroradiologist if they want to look at any other artery, such as the renal arteries, etc., to look for FMD.  Again, it is unlikely that that is the connection.         I have not scheduled any further followup cardiac appointments.  Her cholesterol numbers were minimally abnormal and she is a young person.  She should work on diet and exercise and then have them repeated in a couple of years and her internal medicine doctor can do that.        Today's visit was 35 minutes, all counseling to review the actual angiogram, discussed the embryology of PFO, discussed the vertebral artery, reviewing the echos, the Holter monitors, the event monitors and the CT reports here today.      cc:      Momo Glass MD    [facility]   [address] [place] [address]       Brittany Miles, [place] [address]         ANUP DOWD MD             D: 2019   T: 2019   MT: JOSE      Name:     ROHINI CRUZ   MRN:      [id number]        Account:      [id number]   :      1985           Service Date: 2019      Document: [id number]         Outpatient Encounter Medications as of 2019   Medication Sig Dispense Refill     acetaminophen (TYLENOL) 500 MG tablet Take 1,000 mg by mouth 2 times daily as needed        albuterol (PROAIR HFA/PROVENTIL HFA/VENTOLIN HFA) 108 (90 BASE) MCG/ACT Inhaler Inhale 1-2 puffs into the lungs every 4 hours as needed for shortness of breath / dyspnea or wheezing 1 Inhaler 1     aspirin (ASA) 81 MG EC tablet Take 1 tablet (81 mg) by mouth daily       Camphor-Menthol 0.2-3.5 % LIQD Externally apply 1 spray topically       clopidogrel (PLAVIX) 75 MG tablet Take 1 tablet (75 mg) by mouth daily 90 tablet 3     hydrOXYzine (ATARAX) 25 MG tablet Take 1 tablet (25 mg) by mouth nightly as needed for anxiety or other (insomnia) 30 tablet 1     loratadine (CLARITIN) 10 MG tablet Take 10 mg by mouth daily       Multiple Vitamins-Minerals (MULTIVITAMIN GUMMIES WOMENS) CHEW Smarty Pants Women's Complete       [DISCONTINUED] metoprolol succinate ER (TOPROL-XL) 50 MG 24  hr tablet Take 1 tablet (50 mg) by mouth daily 30 tablet 1     No facility-administered encounter medications on file as of 7/19/2019.        Again, thank you for allowing me to participate in the care of your patient.      Sincerely,    Gera Delgado MD     [facility]

## 2019-07-22 ENCOUNTER — OFFICE VISIT (OUTPATIENT)
Dept: FAMILY MEDICINE | Facility: CLINIC | Age: 34
End: 2019-07-22
Payer: COMMERCIAL

## 2019-07-22 VITALS
HEART RATE: 114 BPM | BODY MASS INDEX: 37.15 KG/M2 | OXYGEN SATURATION: 99 % | TEMPERATURE: 98 F | SYSTOLIC BLOOD PRESSURE: 118 MMHG | DIASTOLIC BLOOD PRESSURE: 92 MMHG | WEIGHT: 223 LBS | HEIGHT: 65 IN

## 2019-07-22 DIAGNOSIS — I10 BENIGN ESSENTIAL HYPERTENSION: ICD-10-CM

## 2019-07-22 DIAGNOSIS — I77.74 DISSECTING HEMORRHAGE OF LEFT VERTEBRAL ARTERY (H): ICD-10-CM

## 2019-07-22 DIAGNOSIS — I77.89 AORTIC ROOT ENLARGEMENT (H): ICD-10-CM

## 2019-07-22 DIAGNOSIS — R39.89 URINARY PROBLEM: Primary | ICD-10-CM

## 2019-07-22 DIAGNOSIS — I63.9 CEREBROVASCULAR ACCIDENT (CVA), UNSPECIFIED MECHANISM (H): ICD-10-CM

## 2019-07-22 DIAGNOSIS — F41.9 ANXIETY: ICD-10-CM

## 2019-07-22 LAB
ALBUMIN UR-MCNC: NEGATIVE MG/DL
APPEARANCE UR: CLEAR
BACTERIA #/AREA URNS HPF: ABNORMAL /HPF
BILIRUB UR QL STRIP: NEGATIVE
COLOR UR AUTO: YELLOW
GLUCOSE UR STRIP-MCNC: NEGATIVE MG/DL
HGB UR QL STRIP: NEGATIVE
KETONES UR STRIP-MCNC: NEGATIVE MG/DL
LEUKOCYTE ESTERASE UR QL STRIP: ABNORMAL
NITRATE UR QL: NEGATIVE
NON-SQ EPI CELLS #/AREA URNS LPF: ABNORMAL /LPF
PH UR STRIP: 7 PH (ref 5–7)
RBC #/AREA URNS AUTO: ABNORMAL /HPF
SOURCE: ABNORMAL
SP GR UR STRIP: 1.01 (ref 1–1.03)
UROBILINOGEN UR STRIP-ACNC: 0.2 EU/DL (ref 0.2–1)
WBC #/AREA URNS AUTO: ABNORMAL /HPF

## 2019-07-22 PROCEDURE — 81001 URINALYSIS AUTO W/SCOPE: CPT | Performed by: NURSE PRACTITIONER

## 2019-07-22 PROCEDURE — 99214 OFFICE O/P EST MOD 30 MIN: CPT | Performed by: NURSE PRACTITIONER

## 2019-07-22 RX ORDER — METOPROLOL SUCCINATE 25 MG/1
25 TABLET, EXTENDED RELEASE ORAL DAILY
Qty: 30 TABLET | Refills: 6 | Status: SHIPPED | OUTPATIENT
Start: 2019-07-22 | End: 2019-10-31

## 2019-07-22 ASSESSMENT — MIFFLIN-ST. JEOR: SCORE: 1712.4

## 2019-07-22 NOTE — PROGRESS NOTES
Subjective     Sayda Valles is a 33 year old female who presents to clinic today for the following health issues:    HPI     Follow up from stroke recheck after visit with neurology-  Doing well, has some concerns today.    Keeps getting a tingling sensation on right part of head and left upper lip, not sure if she has a mole there, in the past has had moles removed in head. Not sure if due to stroke or the mole.  This was present prior to stroke.      Vision has changed - has to wear glasses everyday now- vision has been blurry, glasses helps not sure if she should go back to her regular opthamalogist or get a referral to a neuro ophthalmologist.  +More light sensitivity.    Planning cerebral angiogram within the next 1-2 months.  Patient needs to schedule.      Cardiologist doesn't think she has a PFO- was given a Beta blocker and not sure if she should start this and what the plan should be.     Has gone back to work today- feeling good about this.     Traveling concerns- is supposed to go to [place] for work this week.     X 3-4 days, has increased fluid intake but has lower abdominal pain when urinating but no burning.     Is considering counseling for anxiety.        Patient Active Problem List   Diagnosis     Allergic rhinitis     Varicose Veins of Legs- painful with standing     Abnormal Mammogram- 2/2006 - prob benign      Heart palpitations - since 2010 intermittent. Reports multiple work-ups with unclear cause. Followed by cardiology.     CARDIOVASCULAR SCREENING; LDL GOAL LESS THAN 160     Anxiety     Migraine with aura and without status migrainosus, not intractable - since 2003; stable.     Mild persistent asthma without complication     Bilateral ankle pain     Family history of breast cancer - paternal grandma, paternal aunt and paternal great-grandmother. Dad completed genetic screening which was neg.      Class 2 obesity due to excess calories without serious comorbidity with body mass index (BMI)  of 37.0 to 37.9 in adult     Segmental dysfunction of cervical region     Segmental dysfunction of thoracic region     Segmental dysfunction of lumbar region     Segmental dysfunction of sacral region     Mechanical back pain     Stroke (H)     Dissecting hemorrhage of left vertebral artery (H)     Borderline Aortic root enlargement (H)     Past Surgical History:   Procedure Laterality Date     NO HISTORY OF SURGERY         Social History     Tobacco Use     Smoking status: Former Smoker     Packs/day: 0.50     Years: 2.00     Pack years: 1.00     Types: Cigarettes     Start date:      Last attempt to quit: 2001     Years since quittin.4     Smokeless tobacco: Never Used   Substance Use Topics     Alcohol use: Yes     Comment: 0-0.5 glasses of wine per month     Family History   Problem Relation Age of Onset     Lipids Father      Neurologic Disorder Father         epilepsy     Heart Disease Father 47        MI     Hypertension Father      Hyperlipidemia Father      Lipids Sister      Hyperlipidemia Sister      Breast Cancer Paternal Grandmother         3rd generation      Breast Cancer Paternal Aunt      Diabetes Mother         gestational      Asthma Mother      Diabetes Paternal Grandfather      Other Cancer Maternal Grandfather         Skin     Hyperlipidemia Paternal Half-Sister      Breast Cancer Other      Other Cancer Maternal Grandmother         Lung     Ovarian Cancer Maternal Grandmother      Lung Cancer Maternal Grandmother      Diabetes Maternal Uncle      Diabetes Maternal Uncle      Colon Cancer No family hx of          Current Outpatient Medications   Medication Sig Dispense Refill     metoprolol succinate ER (TOPROL-XL) 25 MG 24 hr tablet Take 1 tablet (25 mg) by mouth daily 30 tablet 6     acetaminophen (TYLENOL) 500 MG tablet Take 1,000 mg by mouth 2 times daily as needed        albuterol (PROAIR HFA/PROVENTIL HFA/VENTOLIN HFA) 108 (90 BASE) MCG/ACT Inhaler Inhale 1-2 puffs into the  "lungs every 4 hours as needed for shortness of breath / dyspnea or wheezing 1 Inhaler 1     aspirin (ASA) 81 MG EC tablet Take 1 tablet (81 mg) by mouth daily       Camphor-Menthol 0.2-3.5 % LIQD Externally apply 1 spray topically       clopidogrel (PLAVIX) 75 MG tablet Take 1 tablet (75 mg) by mouth daily 90 tablet 3     hydrOXYzine (ATARAX) 25 MG tablet Take 1 tablet (25 mg) by mouth nightly as needed for anxiety or other (insomnia) 30 tablet 1     loratadine (CLARITIN) 10 MG tablet Take 10 mg by mouth daily       Multiple Vitamins-Minerals (MULTIVITAMIN GUMMIES WOMENS) CHEW Smarty Pants Women's Complete       Allergies   Allergen Reactions     Zithromax [Azithromycin Dihydrate] GI Disturbance       Reviewed and updated as needed this visit by Provider         Review of Systems   ROS COMP: Constitutional, HEENT, cardiovascular, pulmonary, gi and gu systems are negative, except as otherwise noted.      Objective    BP (!) 118/92 (BP Location: Right arm, Patient Position: Sitting, Cuff Size: Adult Large)   Pulse 114   Temp 98  F (36.7  C) (Oral)   Ht 1.651 m (5' 5\")   Wt 101.2 kg (223 lb)   SpO2 99%   BMI 37.11 kg/m    Body mass index is 37.11 kg/m .  Physical Exam   G  ENERAL: healthy, alert and no distress  RESP: lungs clear to auscultation - no rales, rhonchi or wheezes  CV: regular rate and rhythm, normal S1 S2, no S3 or S4, no murmur, click or rub, no peripheral edema   NEURO: Normal strength and tone, mentation intact and speech normal  PSYCH: mentation appears normal, affect normal/bright          Assessment & Plan     Sayda was seen today for recheck.    Diagnoses and all orders for this visit:    Urinary problem  -     *UA reflex to Microscopic and Culture ([facility] (except [place] and [place])  -     Urine Microscopic: not suggestive of infection  Results for orders placed or performed in visit on 07/22/19   *UA reflex to Microscopic and Culture ([facility] " (except [place] and [place])   Result Value Ref Range    Color Urine Yellow     Appearance Urine Clear     Glucose Urine Negative NEG^Negative mg/dL    Bilirubin Urine Negative NEG^Negative    Ketones Urine Negative NEG^Negative mg/dL    Specific Gravity Urine 1.010 1.003 - 1.035    Blood Urine Negative NEG^Negative    pH Urine 7.0 5.0 - 7.0 pH    Protein Albumin Urine Negative NEG^Negative mg/dL    Urobilinogen Urine 0.2 0.2 - 1.0 EU/dL    Nitrite Urine Negative NEG^Negative    Leukocyte Esterase Urine Trace (A) NEG^Negative    Source Midstream Urine    Urine Microscopic   Result Value Ref Range    WBC Urine 0 - 5 OTO5^0 - 5 /HPF    RBC Urine O - 2 OTO2^O - 2 /HPF    Squamous Epithelial /LPF Urine Few FEW^Few /LPF    Bacteria Urine Few (A) NEG^Negative /HPF       Anxiety  THELMA-7 SCORE 12/11/2012 5/30/2014 6/18/2019   Total Score 1 0 -   Total Score - - 6     Patient reports anxiety regarding medications, various environmental triggers.   Discussed importance of therapy referral, patient is open to this and will plan to initiate.    -     MENTAL HEALTH REFERRAL  - Adult; Outpatient Treatment; Individual/Couples/Family/Group Therapy/Health Psychology; [facility]: [facility] [phone]; We will contact you to schedule the appointment or please call with any questions    Cerebrovascular accident (CVA), unspecified mechanism (H)  Dissecting hemorrhage of left vertebral artery (H)  Aspirin, 81 mg daily and Clopidogrel, 75 mg daily.    No statin therapy per neurology.  Neurology consultation completed on 7/12/19 with Dr. Glass.    Planning cerebral angiogram within the next 1-2 months.        Benign essential hypertension  Borderline aortic root enlargement  History of PVC's.    Cardiology consultation on 7/19/19 with Dr. Delgado.  Plan to treat above with Metoprolol CR, 50 mg once daily.  Patient is concerned about starting this medication (anxiety regarding medications).  Will plan to lower dose and start  "Toprol XL, 25 mg daily and have patient follow-up in 1 month for recheck.  Plan repeat echocardiogram in 3-5 years to measure aortic root diameter.    -     metoprolol succinate ER (TOPROL-XL) 25 MG 24 hr tablet; Take 1 tablet (25 mg) by mouth daily       BMI:   Estimated body mass index is 37.11 kg/m  as calculated from the following:    Height as of this encounter: 1.651 m (5' 5\").    Weight as of this encounter: 101.2 kg (223 lb).   Weight management plan: Discussed healthy diet and exercise guidelines        Return in about 1 month (around 8/22/2019) for Med check.    CHE [facility]      "

## 2019-07-22 NOTE — PATIENT INSTRUCTIONS
Urinary problem  -     *UA reflex to Microscopic and Culture ([facility] (except [place] and [place])  -     Urine Microscopic  Results for orders placed or performed in visit on 07/22/19   *UA reflex to Microscopic and Culture ([facility] (except [place] and [place])   Result Value Ref Range    Color Urine Yellow     Appearance Urine Clear     Glucose Urine Negative NEG^Negative mg/dL    Bilirubin Urine Negative NEG^Negative    Ketones Urine Negative NEG^Negative mg/dL    Specific Gravity Urine 1.010 1.003 - 1.035    Blood Urine Negative NEG^Negative    pH Urine 7.0 5.0 - 7.0 pH    Protein Albumin Urine Negative NEG^Negative mg/dL    Urobilinogen Urine 0.2 0.2 - 1.0 EU/dL    Nitrite Urine Negative NEG^Negative    Leukocyte Esterase Urine Trace (A) NEG^Negative    Source Midstream Urine    Urine Microscopic   Result Value Ref Range    WBC Urine 0 - 5 OTO5^0 - 5 /HPF    RBC Urine O - 2 OTO2^O - 2 /HPF    Squamous Epithelial /LPF Urine Few FEW^Few /LPF    Bacteria Urine Few (A) NEG^Negative /HPF       Anxiety  [facility] [place], [place] [address]  Phone: [phone]    Cerebrovascular accident (CVA), unspecified mechanism (H)  Dissecting hemorrhage of left vertebral artery (H)  Plan scheduling cerebral angiogram.      Benign essential hypertension  -     metoprolol succinate ER (TOPROL-XL) 25 MG 24 hr tablet; Take 1 tablet (25 mg) by mouth daily

## 2019-08-26 ENCOUNTER — OFFICE VISIT (OUTPATIENT)
Dept: FAMILY MEDICINE | Facility: CLINIC | Age: 34
End: 2019-08-26
Payer: COMMERCIAL

## 2019-08-26 VITALS
WEIGHT: 223 LBS | HEART RATE: 106 BPM | DIASTOLIC BLOOD PRESSURE: 94 MMHG | OXYGEN SATURATION: 100 % | HEIGHT: 65 IN | BODY MASS INDEX: 37.15 KG/M2 | SYSTOLIC BLOOD PRESSURE: 118 MMHG | TEMPERATURE: 98.2 F

## 2019-08-26 DIAGNOSIS — B07.8 COMMON WART: ICD-10-CM

## 2019-08-26 DIAGNOSIS — I10 BENIGN ESSENTIAL HYPERTENSION: Primary | ICD-10-CM

## 2019-08-26 PROCEDURE — 17110 DESTRUCTION B9 LES UP TO 14: CPT | Performed by: NURSE PRACTITIONER

## 2019-08-26 PROCEDURE — 99213 OFFICE O/P EST LOW 20 MIN: CPT | Mod: 25 | Performed by: NURSE PRACTITIONER

## 2019-08-26 ASSESSMENT — MIFFLIN-ST. JEOR: SCORE: 1712.4

## 2019-08-26 NOTE — PROGRESS NOTES
Subjective     Sayda Valles is a 33 year old female who presents to clinic today for the following health issues:    HPI     Recheck after stroke,  Feels like blood is rushing, if bending or sitting to standing-intermittent.  Only had pressure in head last week that was constant and doesn't feel like this currently.   Once in a while- will hear ringing in one ear and muffled in the other- has experienced it twice in the last two weeks.  Whoosh of a warm light headed feeling last 10 seconds if that.  Had this intermittently before stroke, this was maybe once a month that it would happen.  Could be related to head movement- can't pin point what would bring this on.      Has not started Metoprolol- concerned about side effects- has asthma- concerned about the breathing side effect.    Derm- possible wart on left hand- one.       Patient Active Problem List   Diagnosis     Allergic rhinitis     Varicose Veins of Legs- painful with standing     Abnormal Mammogram- 2/2006 - prob benign      Heart palpitations - since 2010 intermittent. Reports multiple work-ups with unclear cause. Followed by cardiology.     CARDIOVASCULAR SCREENING; LDL GOAL LESS THAN 160     Anxiety     Migraine with aura and without status migrainosus, not intractable - since 2003; stable.     Mild persistent asthma without complication     Bilateral ankle pain     Family history of breast cancer - paternal grandma, paternal aunt and paternal great-grandmother. Dad completed genetic screening which was neg.      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)     Borderline Aortic root enlargement (H)     Past  Surgical History:   Procedure Laterality Date     NO HISTORY OF SURGERY         Social History     Tobacco Use     Smoking status: Former Smoker     Packs/day: 0.50     Years: 2.00     Pack years: 1.00     Types: Cigarettes     Start date:      Last attempt to quit: 2001     Years since quittin.5     Smokeless tobacco: Never Used   Substance Use Topics     Alcohol use: Yes     Comment: 0-0.5 glasses of wine per month     Family History   Problem Relation Age of Onset     Lipids Father      Neurologic Disorder Father         epilepsy     Heart Disease Father 47        MI     Hypertension Father      Hyperlipidemia Father      Lipids Sister      Hyperlipidemia Sister      Breast Cancer Paternal Grandmother         3rd generation      Breast Cancer Paternal Aunt      Diabetes Mother         gestational      Asthma Mother      Diabetes Paternal Grandfather      Other Cancer Maternal Grandfather         Skin     Hyperlipidemia Paternal Half-Sister      Breast Cancer Other      Other Cancer Maternal Grandmother         Lung     Ovarian Cancer Maternal Grandmother      Lung Cancer Maternal Grandmother      Diabetes Maternal Uncle      Diabetes Maternal Uncle      Colon Cancer No family hx of          Current Outpatient Medications   Medication Sig Dispense Refill     acetaminophen (TYLENOL) 500 MG tablet Take 1,000 mg by mouth 2 times daily as needed        albuterol (PROAIR HFA/PROVENTIL HFA/VENTOLIN HFA) 108 (90 BASE) MCG/ACT Inhaler Inhale 1-2 puffs into the lungs every 4 hours as needed for shortness of breath / dyspnea or wheezing 1 Inhaler 1     aspirin (ASA) 81 MG EC tablet Take 1 tablet (81 mg) by mouth daily       Camphor-Menthol 0.2-3.5 % LIQD Externally apply 1 spray topically       clopidogrel (PLAVIX) 75 MG tablet Take 1 tablet (75 mg) by mouth daily 90 tablet 3     hydrOXYzine (ATARAX) 25 MG tablet Take 1 tablet (25 mg) by mouth nightly as needed for anxiety or other (insomnia) 30 tablet 1      "loratadine (CLARITIN) 10 MG tablet Take 10 mg by mouth daily       metoprolol succinate ER (TOPROL-XL) 25 MG 24 hr tablet Take 1 tablet (25 mg) by mouth daily 30 tablet 6     Multiple Vitamins-Minerals (MULTIVITAMIN GUMMIES WOMENS) CHEW Smarty Pants Women's Complete       Allergies   Allergen Reactions     Zithromax [Azithromycin Dihydrate] GI Disturbance       Reviewed and updated as needed this visit by Provider         Review of Systems   ROS COMP: Constitutional, HEENT, cardiovascular, pulmonary, gi and gu systems are negative, except as otherwise noted.      Objective    BP (!) 118/94 (BP Location: Right arm, Patient Position: Sitting, Cuff Size: Adult Regular)   Pulse 106   Temp 98.2  F (36.8  C) (Oral)   Ht 1.651 m (5' 5\")   Wt 101.2 kg (223 lb)   SpO2 100%   BMI 37.11 kg/m    Body mass index is 37.11 kg/m .  Physical Exam   GENERAL: healthy, alert and no distress  RESP: lungs clear to auscultation - no rales, rhonchi or wheezes  CV: regular rate and rhythm, normal S1 S2, no S3 or S4, no murmur, click or rub, no peripheral edema and peripheral pulses strong  SKIN: left hand with cluster of 3 hyperkeratotic lesions on knuckles, liquid nitrogen applied in typical fashion x3 for 3 seconds each  PSYCH: mentation appears normal, affect normal/bright        Assessment & Plan     Sayda was seen today for recheck.    Diagnoses and all orders for this visit:    Benign essential hypertension  Borderline aortic root enlargement  History of PVC's.    Cardiology consultation on 7/19/19 with Dr. Delgado.  Plan to treat above with Metoprolol CR, 50 mg once daily.  Patient is still concerned about starting this medication (anxiety regarding medications).  Lower dose of Toprol XL, 25 mg daily initiated and will plan to have patient follow-up for recheck in 2-4 weeks.  Plan repeat echocardiogram in 3-5 years to measure aortic root diameter.     Common wart  -     DESTRUCT BENIGN LESION, UP TO 14          Return in about 2 " weeks (around 9/9/2019) for BP Recheck.    Brittany Miles, CHE [facility]

## 2019-09-10 ENCOUNTER — OFFICE VISIT (OUTPATIENT)
Dept: FAMILY MEDICINE | Facility: CLINIC | Age: 34
End: 2019-09-10
Payer: COMMERCIAL

## 2019-09-10 VITALS
HEART RATE: 98 BPM | TEMPERATURE: 97.9 F | BODY MASS INDEX: 38.49 KG/M2 | OXYGEN SATURATION: 99 % | WEIGHT: 231 LBS | DIASTOLIC BLOOD PRESSURE: 88 MMHG | SYSTOLIC BLOOD PRESSURE: 124 MMHG | HEIGHT: 65 IN

## 2019-09-10 DIAGNOSIS — I10 BENIGN ESSENTIAL HYPERTENSION: Primary | ICD-10-CM

## 2019-09-10 PROCEDURE — 99214 OFFICE O/P EST MOD 30 MIN: CPT | Performed by: NURSE PRACTITIONER

## 2019-09-10 ASSESSMENT — MIFFLIN-ST. JEOR: SCORE: 1748.69

## 2019-09-10 NOTE — PROGRESS NOTES
Subjective     Sayda Valles is a 33 year old female who presents to clinic today for the following health issues:    HPI   Hypertension Follow-up      Do you check your blood pressure regularly outside of the clinic? No     Are you following a low salt diet? Yes - confused about doing low salt - more conscious about it.       Are your blood pressures ever more than 140 on the top number (systolic) OR more   than 90 on the bottom number (diastolic), for example 140/90? Not sure -since she hasn't checked it       How many servings of fruits and vegetables do you eat daily?  2-3    On average, how many sweetened beverages do you drink each day (soda, juice, sweet tea, etc)?   2-3     How many days per week do you miss taking your medication? 0    Has been feeling better overall and hasn't been feeling blood rushing feeling in head.  Minimal headaches.    Decrease in heart palpitations.  Slight fatigue with Metoprolol.  Cut out caffeine in June 2019.      Still hasn't scheduled cerebral angiogram, planning clip or coil surgery after cerebral angiogram.      Sitting in a meeting today- had a flash of light headiness - all of the sudden the right ear gets muffled- leaned back and then sat up in chair and this is when this happened- has had this happen a lot before stroke (onset 1-2 years ago).  In the past has had the ringing in ear as well- only last about 30 seconds.       Previously played co-ed softball.  Played this past weekend and did well.          Patient Active Problem List   Diagnosis     Allergic rhinitis     Varicose Veins of Legs- painful with standing     Abnormal Mammogram- 2/2006 - prob benign      Heart palpitations - since 2010 intermittent. Reports multiple work-ups with unclear cause. Followed by cardiology.     CARDIOVASCULAR SCREENING; LDL GOAL LESS THAN 160     Anxiety     Migraine with aura and without status migrainosus, not intractable - since 2003; stable.     Mild persistent asthma without  complication     Bilateral ankle pain     Family history of breast cancer - paternal grandma, paternal aunt and paternal great-grandmother. Dad completed genetic screening which was neg.      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)     Borderline Aortic root enlargement (H)     Past Surgical History:   Procedure Laterality Date     NO HISTORY OF SURGERY         Social History     Tobacco Use     Smoking status: Former Smoker     Packs/day: 0.50     Years: 2.00     Pack years: 1.00     Types: Cigarettes     Start date:      Last attempt to quit: 2001     Years since quittin.6     Smokeless tobacco: Never Used   Substance Use Topics     Alcohol use: Yes     Comment: 0-0.5 glasses of wine per month     Family History   Problem Relation Age of Onset     Lipids Father      Neurologic Disorder Father         epilepsy     Heart Disease Father 47        MI     Hypertension Father      Hyperlipidemia Father      Lipids Sister      Hyperlipidemia Sister      Breast Cancer Paternal Grandmother         3rd generation      Breast Cancer Paternal Aunt      Diabetes Mother         gestational      Asthma Mother      Diabetes Paternal Grandfather      Other Cancer Maternal Grandfather         Skin     Hyperlipidemia Paternal Half-Sister      Breast Cancer Other      Other Cancer Maternal Grandmother         Lung     Ovarian Cancer Maternal Grandmother      Lung Cancer Maternal Grandmother      Diabetes Maternal Uncle      Diabetes Maternal Uncle      Colon Cancer No family hx of          Current Outpatient Medications   Medication Sig Dispense Refill     acetaminophen  "(TYLENOL) 500 MG tablet Take 1,000 mg by mouth 2 times daily as needed        albuterol (PROAIR HFA/PROVENTIL HFA/VENTOLIN HFA) 108 (90 BASE) MCG/ACT Inhaler Inhale 1-2 puffs into the lungs every 4 hours as needed for shortness of breath / dyspnea or wheezing 1 Inhaler 1     aspirin (ASA) 81 MG EC tablet Take 1 tablet (81 mg) by mouth daily       Camphor-Menthol 0.2-3.5 % LIQD Externally apply 1 spray topically       clopidogrel (PLAVIX) 75 MG tablet Take 1 tablet (75 mg) by mouth daily 90 tablet 3     hydrOXYzine (ATARAX) 25 MG tablet Take 1 tablet (25 mg) by mouth nightly as needed for anxiety or other (insomnia) 30 tablet 1     loratadine (CLARITIN) 10 MG tablet Take 10 mg by mouth daily       metoprolol succinate ER (TOPROL-XL) 25 MG 24 hr tablet Take 1 tablet (25 mg) by mouth daily 30 tablet 6     Multiple Vitamins-Minerals (MULTIVITAMIN GUMMIES WOMENS) CHEW Smarty Pants Women's Complete       Allergies   Allergen Reactions     Zithromax [Azithromycin Dihydrate] GI Disturbance       Reviewed and updated as needed this visit by Provider         Review of Systems   ROS COMP: Constitutional, HEENT, cardiovascular, pulmonary, gi and gu systems are negative, except as otherwise noted.      Objective    /88 (BP Location: Right arm, Patient Position: Sitting, Cuff Size: Adult Large)   Pulse 98   Temp 97.9  F (36.6  C) (Oral)   Ht 1.651 m (5' 5\")   Wt 104.8 kg (231 lb)   SpO2 99%   BMI 38.44 kg/m    Body mass index is 38.44 kg/m .  Physical Exam   GENERAL: healthy, alert and no distress  EYES: Eyes grossly normal to inspection  RESP: lungs clear to auscultation - no rales, rhonchi or wheezes  CV: regular rate and rhythm, normal S1 S2, no S3 or S4, no murmur, click or rub  NEURO: CN II-XII intact, Normal strength and tone, mentation intact and speech normal  PSYCH: mentation appears normal, affect normal/bright          Assessment & Plan     Sayda was seen today for hypertension.    Diagnoses and all orders " for this visit:    Benign essential hypertension  Improved control with initiation of Metoprolol (Toprol XL), 25 mg daily. Continue Toprol XL, 25 mg daily.      BMI 38.0-38.9,adult  -     BARIATRIC ADULT REFERRAL - 24 week healthy lifestyle referral.    -     Counseled patient regarding lifestyle and dietary modifications, recommended 150 minutes of exercise each week.          Return in about 3 months (around 12/10/2019).    [facility]

## 2019-09-16 ENCOUNTER — TRANSFERRED RECORDS (OUTPATIENT)
Dept: HEALTH INFORMATION MANAGEMENT | Facility: CLINIC | Age: 34
End: 2019-09-16

## 2019-09-17 ENCOUNTER — NURSE TRIAGE (OUTPATIENT)
Dept: FAMILY MEDICINE | Facility: CLINIC | Age: 34
End: 2019-09-17

## 2019-09-17 NOTE — TELEPHONE ENCOUNTER
[facility] reviewed. Patient reported sore throat for one day. Patient was screened for strep throat which was negative. Provider suspected viral illness and home cares were given.    Placed call to patient and received voicemail. Left a voicemail for patient to call clinic back.    Answer Assessment - Initial Assessment Questions  N/A - unable to reach patient.    Protocols used: SORE THROAT-A-OH

## 2019-09-17 NOTE — TELEPHONE ENCOUNTER
Reason for Call:  Other call back    Detailed comments: Pt very upset ,  Pt states has been waiting over 2 hrs for a call back   She is wondering if needs to be seen.  Pt hung up    Phone Number Patient can be reached at: Home number on file [phone] (home)    Best Time: na  Can we leave a detailed message on this number? na    Call taken on 9/17/2019 at 1:00 PM by Nikki Orantes

## 2019-09-17 NOTE — TELEPHONE ENCOUNTER
Reason for call:  Patient reporting a symptom    Symptom or request: The patient is calling saying she went to a [facility] yesterday for a sore throat. She thinks she has tonsillitis. She had tonsillitis back in April and says it's the same symptoms. She says she lives an hour away from the clinic and wants to know if it would be worth it to come in and get it checked out.    Duration (how long have symptoms been present): Two days    Have you been treated for this before? Yes    Phone Number patient can be reached at:  Cell number on file:    Telephone Information:   Mobile [phone]     Best Time:  Anytime    Can we leave a detailed message on this number:  YES    Call taken on 9/17/2019 at 10:55 AM by Miroslava Vázquez

## 2019-09-20 NOTE — TELEPHONE ENCOUNTER
Call placed to Sayda.    Says she is doing much, much better, and has seen a provider for her illness. Verbalized she was frustrated with phone rep who answered her call initially said it was not good experience. She said it was another location in [facility] and not [place]. Says she loves coming to [facility]. I apologized for her experience and advised we strive to give good care and customer service. She says she is aware of that and it was just an unpleasant experience but she is doing well now and does not need any further assistance at this time. She was pleasant and verbalized she had no further concerns that needed to be addressed.     Rama Heaton R.N.

## 2019-10-01 ENCOUNTER — OFFICE VISIT (OUTPATIENT)
Dept: FAMILY MEDICINE | Facility: CLINIC | Age: 34
End: 2019-10-01
Payer: COMMERCIAL

## 2019-10-01 VITALS
HEART RATE: 64 BPM | TEMPERATURE: 98.2 F | BODY MASS INDEX: 37.61 KG/M2 | RESPIRATION RATE: 16 BRPM | WEIGHT: 226 LBS | SYSTOLIC BLOOD PRESSURE: 136 MMHG | DIASTOLIC BLOOD PRESSURE: 88 MMHG

## 2019-10-01 DIAGNOSIS — R07.0 THROAT PAIN: Primary | ICD-10-CM

## 2019-10-01 LAB
DEPRECATED S PYO AG THROAT QL EIA: NORMAL
SPECIMEN SOURCE: NORMAL

## 2019-10-01 PROCEDURE — 87081 CULTURE SCREEN ONLY: CPT | Performed by: FAMILY MEDICINE

## 2019-10-01 PROCEDURE — 99213 OFFICE O/P EST LOW 20 MIN: CPT | Performed by: FAMILY MEDICINE

## 2019-10-01 PROCEDURE — 87880 STREP A ASSAY W/OPTIC: CPT | Performed by: FAMILY MEDICINE

## 2019-10-01 NOTE — PROGRESS NOTES
"Subjective     Sayda Valles is a 33 year old female who presents to clinic today for the following health issues:    HPI   Acute Illness   Acute illness concerns: Sore Throat  Onset: ***    Fever: { :696768::\"no\"}    Chills/Sweats: { :003085::\"no\"}    Headache (location?): { :515540::\"no\"}    Sinus Pressure:{.:585377::\"no\"}    Conjunctivitis:  {.:309661::\"no\"}    Ear Pain: {.:170353::\"no\"}    Rhinorrhea: { :487517::\"no\"}    Congestion: { :542535::\"no\"}    Sore Throat: { :266261::\"no\"}     Cough: {.:293640::\"no\"}    Wheeze: { :298772::\"no\"}    Decreased Appetite: { :705306::\"no\"}    Nausea: { :746598::\"no\"}    Vomiting: { :911453::\"no\"}    Diarrhea:  { :608980::\"no\"}    Dysuria/Freq.: { :878933::\"no\"}    Fatigue/Achiness: { :076732::\"no\"}    Sick/Strep Exposure: { :581363::\"no\"}     Therapies Tried and outcome: ***    Reviewed and updated as needed this visit by provider:         Review of Systems   Constitutional, HEENT, cardiovascular, pulmonary, GI, , musculoskeletal, neuro, skin, endocrine and psych systems are negative, except as otherwise noted.      Objective   There were no vitals taken for this visit. There is no height or weight on file to calculate BMI.  Physical Exam   GENERAL: no apparent distress  EYES: Conjunctiva are not injected, no discharge.  EARS: Left TM -no erythema, no effusion,  not bulged.               Right TM -no erythema, no effusion,  not bulged.  NOSE: no discharge, no sinus tenderness  THROAT: no erythema, no exudate, no lesions  NECK: supple, no adenopathy.  CARDIAC: regular rate and rhythm, no murmur  RESP: clear, no wheezing, no rales, no rhonchi  ABD: soft, no distension, no tenderness  SKIN: No rashes    {DIAGNOSTIC TEST RESULTS (Optional):321847::\"Diagnostic Test Results\"}        Assessment & Plan     There are no diagnoses linked to this encounter.    Symptomatic cares and fever control(if indicated) discussed.  Risks and benefits of meds discussed.      BMI:   Estimated body " "mass index is 38.44 kg/m  as calculated from the following:    Height as of 9/10/19: 1.651 m (5' 5\").    Weight as of 9/10/19: 104.8 kg (231 lb).   {Weight Management Plan needed for ACO:385523}        See Patient Instructions    No follow-ups on file.          Brando Copeland MD   Pager - [phone]  [facility]  "

## 2019-10-01 NOTE — PROGRESS NOTES
"Subjective     Sayda Valles is a 33 year old female who presents to clinic today for the following health issues:    HPI   Acute Illness   Acute illness concerns: sore throat/scratchy      Fever: no    Chills/Sweats: no    Headache (location?): no    Sinus Pressure:no    Conjunctivitis:  no    Ear Pain: no    Rhinorrhea: no    Congestion: no    Sore Throat: YES     Cough: no    Wheeze: no    Decreased Appetite: no    Nausea: no    Vomiting: no    Diarrhea:  no    Dysuria/Freq.: no    Fatigue/Achiness: YES    Sick/Strep Exposure: boss with strep, did travel together last week      Therapies Tried and outcome: Tyl            Reviewed and updated as needed this visit by Provider         Review of Systems   ROS COMP: Constitutional, HEENT, cardiovascular, pulmonary, gi and gu systems are negative, except as otherwise noted.      Objective    /88   Pulse 64   Temp 98.2  F (36.8  C) (Tympanic)   Resp 16   Wt 102.5 kg (226 lb)   LMP 09/25/2019   BMI 37.61 kg/m    Body mass index is 37.61 kg/m .  Physical Exam   GENERAL: healthy, alert and no distress  NECK: no adenopathy, no asymmetry, masses, or scars and thyroid normal to palpation  Slight erythema in the posterior pharangial wall , no exudates.  RESP: lungs clear to auscultation - no rales, rhonchi or wheezes  CV: regular rate and rhythm, normal S1 S2, no S3 or S4, no murmur, click or rub, no peripheral edema and peripheral pulses strong              Assessment & Plan     1. Throat pain  Patient rapid strep is done which is negative.  I suggested patient to continue symptomatic care.  I would not suggest any antibiotic.  Salt water gargles .  Would not suggest any antibiotic.  - Rapid strep screen  - Beta strep group A culture     BMI:   Estimated body mass index is 37.61 kg/m  as calculated from the following:    Height as of 9/10/19: 1.651 m (5' 5\").    Weight as of this encounter: 102.5 kg (226 lb).     Terry Raphael MD  [facility]      "

## 2019-10-02 LAB
BACTERIA SPEC CULT: NORMAL
SPECIMEN SOURCE: NORMAL

## 2019-10-02 ASSESSMENT — ASTHMA QUESTIONNAIRES: ACT_TOTALSCORE: 23

## 2019-10-09 ENCOUNTER — NURSE TRIAGE (OUTPATIENT)
Dept: NURSING | Facility: CLINIC | Age: 34
End: 2019-10-09

## 2019-10-09 ENCOUNTER — OFFICE VISIT (OUTPATIENT)
Dept: URGENT CARE | Facility: URGENT CARE | Age: 34
End: 2019-10-09
Payer: COMMERCIAL

## 2019-10-09 ENCOUNTER — TELEPHONE (OUTPATIENT)
Dept: FAMILY MEDICINE | Facility: CLINIC | Age: 34
End: 2019-10-09

## 2019-10-09 VITALS
BODY MASS INDEX: 38.27 KG/M2 | HEART RATE: 95 BPM | OXYGEN SATURATION: 99 % | TEMPERATURE: 98.7 F | DIASTOLIC BLOOD PRESSURE: 102 MMHG | SYSTOLIC BLOOD PRESSURE: 143 MMHG | WEIGHT: 230 LBS

## 2019-10-09 DIAGNOSIS — J06.9 VIRAL UPPER RESPIRATORY TRACT INFECTION: ICD-10-CM

## 2019-10-09 DIAGNOSIS — S16.1XXA STRAIN OF NECK MUSCLE, INITIAL ENCOUNTER: Primary | ICD-10-CM

## 2019-10-09 PROCEDURE — 99213 OFFICE O/P EST LOW 20 MIN: CPT

## 2019-10-09 RX ORDER — CYCLOBENZAPRINE HCL 10 MG
10 TABLET ORAL 2 TIMES DAILY PRN
Qty: 10 TABLET | Refills: 0 | Status: SHIPPED | OUTPATIENT
Start: 2019-10-09 | End: 2023-11-13

## 2019-10-09 NOTE — TELEPHONE ENCOUNTER
"Sayda calls and says that she has bronchitis and was coughing so hard that she thinks she pulled a muscle in her neck, on the right side. Pt. Says that she has bad pain under her right ear. Pt. Says that she will go to the [facility] clinic now.    Reason for Disposition    [1] SEVERE neck pain (e.g., excruciating, unable to do any normal activities) AND [2] not improved after 2 hours of pain medicine    Additional Information    Negative: Shock suspected (e.g., cold/pale/clammy skin, too weak to stand, low BP, rapid pulse)    Negative: Difficult to awaken or acting confused (e.g., disoriented, slurred speech)    Negative: [1] Similar pain previously AND [2] it was from \"heart attack\"    Negative: [1] Similar pain previously AND [2] it was from \"angina\" AND [3] not relieved by nitroglycerin    Negative: Sounds like a life-threatening emergency to the triager    Negative: Followed a neck injury (e.g., MVA, sports, impact or collision)    Negative: Chest pain    Negative: Lymph node in the neck is swollen or painful to the touch    Negative: Sore throat is main symptom    Negative: Difficulty breathing or unusual sweating (e.g., sweating without exertion)    Negative: [1] Stiff neck (can't put chin to chest) AND [2] headache    Negative: [1] Stiff neck (can't put chin to chest) AND [2] fever    Negative: Weakness of an arm or hand    Negative: Problems with bowel or bladder control    Negative: Head is twisting to one side (or ask \"is it turning against your will?\")    Negative: Patient sounds very sick or weak to the triager    Protocols used: NECK PAIN OR [id number]      "

## 2019-10-09 NOTE — TELEPHONE ENCOUNTER
Reason for Call:  Other call back    Detailed comments: Sayda stated she is having neck pain and would like to take ibuprofen because Tylenol is not working. She is wondering if it is ok to take ibuprofen with the other medications she is taking. Please give Sayda a call back as soon as possible. Thank you!     Phone Number Patient can be reached at: Cell number on file:    Telephone Information:   Mobile [phone]       Best Time: today    Can we leave a detailed message on this number? YES    Call taken on 10/9/2019 at 4:02 PM by Radha Granados

## 2019-10-10 NOTE — TELEPHONE ENCOUNTER
Called patient and discussed questions regarding Ibuprofen.   Was seen in urgent care last evening and prescribed Flexeril.   Recommended heat, ice and gentle massage.   Recommended 1/2 dose of Flexeril to start (5 mg).   -[place], CNP

## 2019-10-10 NOTE — PROGRESS NOTES
SUBJECTIVE:   Sayda Valles is a 33 year old female presenting with a chief complaint of   Chief Complaint   Patient presents with     Urgent Care     Pharyngitis     sore throat,coughing so hard pulled a muscle up  neck behind right ear       She is an established patient of [facility].    URI Adult    Onset of symptoms was 1 day(s) ago.  Course of illness is worsening.    Severity moderate  Current and Associated symptoms: runny nose, cough - non-productive and sore throat  Treatment measures tried include Tylenol/Ibuprofen.  Predisposing factors include None.        Review of Systems   Constitutional: Negative for fever.   HENT: Negative for ear pain and sinus pain.    Respiratory: Negative for wheezing.    Cardiovascular: Negative for chest pain.   Musculoskeletal: Positive for neck pain.       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 Provider to review and confirm.     Family history of malignant neoplasm of breast     4 generations on her father's side     FAMILY HX BREAST MALIG      Heart palpitations 2/10    PVC's - dr sandhu     Migraine without aura, with intractable migraine, so stated, without mention of status migrainosus     sees neurologist     Mild persistent asthma      Non morbid obesity due to excess calories 5/9/2016     Simple goiter 11/25/2008    pt has no enlargement and had normal blood work-up at that time     Stroke (H) 06/15/2019    due to L vert artery dissection after chiropractor manipulation     Family History   Problem Relation Age of Onset     Lipids Father      Neurologic Disorder Father         epilepsy     Heart Disease Father 47        MI     Hypertension Father      Hyperlipidemia Father      Lipids  Sister      Hyperlipidemia Sister      Breast Cancer Paternal Grandmother         3rd generation      Breast Cancer Paternal Aunt      Diabetes Mother         gestational      Asthma Mother      Diabetes Paternal Grandfather      Other Cancer Maternal Grandfather         Skin     Hyperlipidemia Paternal Half-Sister      Breast Cancer Other      Other Cancer Maternal Grandmother         Lung     Ovarian Cancer Maternal Grandmother      Lung Cancer Maternal Grandmother      Diabetes Maternal Uncle      Diabetes Maternal Uncle      Colon Cancer No family hx of      Current Outpatient Medications   Medication Sig Dispense Refill     cyclobenzaprine (FLEXERIL) 10 MG tablet Take 1 tablet (10 mg) by mouth 2 times daily as needed for muscle spasms 10 tablet 0     acetaminophen (TYLENOL) 500 MG tablet Take 1,000 mg by mouth 2 times daily as needed        albuterol (PROAIR HFA/PROVENTIL HFA/VENTOLIN HFA) 108 (90 BASE) MCG/ACT Inhaler Inhale 1-2 puffs into the lungs every 4 hours as needed for shortness of breath / dyspnea or wheezing 1 Inhaler 1     aspirin (ASA) 81 MG EC tablet Take 1 tablet (81 mg) by mouth daily       Camphor-Menthol 0.2-3.5 % LIQD Externally apply 1 spray topically       clopidogrel (PLAVIX) 75 MG tablet Take 1 tablet (75 mg) by mouth daily 90 tablet 3     hydrOXYzine (ATARAX) 25 MG tablet Take 1 tablet (25 mg) by mouth nightly as needed for anxiety or other (insomnia) 30 tablet 1     loratadine (CLARITIN) 10 MG tablet Take 10 mg by mouth daily       metoprolol succinate ER (TOPROL-XL) 50 MG 24 hr tablet Take 1 tablet (50 mg) by mouth daily 30 tablet 3     Multiple Vitamins-Minerals (MULTIVITAMIN GUMMIES WOMENS) CHEW Smarty Pants Women's Complete       Social History     Tobacco Use     Smoking status: Former Smoker     Packs/day: 0.50     Years: 2.00     Pack years: 1.00     Types: Cigarettes     Start date:      Last attempt to quit: 2001     Years since quittin.7     Smokeless tobacco:  Never Used   Substance Use Topics     Alcohol use: Yes     Comment: 0-0.5 glasses of wine per month       OBJECTIVE  BP (!) 143/102   Pulse 95   Temp 98.7  F (37.1  C) (Oral)   Wt 104.3 kg (230 lb)   LMP 09/25/2019   SpO2 99%   BMI 38.27 kg/m      Physical Exam  Constitutional:       Appearance: Normal appearance.   HENT:      Head: Normocephalic.      Right Ear: Tympanic membrane normal.      Left Ear: Tympanic membrane normal.      Nose: Nose normal.   Eyes:      Conjunctiva/sclera: Conjunctivae normal.   Neck:      Musculoskeletal: Normal range of motion. Pain with movement and muscular tenderness present. No neck rigidity or spinous process tenderness.      Vascular: No carotid bruit.     Cardiovascular:      Rate and Rhythm: Normal rate and regular rhythm.   Pulmonary:      Breath sounds: Normal breath sounds.   Lymphadenopathy:      Cervical: No cervical adenopathy.   Neurological:      Mental Status: She is alert.         Labs:  No results found for this or any previous visit (from the past 24 hour(s)).    X-Ray was not done.    ASSESSMENT:      ICD-10-CM    1. Strain of neck muscle, initial encounter S16.1XXA cyclobenzaprine (FLEXERIL) 10 MG tablet   2. Viral upper respiratory tract infection J06.9         Medical Decision Making:    Differential Diagnosis:  URI Adult/Peds:  Influenza and Viral syndrome  MS Injury Pain: sprain    Serious Comorbid Conditions:  Adult:  None    PLAN:    MS Injury/Pain  ice, rest and Rx: flexeril    Followup:    If not improving or if condition worsens, follow up with your Primary Care Provider    There are no Patient Instructions on file for this visit.

## 2019-10-21 NOTE — TELEPHONE ENCOUNTER
Post Discharge Medication Reconciliation Status: discharge medications reconciled and changed, per note/orders (see AVS). See below encounter.

## 2019-10-30 ENCOUNTER — TELEPHONE (OUTPATIENT)
Dept: FAMILY MEDICINE | Facility: CLINIC | Age: 34
End: 2019-10-30

## 2019-10-30 NOTE — TELEPHONE ENCOUNTER
patient calling because she missed taking the medication today  Usually takes plavix, asa, metoprolol at 6 am   Will not be able to take them again until 6 pm.  Advised to take them when she gets home tonight, she can then take them at noon on Thursday and then friday back to the normal 6 am  patient verbalized understanding  patient also has appointment scheduled with provider tomorrow am and will discuss this with her as well.    Brandy DIAZRN BSN  [facility]  [phone]

## 2019-10-31 ENCOUNTER — TELEPHONE (OUTPATIENT)
Dept: NEUROSURGERY | Facility: CLINIC | Age: 34
End: 2019-10-31

## 2019-10-31 ENCOUNTER — OFFICE VISIT (OUTPATIENT)
Dept: FAMILY MEDICINE | Facility: CLINIC | Age: 34
End: 2019-10-31
Payer: COMMERCIAL

## 2019-10-31 VITALS
WEIGHT: 233 LBS | SYSTOLIC BLOOD PRESSURE: 120 MMHG | TEMPERATURE: 98 F | BODY MASS INDEX: 38.82 KG/M2 | DIASTOLIC BLOOD PRESSURE: 80 MMHG | OXYGEN SATURATION: 99 % | HEIGHT: 65 IN | HEART RATE: 99 BPM

## 2019-10-31 DIAGNOSIS — I10 BENIGN ESSENTIAL HYPERTENSION: ICD-10-CM

## 2019-10-31 PROCEDURE — 99214 OFFICE O/P EST MOD 30 MIN: CPT | Performed by: NURSE PRACTITIONER

## 2019-10-31 RX ORDER — METOPROLOL SUCCINATE 50 MG/1
50 TABLET, EXTENDED RELEASE ORAL DAILY
Qty: 30 TABLET | Refills: 3 | Status: SHIPPED | OUTPATIENT
Start: 2019-10-31 | End: 2020-03-04

## 2019-10-31 ASSESSMENT — ENCOUNTER SYMPTOMS
WHEEZING: 0
NECK PAIN: 1
SINUS PAIN: 0
FEVER: 0

## 2019-10-31 ASSESSMENT — MIFFLIN-ST. JEOR: SCORE: 1752.76

## 2019-10-31 NOTE — TELEPHONE ENCOUNTER
Spoke with Dr. Glass. He stated he will call the patient this afternoon to explain tests and answers questions. With current symptoms stated provided reassurance.   Patient aware any changes to  Be seen, but thankful Dr. Glass plans to call this afternoon. No further questions.

## 2019-10-31 NOTE — TELEPHONE ENCOUNTER
LOV with Dr. Glass 07/11/2019 for post bilateral cerebellar and small right parietal ischemic stroke due to vertebral dissection per Dr. Glass's office visit note.  Patient calling with questions after reading a test result note. She states she was told by Dr. Glass the artery dissection was done on her left artery. She looked at notes and it says they did a right vertebral artery dissection in addition and that she was never told about the right side.   She also read that there was a thyroid nodule noted in the report and wanted to be sure this was not of concern as she was not aware.  She updated nursing that she saw her PCP today, who she has been following for blood pressure control. Her blood pressure medication was recently increased.  She has been having headaches the last 5-6 days. Described as tension headaches on lower right side of her head in the back. Tender to touch. Intermittent, daily for 5-6 days rates 5-6/10. Not a crushing or worst headache she has ever had type feeling, Tylenol provides relief.   She relayed she had this type of headache on her left side after her stroke, and she stated at that time they were not concerned and did not think it was related.   Needs to schedule cerebral angiogram -- Dr. Glass recommended a cerebral angiogram within 1-2 months post visit. She has not gotten it yet- but nursing provided phone number to get scheduled.  Will discuss with Dr. Glass and get clarification and recommendations. Advised if she has any red flag symptoms including sudden painful headache or other new symptoms to be seen.

## 2019-10-31 NOTE — PROGRESS NOTES
Subjective     Sayda Valles is a 34 year old female who presents to clinic today for the following health issues:    HPI   Hypertension Follow-up      Do you check your blood pressure regularly outside of the clinic? Yes, has been getting HA's last 5-6 days- usually in the evening- tender lower back right side of head. Currently has HA, not drinking a lot of water.  X 5 days  BP was 126/ 87 last recent BP check.      Are you following a low salt diet? No    Are your blood pressures ever more than 140 on the top number (systolic) OR more   than 90 on the bottom number (diastolic), for example 140/90? No      How many servings of fruits and vegetables do you eat daily?  2-3    On average, how many sweetened beverages do you drink each day (soda, juice, sweet tea, etc)?   2-3    How many days per week do you miss taking your medication? 0    Has been consistently taking metoprolol without side effects. She reported it improved the blood rushing feeling in her head and palpitations but recently is starting to experience blood rushing feeling again a couple times in the past few weeks. BP has been stable at home on recheck. Has started experiencing headaches the last week on the right back of the head with subsequent tenderness. Today has a headache but it is frontotemporal. Has significant history for stroke and left dissecting vertebral artery. Was supposed to get a cerebral angiogram done with a neurology appointment but reports anxiety over the results and surgery has prevented her from scheduling it. She says she will get it done by year end because her insurance will fully cover it until the new year.    Patient Active Problem List   Diagnosis     Allergic rhinitis     Varicose Veins of Legs- painful with standing     Abnormal Mammogram- 2/2006 - prob benign      Heart palpitations - since 2010 intermittent. Reports multiple work-ups with unclear cause. Followed by cardiology.     CARDIOVASCULAR SCREENING; LDL GOAL  LESS THAN 160     Anxiety     Migraine with aura and without status migrainosus, not intractable - since ; stable.     Mild persistent asthma without complication     Bilateral ankle pain     Family history of breast cancer - paternal grandma, paternal aunt and paternal great-grandmother. Dad completed genetic screening which was neg.      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)     Borderline Aortic root enlargement (H)     Past Surgical History:   Procedure Laterality Date     NO HISTORY OF SURGERY         Social History     Tobacco Use     Smoking status: Former Smoker     Packs/day: 0.50     Years: 2.00     Pack years: 1.00     Types: Cigarettes     Start date:      Last attempt to quit: 2001     Years since quittin.7     Smokeless tobacco: Never Used   Substance Use Topics     Alcohol use: Yes     Comment: 0-0.5 glasses of wine per month     Family History   Problem Relation Age of Onset     Lipids Father      Neurologic Disorder Father         epilepsy     Heart Disease Father 47        MI     Hypertension Father      Hyperlipidemia Father      Lipids Sister      Hyperlipidemia Sister      Breast Cancer Paternal Grandmother         3rd generation      Breast Cancer Paternal Aunt      Diabetes Mother         gestational      Asthma Mother      Diabetes Paternal Grandfather      Other Cancer Maternal Grandfather         Skin     Hyperlipidemia Paternal Half-Sister      Breast Cancer Other      Other Cancer Maternal Grandmother         Lung     Ovarian Cancer Maternal Grandmother      Lung Cancer Maternal Grandmother      Diabetes Maternal Uncle       Diabetes Maternal Uncle      Colon Cancer No family hx of          Current Outpatient Medications   Medication Sig Dispense Refill     metoprolol succinate ER (TOPROL-XL) 50 MG 24 hr tablet Take 1 tablet (50 mg) by mouth daily 30 tablet 3     acetaminophen (TYLENOL) 500 MG tablet Take 1,000 mg by mouth 2 times daily as needed        albuterol (PROAIR HFA/PROVENTIL HFA/VENTOLIN HFA) 108 (90 BASE) MCG/ACT Inhaler Inhale 1-2 puffs into the lungs every 4 hours as needed for shortness of breath / dyspnea or wheezing 1 Inhaler 1     aspirin (ASA) 81 MG EC tablet Take 1 tablet (81 mg) by mouth daily       Camphor-Menthol 0.2-3.5 % LIQD Externally apply 1 spray topically       clopidogrel (PLAVIX) 75 MG tablet Take 1 tablet (75 mg) by mouth daily 90 tablet 3     cyclobenzaprine (FLEXERIL) 10 MG tablet Take 1 tablet (10 mg) by mouth 2 times daily as needed for muscle spasms 10 tablet 0     hydrOXYzine (ATARAX) 25 MG tablet Take 1 tablet (25 mg) by mouth nightly as needed for anxiety or other (insomnia) 30 tablet 1     loratadine (CLARITIN) 10 MG tablet Take 10 mg by mouth daily       Multiple Vitamins-Minerals (MULTIVITAMIN GUMMIES WOMENS) CHEW Smarty Pants Women's Complete       BP Readings from Last 3 Encounters:   10/31/19 120/80   10/09/19 (!) 143/102   10/01/19 136/88    Wt Readings from Last 3 Encounters:   10/31/19 105.7 kg (233 lb)   10/09/19 104.3 kg (230 lb)   10/01/19 102.5 kg (226 lb)               Reviewed and updated as needed this visit by Provider         Review of Systems   ROS COMP: CONSTITUTIONAL: NEGATIVE for fever, chills, change in weight  EYES: NEGATIVE for vision changes or irritation  ENT/MOUTH: NEGATIVE for ear, mouth and throat problems  RESP: NEGATIVE for significant cough or SOB  CV: NEGATIVE for chest pain or peripheral edema, Still having palpitations but less frequently  GI: NEGATIVE for nausea, abdominal pain, heartburn, or change in bowel habits  NEURO: NEGATIVE for weakness, dizziness or  "paresthesias, Positive for headaches in the posterior cerebrum  ENDOCRINE: NEGATIVE for temperature intolerance, skin/hair changes  PSYCHIATRIC: NEGATIVE for changes in mood or affect      Objective    /80   Pulse 99   Temp 98  F (36.7  C) (Oral)   Ht 1.651 m (5' 5\")   Wt 105.7 kg (233 lb)   SpO2 99%   BMI 38.77 kg/m    Body mass index is 38.77 kg/m .  Physical Exam   GENERAL: healthy, alert and no acute distress  EYES: Eyes grossly normal to inspection, PERRL and conjunctivae and sclerae normal, EOM intact  NECK: no adenopathy, no asymmetry, masses, or scars  RESP: lungs clear to auscultation - no rales, rhonchi or wheezes  CV: regular rate and rhythm, normal S1 S2, no S3 or S4, no murmur, click or rub, no peripheral edema   NEURO: Normal strength and tone, mentation intact and speech normal, CN 2-12 intact  PSYCH: mentation appears normal, affect normal/bright            Assessment & Plan     1. Benign essential hypertension    Stable blood pressure with new onset headaches with a history of dissecting cerebral artery. Cardiology wants well controlled blood pressure and recommended metoprolol 50 mg. Patient was only willing to take 25 mg initially, but is now open to taking 50mg. Will prescribe the 50 mg of metoprolol and have patient check blood pressure at home with the goal of staying under 130/90. The thought is she is having increased blood pressure creating these headaches. Also strongly encouraged getting her cerebral angiogram and following up with neurology as previously recommended.   - metoprolol succinate ER (TOPROL-XL) 50 MG 24 hr tablet; Take 1 tablet (50 mg) by mouth daily  Dispense: 30 tablet; Refill: 3       Follow up in 3 months after she has completed follow up with neurosurgery.      The visit was 25 minutes > 1/2 of the visit was spent in counseling and coordination of care.     Rowan Torres RN, Student FNP, [employer]    History and physical examination done with student nurse " practitioner.  Student acted as scribe for this encounter.  I agree with assessment and plan for this patient.     CHE Rueda [facility]

## 2019-11-03 ENCOUNTER — HEALTH MAINTENANCE LETTER (OUTPATIENT)
Age: 34
End: 2019-11-03

## 2019-11-07 NOTE — TELEPHONE ENCOUNTER
Left detailed message with patient informing her that Dr. Glass will have some availability in IR the week of November 18th. I asked that patient call IR at [phone] to get her angiogram scheduled.

## 2019-12-04 ENCOUNTER — TRANSFERRED RECORDS (OUTPATIENT)
Dept: HEALTH INFORMATION MANAGEMENT | Facility: CLINIC | Age: 34
End: 2019-12-04

## 2019-12-12 ENCOUNTER — TRANSFERRED RECORDS (OUTPATIENT)
Dept: HEALTH INFORMATION MANAGEMENT | Facility: CLINIC | Age: 34
End: 2019-12-12

## 2019-12-20 ENCOUNTER — TELEPHONE (OUTPATIENT)
Dept: FAMILY MEDICINE | Facility: CLINIC | Age: 34
End: 2019-12-20

## 2019-12-20 NOTE — TELEPHONE ENCOUNTER
Acetaminophen, 500-1,000 mg every 6 hours as needed.    At times adding some caffeine with the Acetaminophen can be helpful for headaches.    Of course, with no improvement or worsening of symptoms plan follow-up in urgent care.    - [place], CNP

## 2019-12-20 NOTE — TELEPHONE ENCOUNTER
Reason for Call:  Other call back    Detailed comments: Pt states she has a bad headache.  Pt is on blood thinner so wondering what she is able to take.    Phone Number Patient can be reached at: Home number on file [phone] (home)    Best Time: anytime    Can we leave a detailed message on this number? YES    Call taken on 12/20/2019 at 2:18 PM by Nikki Orantes

## 2019-12-20 NOTE — TELEPHONE ENCOUNTER
Brittany advised Tylenol 500-100 mg and okay to add some caffeine.Sayda does not want to do this because she stopped caffeine, and has been taking Tylenol with little relief. Advised to try massage balls on shoulders, area of tension, heat, aromatherapy, etc as other alternatives. She sees neurology in early January, will follow up since she has been having these headaches off and on. She feels they are tension headaches and may try a small amount of caffeine to see if it helps today. Was also recently prescribed muscle relaxants and may take one at bedtime as she doesn't like taking them but it helped her before.     Left lower headache off and on for last 3 weeks. Has a pressure headache now at the top.     Rama Heaton R.N.

## 2020-01-08 ENCOUNTER — TRANSFERRED RECORDS (OUTPATIENT)
Dept: HEALTH INFORMATION MANAGEMENT | Facility: CLINIC | Age: 35
End: 2020-01-08

## 2020-01-10 ENCOUNTER — TELEPHONE (OUTPATIENT)
Dept: FAMILY MEDICINE | Facility: CLINIC | Age: 35
End: 2020-01-10

## 2020-01-10 DIAGNOSIS — E04.1 THYROID NODULE: Primary | ICD-10-CM

## 2020-01-10 NOTE — TELEPHONE ENCOUNTER
Reason for Call:  Other Thyroid    Detailed comments: Patient wants to discuss having an US on her thyroid. Please call her back to triage.   Her Neurologist told her to check with her PCP.    Phone Number Patient can be reached at: Cell number on file:    Telephone Information:   Mobile [phone]       Best Time: any    Can we leave a detailed message on this number? YES    Call taken on 1/10/2020 at 12:06 PM by Marycarmen Ojeda

## 2020-01-10 NOTE — TELEPHONE ENCOUNTER
Thyroid ultrasound order placed.  Patient may proceed with ultrasound and I will follow-up regarding results once available. - [place], CNP

## 2020-01-10 NOTE — TELEPHONE ENCOUNTER
Returned call to patient.  Patient stated she was seen by Neurology yesterday and found an Addendum to CT results from 7/12/19 there is a right thyroid nodule that is unchanged from previous CT, however, the previous CT was just 1 month prior (and per patient, neurologist was not comfortable with the addendum statement).  Per patient she was advised by Neurology to follow up with PCP to possibly have an US completed since this is a new nodule found incidentally.    Routing to provider for review and advise as appropriate.    ESTELA AlvaradoN, RN  [employer] [facility]

## 2020-01-10 NOTE — TELEPHONE ENCOUNTER
Called pt and let her know US was ordered.  Transferred call to imaging to schedule appt.  Iram Anna

## 2020-01-13 ENCOUNTER — HOSPITAL ENCOUNTER (OUTPATIENT)
Dept: ULTRASOUND IMAGING | Facility: CLINIC | Age: 35
Discharge: HOME | End: 2020-01-13
Attending: NURSE PRACTITIONER | Admitting: NURSE PRACTITIONER
Payer: COMMERCIAL

## 2020-01-13 DIAGNOSIS — E04.1 THYROID NODULE: ICD-10-CM

## 2020-01-13 PROCEDURE — 76536 US EXAM OF HEAD AND NECK: CPT

## 2020-01-14 ENCOUNTER — OFFICE VISIT (OUTPATIENT)
Dept: FAMILY MEDICINE | Facility: CLINIC | Age: 35
End: 2020-01-14
Payer: COMMERCIAL

## 2020-01-14 VITALS
WEIGHT: 233 LBS | OXYGEN SATURATION: 100 % | HEART RATE: 95 BPM | SYSTOLIC BLOOD PRESSURE: 122 MMHG | DIASTOLIC BLOOD PRESSURE: 88 MMHG | HEIGHT: 65 IN | BODY MASS INDEX: 38.82 KG/M2 | TEMPERATURE: 98 F

## 2020-01-14 DIAGNOSIS — E04.1 THYROID NODULE: Primary | ICD-10-CM

## 2020-01-14 PROCEDURE — 86800 THYROGLOBULIN ANTIBODY: CPT | Performed by: NURSE PRACTITIONER

## 2020-01-14 PROCEDURE — 86376 MICROSOMAL ANTIBODY EACH: CPT | Performed by: NURSE PRACTITIONER

## 2020-01-14 PROCEDURE — 84443 ASSAY THYROID STIM HORMONE: CPT | Performed by: NURSE PRACTITIONER

## 2020-01-14 PROCEDURE — 99214 OFFICE O/P EST MOD 30 MIN: CPT | Performed by: NURSE PRACTITIONER

## 2020-01-14 PROCEDURE — 36415 COLL VENOUS BLD VENIPUNCTURE: CPT | Performed by: NURSE PRACTITIONER

## 2020-01-14 ASSESSMENT — MIFFLIN-ST. JEOR: SCORE: 1752.76

## 2020-01-14 NOTE — PROGRESS NOTES
Subjective     Sayda Valles is a 34 year old female who presents to clinic today for the following health issues:    HPI     Review Thyroid Result.    Consultation with aneurysm specialist at [facility].   History of incidental thyroid nodule on CT of head/neck.      TSH   Date Value Ref Range Status   01/14/2020 1.04 0.40 - 4.00 mU/L Final   09/19/2018 1.08 0.40 - 4.00 mU/L Final   05/15/2017 0.72 0.40 - 4.00 mU/L Final   07/18/2016 0.84 0.40 - 4.00 mU/L Final   03/11/2016 0.64 0.40 - 4.00 mU/L Final     Has noticed weight gain, no changes in diet or exercise.       Patient Active Problem List   Diagnosis     Allergic rhinitis     Varicose Veins of Legs- painful with standing     Abnormal Mammogram- 2/2006 - prob benign      Heart palpitations - since 2010 intermittent. Reports multiple work-ups with unclear cause. Followed by cardiology.     CARDIOVASCULAR SCREENING; LDL GOAL LESS THAN 160     Anxiety     Migraine with aura and without status migrainosus, not intractable - since 2003; stable.     Mild persistent asthma without complication     Bilateral ankle pain     Family history of breast cancer - paternal grandma, paternal aunt and paternal great-grandmother. Dad completed genetic screening which was neg.      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)     Borderline Aortic root enlargement (H)     Past Surgical History:   Procedure Laterality Date     NO HISTORY OF SURGERY         Social History     Tobacco Use     Smoking status: Former Smoker     Packs/day: 0.50     Years: 2.00     Pack years: 1.00     Types: Cigarettes     Start date: 1999     Last attempt to quit:  2001     Years since quittin.9     Smokeless tobacco: Never Used   Substance Use Topics     Alcohol use: Yes     Comment: 0-0.5 glasses of wine per month     Family History   Problem Relation Age of Onset     Lipids Father      Neurologic Disorder Father         epilepsy     Heart Disease Father 47        MI     Hypertension Father      Hyperlipidemia Father      Lipids Sister      Hyperlipidemia Sister      Breast Cancer Paternal Grandmother         3rd generation      Breast Cancer Paternal Aunt      Diabetes Mother         gestational      Asthma Mother      Diabetes Paternal Grandfather      Other Cancer Maternal Grandfather         Skin     Hyperlipidemia Paternal Half-Sister      Breast Cancer Other      Other Cancer Maternal Grandmother         Lung     Ovarian Cancer Maternal Grandmother      Lung Cancer Maternal Grandmother      Diabetes Maternal Uncle      Diabetes Maternal Uncle      Colon Cancer No family hx of          Current Outpatient Medications   Medication Sig Dispense Refill     acetaminophen (TYLENOL) 500 MG tablet Take 1,000 mg by mouth 2 times daily as needed        albuterol (PROAIR HFA/PROVENTIL HFA/VENTOLIN HFA) 108 (90 BASE) MCG/ACT Inhaler Inhale 1-2 puffs into the lungs every 4 hours as needed for shortness of breath / dyspnea or wheezing 1 Inhaler 1     aspirin (ASA) 81 MG EC tablet Take 1 tablet (81 mg) by mouth daily       Camphor-Menthol 0.2-3.5 % LIQD Externally apply 1 spray topically       cyclobenzaprine (FLEXERIL) 10 MG tablet Take 1 tablet (10 mg) by mouth 2 times daily as needed for muscle spasms 10 tablet 0     hydrOXYzine (ATARAX) 25 MG tablet Take 1 tablet (25 mg) by mouth nightly as needed for anxiety or other (insomnia) 30 tablet 1     loratadine (CLARITIN) 10 MG tablet Take 10 mg by mouth daily       metoprolol succinate ER (TOPROL-XL) 50 MG 24 hr tablet Take 1 tablet (50 mg) by mouth daily 30 tablet 3     Multiple Vitamins-Minerals (MULTIVITAMIN GUMMIES WOMENS)  "CHEW Smarty Pants Women's Complete       Allergies   Allergen Reactions     Zithromax [Azithromycin Dihydrate] GI Disturbance       Reviewed and updated as needed this visit by Provider         Review of Systems   ROS COMP: Constitutional, HEENT, cardiovascular, pulmonary, gi and gu systems are negative, except as otherwise noted.      Objective    /88   Pulse 95   Temp 98  F (36.7  C) (Oral)   Ht 1.651 m (5' 5\")   Wt 105.7 kg (233 lb)   SpO2 100%   BMI 38.77 kg/m    Body mass index is 38.77 kg/m .  Physical Exam   GENERAL: healthy, alert and no distress  NECK: no adenopathy, no asymmetry, masses, or scars and thyroid normal to palpation  RESP: lungs clear to auscultation - no rales, rhonchi or wheezes  CV: regular rate and rhythm, normal S1 S2, no S3 or S4, no murmur  PSYCH: mentation appears normal, anxious, judgement and insight intact and appearance well groomed        Assessment & Plan     Sayda was seen today for results.    Diagnoses and all orders for this visit:    Thyroid nodule  Thyroid ultrasound completed on 1/13/20.    Thyroid nodules as follows:  1. 0.6 x 0.5 x 0.3 cm complex cystic nodule right lower thyroid.  2. 2.2 x 2.1 x 1.3 cm hypoechoic heterogeneous solid left lower  thyroid nodule. Microcalcifications noted. Peripheral  hypervascularity.                                                IMPRESSION: Thyroid nodules as above. Nodule two at the lower left  thyroid appears solid, heterogeneous, and appears to contain  microcalcifications. Consider image-guided biopsy.  -     US Biopsy Thyroid Fine Needle Aspiration; Future  -     TSH with free T4 reflex  -     Thyroid peroxidase antibody  -     Anti thyroglobulin antibody  Results for orders placed or performed in visit on 01/14/20   TSH with free T4 reflex     Status: None   Result Value Ref Range    TSH 1.04 0.40 - 4.00 mU/L   Thyroid peroxidase antibody     Status: None   Result Value Ref Range    Thyroid Peroxidase Antibody <10 <35 " IU/mL   Anti thyroglobulin antibody     Status: None   Result Value Ref Range    Thyroglobulin Antibody <20 <40 IU/mL       Return in about 2 weeks (around 1/28/2020) for endocrinology consultation after FNA.    The visit was 25 minutes > 1/2 of the visit was spent in counseling and coordination of care regarding thyroid nodules.      Brittany Miles, [facility]

## 2020-01-14 NOTE — RESULT ENCOUNTER NOTE
Please call with results, [employer] message sent as well:      Dear Sayda,     Your recent thyroid ultrasound showed thyroid nodules on the right and left side of your thyroid.  I recommend consultation with endocrinology regarding possible biopsy of the nodules.      See below for radiologist's interpretation:      Thyroid nodules as follows:  1. 0.6 x 0.5 x 0.3 cm complex cystic nodule right lower thyroid.  2. 2.2 x 2.1 x 1.3 cm hypoechoic heterogeneous solid left lower  thyroid nodule. Microcalcifications noted. Peripheral  hypervascularity.    IMPRESSION: Thyroid nodules as above. Nodule two at the lower left  thyroid appears solid, heterogeneous, and appears to contain  microcalcifications. Consider image-guided biopsy.      Here is the contact information for endocrinology (3 different options):      FMG: [facility] [phone]   [email or web address]    FMG:  [facility]  [phone]  [email or web address]    FHN: Endocrinology Clinic [facility] [phone]   [email or web address]        Please send a [employer] message or call [phone]  if you have any questions.      CHE Rueda, CNP  [facility]    If you have further questions about the interpretation of your labs, [email or web address] is a good website to check out for further information.

## 2020-01-14 NOTE — PATIENT INSTRUCTIONS
Patient Education     Thyroid Fine Needle Aspiration (FNA) Biopsy    The thyroid is a gland at the front of the neck. A thyroid fine needle aspiration (FNA) biopsy is a procedure to remove a small piece of tissue from your thyroid gland. The tissue is removed with a small, hollow needle. The sample is sent to a lab to be examined to find a diagnosis.  Why thyroid FNA biopsy is done  Hard nodules can sometimes form inside the thyroid gland. You may notice a small bump in the gland area. Thyroid nodules are common. The nodules are often not dangerous. But in some cases they can be thyroid cancer. A thyroid FNA biopsy can test for cancer.  Risks of thyroid FNA biopsy  All procedures have some risks. The risks of thyroid FNA biopsy include:    Bleeding at the biopsy site    Infection    Damage to areas near the thyroid (rare)    Need for a repeat biopsy if the test result is in doubt  Getting ready for your procedure  Talk with your healthcare provider how to get ready. Tell him or her about all the medicines you take. This includes over-the-counter medicines such as ibuprofen. It also includes vitamins, herbs, and other supplements. You may need to stop taking some medicines before the procedure, such as blood thinners and aspirin. You should be able to eat and drink normally before the procedure.  On the day of your procedure  Your procedure may be done in a hospital or a medical clinic. You should be able to go home the same day. A typical procedure goes like this:    You may be given a medicine to help you relax, if needed.          A local anesthetic (numbing medicine) may be injected in the area in the front of your neck. Because the biopsy needle is so small, this may not be needed.    Your healthcare provider may use an ultrasound machine during the biopsy. This machine uses sound waves and a computer to show live images of the tissues in your neck. This helps your healthcare provider guide the needle to the  right spot. A gel will be put on your neck to help the ultrasound wand maintain contact with your skin and enhance the signal generated by the sound waves.     The biopsy area will be cleaned. A thin, fine (narrow) needle will be put through your skin and into your thyroid gland. You may feel a small amount of pain or pressure. The needle will be gently pushed into the nodule. A syringe attached to the needle will use gentle suction to remove a small piece of tissue from the nodule. This process may be repeated several times in the same nodule to get different samples from all parts of the nodule. You will need to be very still and not cough, talk, or swallow while the needle is put in.     The needle will then be removed. A small bandage will be put on the needle insertion site. The tissue will be sent to a pathology lab to be looked at for signs of cancer.    Sometimes the healthcare provider will use the ultrasound want again 15 to 30 minutes later to be sure there is no bleeding or swelling where the biopsy was performed.  After your procedure  You ll likely be able to go home that day and can go back to your normal activities right away. You can remove your bandage within a few hours.  The site of the biopsy may be sore for a day or two after the procedure. You can take over-the-counter pain medicine such as acetaminophen as needed. Follow any other instructions that your healthcare provider gives you.  Follow-up care  Ask your healthcare provider when to expect to get your results from the biopsy. It may take several days. In some cases, the biopsy result may be inconclusive. This means the lab can t be sure if your nodule is cancer. You may need a repeat biopsy or surgery.  If your thyroid nodule is not cancer, you may not need any treatment. Your healthcare provider may want to keep track of your nodule. You may need another biopsy in the future.  If your nodule is cancer, you may need surgery or other  treatment. Your healthcare provider will tell you more about what to expect and what needs to be done next.  When to call your healthcare provider  Call your healthcare provider right away if you have any of the following:    Fever of 100.4 F (38 C) or higher    Redness, swelling, bleeding, or fluid leaking from the biopsy site    Pain around the biopsy site that gets worse  Be sure you know how to reach your healthcare provider after office hours and on weekends and holidays.  Date Last Reviewed: 6/1/2018 2000-2019 [employer]. [address] [place] [address]. All rights reserved. This information is not intended as a substitute for professional medical care. Always follow your healthcare professional's instructions.         [facility] [phone]      [facility], [address] [address]  Phone: [phone]

## 2020-01-15 LAB
THYROGLOB AB SERPL IA-ACNC: <20 IU/ML (ref 0–40)
THYROPEROXIDASE AB SERPL-ACNC: <10 IU/ML
TSH SERPL DL<=0.005 MIU/L-ACNC: 1.04 MU/L (ref 0.4–4)

## 2020-01-16 NOTE — RESULT ENCOUNTER NOTE
Dear Sayda,     -TSH (thyroid stimulating hormone) level is normal which indicates normal thyroid function.  -Normal thyroid antibodies.        Please send a [employer] message or call [phone]  if you have any questions.      CHE Rueda, CNP  [facility]    If you have further questions about the interpretation of your labs, [email or web address] is a good website to check out for further information.

## 2020-01-20 ENCOUNTER — HOSPITAL ENCOUNTER (OUTPATIENT)
Dept: ULTRASOUND IMAGING | Facility: CLINIC | Age: 35
Discharge: HOME | End: 2020-01-20
Attending: NURSE PRACTITIONER | Admitting: NURSE PRACTITIONER
Payer: COMMERCIAL

## 2020-01-20 DIAGNOSIS — E04.1 THYROID NODULE: ICD-10-CM

## 2020-01-20 PROCEDURE — 10005 FNA BX W/US GDN 1ST LES: CPT

## 2020-01-20 PROCEDURE — 00000102 ZZHCL STATISTIC CYTO WRIGHT STAIN TC: Performed by: PHYSICIAN ASSISTANT

## 2020-01-20 PROCEDURE — 88173 CYTOPATH EVAL FNA REPORT: CPT | Mod: 26 | Performed by: PHYSICIAN ASSISTANT

## 2020-01-20 PROCEDURE — 88173 CYTOPATH EVAL FNA REPORT: CPT | Performed by: PHYSICIAN ASSISTANT

## 2020-01-20 NOTE — PROGRESS NOTES
Patient tolerated left thyroid nodule biopsy well by Bertha HAMEED.  Samples obtained and brought to lab.  Dressing applied and remains clean, dry and intact at time of discharge.  Patient states understanding and home per friend.  Ronel Leung, RN, BSN

## 2020-01-20 NOTE — PROCEDURES
[facility]    Procedure: Left thyroid nodule biopsy  Date/Time: 1/20/2020 2:32 PM  Performed by: Bertha Pereira PA-C  Authorized by: Bertha Pereira PA-C     UNIVERSAL PROTOCOL   Site Marked: Yes  Prior Images Obtained and Reviewed:  Yes  Required items: Required blood products, implants, devices and special equipment available    Patient identity confirmed:  Verbally with patient  NA - No sedation, light sedation, or local anesthesia  Confirmation Checklist:  Patient's identity using two indicators, relevant allergies, procedure was appropriate and matched the consent or emergent situation and correct equipment/implants were available  Time out: Immediately prior to the procedure a time out was called    Universal Protocol: the Joint Commission Universal Protocol was followed    Preparation: Patient was prepped and draped in usual sterile fashion          SEDATION    Patient Sedated: No    See dictated procedure note for full details.  PROCEDURE   Patient Tolerance:  Patient tolerated the procedure well with no immediate complications  Describe Procedure: Fine needle aspiration biopsy of left thyroid nodule  Length of time physician/provider present for 1:1 monitoring during sedation: 0

## 2020-01-21 LAB — COPATH REPORT: NORMAL

## 2020-01-21 NOTE — RESULT ENCOUNTER NOTE
Reviewed results with patient via telephone.        CHE Rueda, CNP  [facility]

## 2020-03-02 DIAGNOSIS — I10 BENIGN ESSENTIAL HYPERTENSION: ICD-10-CM

## 2020-03-02 NOTE — TELEPHONE ENCOUNTER
"Requested Prescriptions   Pending Prescriptions Disp Refills     metoprolol succinate ER (TOPROL-XL) 50 MG 24 hr tablet [Pharmacy Med Name: METOPROLOL SUCC ER 50 MG TA 50 TAB]  Last Written Prescription Date:  10/31/2019  Last Fill Quantity: 30 tablet,  # refills: 3   Last office visit: 1/14/2020 with prescribing provider:  Ginger     Future Office Visit:       30 tablet 3     Sig: TAKE 1 TABLET (50 MG) BY MOUTH DAILY       Beta-Blockers Protocol Passed - 3/2/2020  9:19 AM        Passed - Blood pressure under 140/90 in past 12 months     BP Readings from Last 3 Encounters:   01/14/20 122/88   10/31/19 120/80   10/09/19 (!) 143/102             Passed - Patient is age 6 or older        Passed - Recent (12 mo) or future (30 days) visit within the authorizing provider's specialty     Patient has had an office visit with the authorizing provider or a provider within the authorizing providers department within the previous 12 mos or has a future within next 30 days. See \"Patient Info\" tab in inbasket, or \"Choose Columns\" in Meds & Orders section of the refill encounter.              Passed - Medication is active on med list        "

## 2020-03-04 RX ORDER — METOPROLOL SUCCINATE 50 MG/1
50 TABLET, EXTENDED RELEASE ORAL DAILY
Qty: 90 TABLET | Refills: 0 | Status: SHIPPED | OUTPATIENT
Start: 2020-03-04 | End: 2020-03-05

## 2020-03-04 NOTE — TELEPHONE ENCOUNTER
Prescription approved per [facility] Refill Protocol.    Suyapa Yanes RN, BSN  [facility]

## 2020-03-05 ENCOUNTER — OFFICE VISIT (OUTPATIENT)
Dept: FAMILY MEDICINE | Facility: CLINIC | Age: 35
End: 2020-03-05
Payer: COMMERCIAL

## 2020-03-05 VITALS
DIASTOLIC BLOOD PRESSURE: 86 MMHG | HEIGHT: 65 IN | BODY MASS INDEX: 38.49 KG/M2 | OXYGEN SATURATION: 99 % | TEMPERATURE: 98.5 F | HEART RATE: 88 BPM | WEIGHT: 231 LBS | SYSTOLIC BLOOD PRESSURE: 134 MMHG

## 2020-03-05 DIAGNOSIS — I10 BENIGN ESSENTIAL HYPERTENSION: ICD-10-CM

## 2020-03-05 DIAGNOSIS — H65.93 OME (OTITIS MEDIA WITH EFFUSION), BILATERAL: ICD-10-CM

## 2020-03-05 DIAGNOSIS — R42 DIZZINESS: Primary | ICD-10-CM

## 2020-03-05 PROCEDURE — 99214 OFFICE O/P EST MOD 30 MIN: CPT | Performed by: NURSE PRACTITIONER

## 2020-03-05 RX ORDER — METOPROLOL SUCCINATE 50 MG/1
50 TABLET, EXTENDED RELEASE ORAL DAILY
Qty: 30 TABLET | Refills: 3 | OUTPATIENT
Start: 2020-03-05

## 2020-03-05 RX ORDER — METOPROLOL SUCCINATE 50 MG/1
50 TABLET, EXTENDED RELEASE ORAL DAILY
Qty: 90 TABLET | Refills: 3 | Status: SHIPPED | OUTPATIENT
Start: 2020-03-05 | End: 2020-09-07

## 2020-03-05 ASSESSMENT — MIFFLIN-ST. JEOR: SCORE: 1743.69

## 2020-03-05 NOTE — TELEPHONE ENCOUNTER
"Requested Prescriptions   Pending Prescriptions Disp Refills     metoprolol succinate ER (TOPROL-XL) 50 MG 24 hr tablet [Pharmacy Med Name: METOPROLOL SUCC ER 50 MG TA 50 TAB] 30 tablet 3     Sig: TAKE 1 TABLET (50 MG) BY MOUTH DAILY       Beta-Blockers Protocol Passed - 3/5/2020  8:38 AM        Passed - Blood pressure under 140/90 in past 12 months     BP Readings from Last 3 Encounters:   03/05/20 134/86   01/14/20 122/88   10/31/19 120/80                 Passed - Patient is age 6 or older        Passed - Recent (12 mo) or future (30 days) visit within the authorizing provider's specialty     Patient has had an office visit with the authorizing provider or a provider within the authorizing providers department within the previous 12 mos or has a future within next 30 days. See \"Patient Info\" tab in inbasket, or \"Choose Columns\" in Meds & Orders section of the refill encounter.              Passed - Medication is active on med list        Duplicate.  Patient seen by different provider today, rx sent.    ESTELA AlvaradoN, RN  [employer] [facility]    "

## 2020-03-05 NOTE — PROGRESS NOTES
"Subjective   Sayda Valles is a 34 year old female who presents to clinic today for the following health issues:    HPI   Dizziness  Onset: x 4 days     Description:   Do you feel faint:  no   Does it feel like the surroundings (bed, room) are moving: no   Unsteady/off balance: YES  Have you passed out or fallen: no     Intensity: moderate    Progression of Symptoms:  Worsening, intermittent and same    Accompanying Signs & Symptoms:  Heart palpitations: YES- ongoing problem following cardiology. Flutters when laying on left side at bed time   Nausea, vomiting: no   Weakness in arms or legs: no   Fatigue: YES  Vision or speech changes: YES- Pt has a brain aneurysm.   Ringing in ears (Tinnitus): YES- ringing and muffled feeling, mild ear pain   Hearing Loss: YES- plugged.     History:   Head trauma/concussion hx: no   Previous similar symptoms: YES  Recent bleeding history: no     Precipitating factors:   Worse with activity or head movement: YES, looking up and down   Any new medications (BP?): no   Alcohol/drug abuse/withdrawal: no     Alleviating factors:   Does staying in a fixed position give relief:  YES    Therapies Tried and outcome: Allergy pill     Patient with symptoms dizziness vertigo for past 1 week. Several \"episodes\" of dizziness with head movement. Quickly abating in less than 2 minutes or so for each episode. No headaches. Has had some allergy symptoms including feeling of some ear fullness bilaterally. Right is > left. No ear pain. No sinus pressure.     Patient has history of vertebral artery dissection with stroke, no residual affects. Also had incidental finding of aneurysm a couple years ago that is followed by neurosurgery. She has plans for coiling this summer. Upcoming visit with neurosurgery in a few weeks is planned. Discussed notifying them of these symptoms as well. She does note however that these symptoms are not similar to those she felt during stroke, artery dissection.      Reviewed " "and updated as needed this visit by provider:  Tobacco  Allergies  Meds  Problems  Med Hx  Surg Hx  Fam Hx         Review of Systems   Constitutional, HEENT, cardiovascular, pulmonary, GI, , musculoskeletal, neuro, skin, endocrine and psych systems are negative, except as otherwise noted per HPI.      Objective   /86   Pulse 88   Temp 98.5  F (36.9  C) (Tympanic)   Ht 1.651 m (5' 5\")   Wt 104.8 kg (231 lb)   SpO2 99%   Breastfeeding No   BMI 38.44 kg/m   Body mass index is 38.44 kg/m .  Physical Exam   GENERAL: healthy, alert, well nourished, well hydrated, no distress  HENT: ear canals- normal; TMs- bilateral clear fluid effusion, left TM bulging. Nose- normal; Mouth- no ulcers, no lesions  NECK: no tenderness, no adenopathy, no asymmetry, no masses, no stiffness; thyroid- normal to palpation  RESP: lungs clear to auscultation - no rales, no rhonchi, no wheezes  CV: regular rates and rhythm, normal S1 S2, no S3 or S4 and no murmur, no click or rub -  MS: extremities- no gross deformities noted, no edema  NEURO: strength and tone- normal, sensory exam- grossly normal, mentation- intact, speech- normal, reflexes- symmetric  PSYCH: Alert and oriented times 3; speech- coherent , normal rate and volume; able to articulate logical thoughts, able to abstract reason, no tangential thoughts, no hallucinations or delusions, affect- normal          Assessment & Plan   Sayda was seen today for dizziness.    Diagnoses and all orders for this visit:    Dizziness  Likely related to below given exam findings and symptoms correlate.     OME (otitis media with effusion), bilateral  Flonase and continue allergy medications. If not effective in clearing please let us know and please update neurosurgery/neurology with symptoms in case they would want repeat imaging.     Benign essential hypertension  -     metoprolol succinate ER (TOPROL-XL) 50 MG 24 hr tablet; Take 1 tablet (50 mg) by mouth daily           BMI: " "  Estimated body mass index is 38.77 kg/m  as calculated from the following:    Height as of 1/14/20: 1.651 m (5' 5\").    Weight as of 1/14/20: 105.7 kg (233 lb).           See Patient Instructions    No follow-ups on file.            CAROLINA Salgado     [address] [address]  [email or web address]   Office: [phone]           "

## 2020-04-20 ENCOUNTER — TELEPHONE (OUTPATIENT)
Dept: OPHTHALMOLOGY | Facility: CLINIC | Age: 35
End: 2020-04-20

## 2020-04-20 NOTE — TELEPHONE ENCOUNTER
Left message for patient.  Upcoming appointment with Dr. Alvarenga has been canceled due to concerns of COVID-19 and the safety of our patients.  Left main appointment line phone number in order to reschedule for AT LEAST THREE MONTHS OUT.  Tray message also sent     Freya Cowan on 4/20/2020 at 10:24 AM

## 2020-06-25 ENCOUNTER — OFFICE VISIT (OUTPATIENT)
Dept: FAMILY MEDICINE | Facility: CLINIC | Age: 35
End: 2020-06-25
Payer: COMMERCIAL

## 2020-06-25 VITALS
HEIGHT: 65 IN | SYSTOLIC BLOOD PRESSURE: 126 MMHG | BODY MASS INDEX: 39.49 KG/M2 | DIASTOLIC BLOOD PRESSURE: 74 MMHG | OXYGEN SATURATION: 100 % | WEIGHT: 237 LBS | HEART RATE: 89 BPM | TEMPERATURE: 97.1 F

## 2020-06-25 DIAGNOSIS — R39.9 UTI SYMPTOMS: ICD-10-CM

## 2020-06-25 DIAGNOSIS — Z12.4 SCREENING FOR MALIGNANT NEOPLASM OF CERVIX: ICD-10-CM

## 2020-06-25 DIAGNOSIS — R30.0 DYSURIA: Primary | ICD-10-CM

## 2020-06-25 LAB
ALBUMIN UR-MCNC: NEGATIVE MG/DL
APPEARANCE UR: CLEAR
BACTERIA #/AREA URNS HPF: ABNORMAL /HPF
BILIRUB UR QL STRIP: NEGATIVE
COLOR UR AUTO: YELLOW
GLUCOSE UR STRIP-MCNC: NEGATIVE MG/DL
HGB UR QL STRIP: NEGATIVE
KETONES UR STRIP-MCNC: NEGATIVE MG/DL
LEUKOCYTE ESTERASE UR QL STRIP: NEGATIVE
NITRATE UR QL: NEGATIVE
PH UR STRIP: 6.5 PH (ref 5–7)
RBC #/AREA URNS AUTO: ABNORMAL /HPF
SOURCE: ABNORMAL
SP GR UR STRIP: 1.02 (ref 1–1.03)
SPECIMEN SOURCE: NORMAL
URNS CMNT MICRO: ABNORMAL
UROBILINOGEN UR STRIP-ACNC: 0.2 EU/DL (ref 0.2–1)
WBC #/AREA URNS AUTO: ABNORMAL /HPF
WET PREP SPEC: NORMAL

## 2020-06-25 PROCEDURE — 87086 URINE CULTURE/COLONY COUNT: CPT | Performed by: FAMILY MEDICINE

## 2020-06-25 PROCEDURE — 87210 SMEAR WET MOUNT SALINE/INK: CPT | Performed by: FAMILY MEDICINE

## 2020-06-25 PROCEDURE — 99214 OFFICE O/P EST MOD 30 MIN: CPT | Performed by: FAMILY MEDICINE

## 2020-06-25 PROCEDURE — G0145 SCR C/V CYTO,THINLAYER,RESCR: HCPCS | Performed by: FAMILY MEDICINE

## 2020-06-25 PROCEDURE — 87624 HPV HI-RISK TYP POOLED RSLT: CPT | Performed by: FAMILY MEDICINE

## 2020-06-25 PROCEDURE — 81001 URINALYSIS AUTO W/SCOPE: CPT | Performed by: FAMILY MEDICINE

## 2020-06-25 ASSESSMENT — MIFFLIN-ST. JEOR: SCORE: 1770.9

## 2020-06-25 NOTE — PROGRESS NOTES
SUBJECTIVE:                                                    Sayda Valles is a 34 year old female who presents to clinic today for the following health issues:    URINARY TRACT SYMPTOMS:       Duration: about 1-1.5 weeks     Description  dysuria, frequency and bloating     Intensity:  mild    Accompanying signs and symptoms:  Fever/chills: no   Flank pain no   Nausea and vomiting: YES- Nausea   Vaginal symptoms: odor- scant discharge - yellowish.   Abdominal/Pelvic Pain: no     History  History of frequent UTI's: no   History of kidney stones: no   Sexually Active: no   Possibility of pregnancy: No    Precipitating or alleviating factors: None    Therapies tried and outcome: none   Outcome: none    Patient Active Problem List   Diagnosis     Allergic rhinitis     Varicose Veins of Legs- painful with standing     Abnormal Mammogram- 2/2006 - prob benign      Heart palpitations - since 2010 intermittent. Reports multiple work-ups with unclear cause. Followed by cardiology.     CARDIOVASCULAR SCREENING; LDL GOAL LESS THAN 160     Anxiety     Migraine with aura and without status migrainosus, not intractable - since 2003; stable.     Mild persistent asthma without complication     Bilateral ankle pain     Family history of breast cancer - paternal grandma, paternal aunt and paternal great-grandmother. Dad completed genetic screening which was neg.      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)     Borderline Aortic root enlargement (H)       Current Outpatient Medications   Medication Sig Dispense Refill     acetaminophen (TYLENOL) 500 MG tablet Take 1,000 mg by mouth 2  "times daily as needed        albuterol (PROAIR HFA/PROVENTIL HFA/VENTOLIN HFA) 108 (90 BASE) MCG/ACT Inhaler Inhale 1-2 puffs into the lungs every 4 hours as needed for shortness of breath / dyspnea or wheezing 1 Inhaler 1     aspirin (ASA) 81 MG EC tablet Take 1 tablet (81 mg) by mouth daily       Camphor-Menthol 0.2-3.5 % LIQD Externally apply 1 spray topically       cyclobenzaprine (FLEXERIL) 10 MG tablet Take 1 tablet (10 mg) by mouth 2 times daily as needed for muscle spasms 10 tablet 0     hydrOXYzine (ATARAX) 25 MG tablet Take 1 tablet (25 mg) by mouth nightly as needed for anxiety or other (insomnia) 30 tablet 1     loratadine (CLARITIN) 10 MG tablet Take 10 mg by mouth daily       metoprolol succinate ER (TOPROL-XL) 50 MG 24 hr tablet Take 1 tablet (50 mg) by mouth daily 90 tablet 3     Multiple Vitamins-Minerals (MULTIVITAMIN GUMMIES WOMENS) CHEW Smarty Pants Women's Complete       VITAMIN D PO Take 2,000 Int'l Units by mouth            Allergies   Allergen Reactions     Zithromax [Azithromycin Dihydrate] GI Disturbance          Problem list and histories reviewed & adjusted, as indicated.  Additional history: as documented    Reviewed and updated as needed this visit by clinical staff  Tobacco  Allergies  Meds  Med Hx  Surg Hx  Fam Hx  Soc Hx      Reviewed and updated as needed this visit by Provider         ROS:   ROS: 12 point ROS neg other than the symptoms noted above    OBJECTIVE:                                                    /74 (BP Location: Left arm, Cuff Size: Adult Large)   Pulse 89   Temp 97.1  F (36.2  C) (Oral)   Ht 1.651 m (5' 5\")   Wt 107.5 kg (237 lb)   SpO2 100%   BMI 39.44 kg/m    Body mass index is 39.44 kg/m .   GENERAL: healthy, alert, well nourished, well hydrated, no distress  HENT: ear canals- normal; TMs- normal; Nose- normal; Mouth- no ulcers, no lesions  NECK: no tenderness, no adenopathy, no asymmetry, no masses, no stiffness; thyroid- normal to " palpation  RESP: lungs clear to auscultation - no rales, no rhonchi, no wheezes  CV: regular rates and rhythm, normal S1 S2, no S3 or S4 and no murmur, no click or rub -  ABDOMEN: soft, no tenderness, no  hepatosplenomegaly, no masses, normal bowel sounds  MS: extremities- no gross deformities noted, no edema  BACK: no CVA tenderness, no paralumbar tenderness  Vagina and vulva are normal;  no discharge is noted.  Cervix normal without lesions. Uterus anteverted and mobile, normal in size and shape without tenderness.  Adnexa normal in size without masses or tenderness. Pap Smear - is due and ordered today.      Diagnostic test results:  Diagnostic Test Results:  Labs reviewed in Epic  Results for orders placed or performed in visit on 06/25/20   UA with Microscopic     Status: Abnormal   Result Value Ref Range    Color Urine Yellow     Appearance Urine Clear     Glucose Urine Negative NEG^Negative mg/dL    Bilirubin Urine Negative NEG^Negative    Ketones Urine Negative NEG^Negative mg/dL    Specific Gravity Urine 1.025 1.003 - 1.035    pH Urine 6.5 5.0 - 7.0 pH    Protein Albumin Urine Negative NEG^Negative mg/dL    Urobilinogen Urine 0.2 0.2 - 1.0 EU/dL    Nitrite Urine Negative NEG^Negative    Blood Urine Negative NEG^Negative    Leukocyte Esterase Urine Negative NEG^Negative    Source Midstream Urine     WBC Urine 0 - 5 OTO5^0 - 5 /HPF    RBC Urine O - 2 OTO2^O - 2 /HPF    Bacteria Urine Few (A) NEG^Negative /HPF    Comment Urine UNSPUN MICRO QNS    Pap imaged thin layer screen with HPV - recommended age 30 - 65 years (select HPV order below)     Status: None   Result Value Ref Range    PAP NIL     Copath Report         Patient Name: ROHINI CRUZ  MR#: [id number]  Specimen #: [id number]  Collected: 6/25/2020  Received: 6/29/2020  Reported: 7/1/2020 08:42  Ordering Phy(s): TYREL LAUREN    For improved result formatting, select 'View Enhanced Report Format' under   Linked Documents  section.    SPECIMEN/STAIN PROCESS:  Pap imaged thin layer prep screening (Surepath, FocalPoint with guided   screening)       Pap-Cyto x 1, HPV ordered x 1    SOURCE: Cervical, endocervical  ----------------------------------------------------------------   Pap imaged thin layer prep screening (Surepath, FocalPoint with guided   screening)  SPECIMEN ADEQUACY:  Satisfactory for evaluation.  -Transformation zone component absent.    CYTOLOGIC INTERPRETATION:    Negative for intraepithelial lesion or malignancy    Electronically signed out by:  GRANT Norton (ASCP)    CLINICAL HISTORY:  LMP: 06/08/2020  A previous normal pap  Date of Last Pap: 03/11/2016,    Papanicolaou Test Limitations:  Cervical cytology is a scre ening test with   limited sensitivity; regular  screening is critical for cancer prevention; Pap tests are primarily   effective for the diagnosis/prevention of  squamous cell carcinoma, not adenocarcinomas or other cancers.    COLLECTION SITE:  Client:  [facility]  Location: [facility] (R)    The technical component of this testing was completed at the [facility], with the professional component performed   at the [facility], [address] [place] [phone] ([phone])       HPV High Risk Types DNA Cervical     Status: None   Result Value Ref Range    HPV Source SurePath     HPV 16 DNA Negative NEG^Negative    HPV 18 DNA Negative NEG^Negative    Other HR HPV Negative NEG^Negative    Final Diagnosis This patient's sample is negative for HPV DNA.     Specimen Description Cervical Cells    Urine Culture Aerobic Bacterial     Status: None    Specimen: Midstream Urine   Result Value Ref Range    Specimen Description Midstream Urine     Culture Micro       50,000 to 100,000 colonies/mL  mixed urogenital kim  Susceptibility testing not routinely done     Wet prep     Status:  None    Specimen: Vagina   Result Value Ref Range    Specimen Description Vagina     Wet Prep No clue cells seen     Wet Prep No Trichomonas seen     Wet Prep No yeast seen         ASSESSMENT/PLAN:                                                        ICD-10-CM    1. Dysuria  R30.0 UA with Microscopic     Urine Culture Aerobic Bacterial     Wet prep   2. UTI symptoms  R39.9    3. Screening for malignant neoplasm of cervix  Z12.4 Pap imaged thin layer screen with HPV - recommended age 30 - 65 years (select HPV order below)     HPV High Risk Types DNA Cervical       Please, call or return to clinic or go to the ER immediately if signs or symptoms worsen or fail to improve as anticipated. Await UC .     See Patient Instructions         Kristyn Cross MD    [facility]

## 2020-06-26 LAB
BACTERIA SPEC CULT: NORMAL
SPECIMEN SOURCE: NORMAL

## 2020-06-26 ASSESSMENT — ASTHMA QUESTIONNAIRES: ACT_TOTALSCORE: 22

## 2020-07-01 LAB
COPATH REPORT: NORMAL
PAP: NORMAL

## 2020-07-02 LAB
FINAL DIAGNOSIS: NORMAL
HPV HR 12 DNA CVX QL NAA+PROBE: NEGATIVE
HPV16 DNA SPEC QL NAA+PROBE: NEGATIVE
HPV18 DNA SPEC QL NAA+PROBE: NEGATIVE
SPECIMEN DESCRIPTION: NORMAL
SPECIMEN SOURCE CVX/VAG CYTO: NORMAL

## 2020-07-18 ENCOUNTER — NURSE TRIAGE (OUTPATIENT)
Dept: NURSING | Facility: CLINIC | Age: 35
End: 2020-07-18

## 2020-09-07 ENCOUNTER — TELEPHONE (OUTPATIENT)
Dept: FAMILY MEDICINE | Facility: CLINIC | Age: 35
End: 2020-09-07

## 2020-09-07 ENCOUNTER — NURSE TRIAGE (OUTPATIENT)
Dept: NURSING | Facility: CLINIC | Age: 35
End: 2020-09-07

## 2020-09-07 DIAGNOSIS — I10 BENIGN ESSENTIAL HYPERTENSION: ICD-10-CM

## 2020-09-07 RX ORDER — METOPROLOL SUCCINATE 50 MG/1
50 TABLET, EXTENDED RELEASE ORAL DAILY
Qty: 90 TABLET | Refills: 0 | Status: SHIPPED | OUTPATIENT
Start: 2020-09-07 | End: 2020-09-22

## 2020-09-07 NOTE — TELEPHONE ENCOUNTER
Clinic Action Needed:  None/FYI  FNA Triage Call  Presenting Problem:    Sayda is out of blood pressure medication and local pharmacy is closed.  Medication is metoprolol succinate ER Toporol-AL Take 1 tablet 50mg per day.  [facility] on [facility] in [facility].  FNA paged on call Esdras August to phone FNA back at 11:27 am.  MD gave verbal for 90 tablets with no refills and states that Sayda is needing to schedule a physical.  FNA relayed message to Sayda.      Routed to:  RN Pool    Please be sure to close this encounter once this patient's issue/question has been addressed.    Kathleen Christianson RN/[facility]

## 2020-09-07 NOTE — TELEPHONE ENCOUNTER
Sayda is out of blood pressure medication and local pharmacy is closed.  Medication is metoprolol succinate ER Toporol-AL Take 1 tablet 50mg per day.  [facility]rie on [facility] in [facility].  DICK paged on call Esdras Koch to phone DICK back at 11:27 am.  MD gave verbal for 90 tablets with no refills and states that Sayda is needing to schedule a physical.  DICK relayed message to Sayda.      COVID 19 Nurse Triage Plan/Patient Instructions    Please be aware that novel coronavirus (COVID-19) may be circulating in the community. If you develop symptoms such as fever, cough, or SOB or if you have concerns about the presence of another infection including coronavirus (COVID-19), please contact your health care provider or visit [email or web address].     Disposition/Instructions    Home care recommended. Follow home care protocol based instructions.    Thank you for taking steps to prevent the spread of this virus.  o Limit your contact with others.  o Wear a simple mask to cover your cough.  o Wash your hands well and often.    Resources    [facility]: About COVID-19: [email or web address]    CDC: What to Do If You're Sick: www.cdc.gov/coronavirus/2019-ncov/about/steps-when-sick.html    CDC: Ending Home Isolation: www.cdc.gov/coronavirus/2019-ncov/hcp/disposition-in-home-patients.html     CDC: Caring for Someone: www.cdc.gov/coronavirus/2019-ncov/if-you-are-sick/care-for-someone.html     [facility]: Interim Guidance for Hospital Discharge to Home: www.health.[facility].[place]/diseases/coronavirus/hcp/hospdischarge.pdf    [facility] clinical trials (COVID-19 research studies): clinicalaffairs.[facility]/umn-clinical-trials     Below are the COVID-19 hotlines at the [place] Department of [facility] ([facility]). Interpreters are available.   o For health questions: Call 017-067-8907 or 1-744.841.1870 (7 a.m. to 7 p.m.)  o For questions about schools and childcare: Call 409-569-1453 or 1-740.745.7645 (7 a.m. to 7 p.m.)                        Additional Information    Caller is angry or rude (e.g., hangs up, verbally abusive, yelling)    Negative: Nursing judgment, per information in Reference    Negative: Information only call about a Well Adult (no illness or injury)    Negative: Nursing judgment or information in reference    Negative: Nursing judgment or information in reference    Negative: Nursing judgment or information in reference    Negative: Nursing judgment or information in reference    Negative: Nursing judgment or information in reference    Negative: Nursing judgment or information in reference    Negative: Nursing judgment or information in reference    Negative: Nursing judgment or information in reference    Negative: Nursing judgment or information in reference    Negative: Nursing judgment or information in reference    Negative: Nursing judgment or information in reference    Negative: Nursing judgment or information in reference    Negative: Nursing judgment or information in reference    Nursing judgment or information in reference    Protocols used: NO CONTACT OR DUPLICATE CONTACT CALL-A-, NO GUIDELINE [id number]    BP Readings from Last 3 Encounters:   06/25/20 126/74   03/05/20 134/86   01/14/20 122/88

## 2020-09-15 ENCOUNTER — OFFICE VISIT (OUTPATIENT)
Dept: FAMILY MEDICINE | Facility: CLINIC | Age: 35
End: 2020-09-15
Payer: COMMERCIAL

## 2020-09-15 VITALS
OXYGEN SATURATION: 100 % | BODY MASS INDEX: 39.32 KG/M2 | HEART RATE: 90 BPM | TEMPERATURE: 96.9 F | HEIGHT: 65 IN | WEIGHT: 236 LBS | DIASTOLIC BLOOD PRESSURE: 82 MMHG | SYSTOLIC BLOOD PRESSURE: 124 MMHG

## 2020-09-15 DIAGNOSIS — Z51.81 MEDICATION MONITORING ENCOUNTER: ICD-10-CM

## 2020-09-15 DIAGNOSIS — I67.1 BRAIN ANEURYSM: ICD-10-CM

## 2020-09-15 DIAGNOSIS — I10 HYPERTENSION GOAL BP (BLOOD PRESSURE) < 140/90: ICD-10-CM

## 2020-09-15 DIAGNOSIS — M54.6 ACUTE MIDLINE THORACIC BACK PAIN: Primary | ICD-10-CM

## 2020-09-15 DIAGNOSIS — E66.01 MORBID OBESITY (H): ICD-10-CM

## 2020-09-15 DIAGNOSIS — Z86.73 HISTORY OF STROKE: ICD-10-CM

## 2020-09-15 PROCEDURE — 99214 OFFICE O/P EST MOD 30 MIN: CPT | Performed by: FAMILY MEDICINE

## 2020-09-15 ASSESSMENT — MIFFLIN-ST. JEOR: SCORE: 1766.37

## 2020-09-15 NOTE — PROGRESS NOTES
[facility]  [place]    SUBJECTIVE    Sayda Valles is a 34 year old female who presents to clinic today for the following health issues:    [facility] urgent care follow up 9/11, pain started 9/4 - right mid back pain, no known injuries or trauma, walks dogs routinely.  Chiropractor not helping - actually making it worse - may have started this 2 weeks ago with chiropractic adjustment. Was getting better, adjustment yesterday made it worse.  Radiology report pending.    Mid right back area hurts with most movements, localized.  Pain scale 7/10 today.  Last few days has been 9/10 with most movements. No radiation. No numbness, no tingling. No incontinence    Laying and sitting makes it better, as well as walking hunched over.  Taking muscle relaxer very rarely. Worse with reaching motions, walking upstairs.    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    Reviewed and updated as needed this visit by Provider    BP Readings from Last 3 Encounters:   09/15/20 124/82   06/25/20 126/74   03/05/20 134/86       body mass index is 39.27 kg/m .    Wt Readings from Last 4 Encounters:   09/15/20 107 kg (236 lb)   06/25/20 107.5 kg (237 lb)   03/05/20 104.8 kg (231 lb)   01/14/20 105.7 kg (233 lb)       Health Maintenance    Health Maintenance Due   Topic Date Due     ASTHMA ACTION PLAN  06/15/2019     PREVENTIVE CARE VISIT  09/19/2019     INFLUENZA VACCINE (1) 09/01/2020       Current Problem List    Patient Active Problem List   Diagnosis     Allergic rhinitis     Varicose Veins of Legs- painful with standing     Abnormal Mammogram- 2/2006 - prob benign      Heart palpitations - since 2010 intermittent. Reports multiple work-ups with unclear cause. Followed  by cardiology.     CARDIOVASCULAR SCREENING; LDL GOAL LESS THAN 160     Anxiety     Migraine with aura and without status migrainosus, not intractable - since 2003; stable.     Mild persistent asthma without complication     Bilateral ankle pain     Family history of breast cancer - paternal grandma, paternal aunt and paternal great-grandmother. Dad completed genetic screening which was neg.      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)     Borderline Aortic root enlargement (H)     History of stroke     Brain aneurysm     Hypertension goal BP (blood pressure) < 140/90     Morbid obesity (H)       Past Medical History    Past Medical History:   Diagnosis Date     Allergic rhinitis, cause unspecified     Allergic rhinitis     Aneurysm (H)     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 Provider to review and confirm.     Family history of malignant neoplasm of breast     4 generations on her father's side     FAMILY HX BREAST MALIG      Heart palpitations 2/10    PVC's - dr sandhu     Hypertension goal BP (blood pressure) < 140/90 9/15/2020     Migraine without aura, with intractable migraine, so stated, without mention of status migrainosus     sees neurologist     Mild persistent asthma      Non morbid obesity due to excess calories 5/9/2016     Simple goiter 11/25/2008    pt has no enlargement and had normal blood work-up at that time     Stroke (H) 06/15/2019    due to L vert artery dissection after chiropractor  manipulation       Past Surgical History    Past Surgical History:   Procedure Laterality Date     NO HISTORY OF SURGERY         Current Medications    Current Outpatient Medications   Medication Sig Dispense Refill     acetaminophen (TYLENOL) 500 MG tablet Take 1,000 mg by mouth 2 times daily as needed        albuterol (PROAIR HFA/PROVENTIL HFA/VENTOLIN HFA) 108 (90 BASE) MCG/ACT Inhaler Inhale 1-2 puffs into the lungs every 4 hours as needed for shortness of breath / dyspnea or wheezing 1 Inhaler 1     aspirin (ASA) 81 MG EC tablet Take 1 tablet (81 mg) by mouth daily       Camphor-Menthol 0.2-3.5 % LIQD Externally apply 1 spray topically       cyclobenzaprine (FLEXERIL) 10 MG tablet Take 1 tablet (10 mg) by mouth 2 times daily as needed for muscle spasms 10 tablet 0     loratadine (CLARITIN) 10 MG tablet Take 10 mg by mouth daily       metoprolol succinate ER (TOPROL-XL) 50 MG 24 hr tablet Take 1 tablet (50 mg) by mouth daily 90 tablet 0     Multiple Vitamins-Minerals (MULTIVITAMIN GUMMIES WOMENS) CHEW Smarty Pants Women's Complete       VITAMIN D PO Take 2,000 Int'l Units by mouth         Allergies    Allergies   Allergen Reactions     Zithromax [Azithromycin Dihydrate] GI Disturbance       Immunizations    Immunization History   Administered Date(s) Administered     HPV 09/12/2007, 12/11/2007, 05/29/2008     Influenza (H1N1) 12/03/2009     Influenza (IIV3) PF 11/02/2006, 10/25/2007, 11/25/2008, 11/20/2009, 11/16/2010, 11/29/2011     TD (ADULT, 7+) 08/28/2018     TDAP Vaccine (Adacel) 09/12/2007       Family History    Family History   Problem Relation Age of Onset     Lipids Father      Neurologic Disorder Father         epilepsy     Heart Disease Father 47        MI     Hypertension Father      Hyperlipidemia Father      Lipids Sister      Hyperlipidemia Sister      Breast Cancer Paternal Grandmother         3rd generation      Breast Cancer Paternal Aunt      Diabetes Mother         gestational      Asthma  Mother      Diabetes Paternal Grandfather      Other Cancer Maternal Grandfather         Skin     Hyperlipidemia Paternal Half-Sister      Breast Cancer Other      Other Cancer Maternal Grandmother         Lung     Ovarian Cancer Maternal Grandmother      Lung Cancer Maternal Grandmother      Diabetes Maternal Uncle      Diabetes Maternal Uncle      Colon Cancer No family hx of        Social History    Social History     Socioeconomic History     Marital status: Single     Spouse name: none     Number of children: Not on file     Years of education: Not on file     Highest education level: Not on file   Occupational History     Occupation:  at [employer]      Employer: OTHER   Social Needs     Financial resource strain: Not on file     Food insecurity     Worry: Not on file     Inability: Not on file     Transportation needs     Medical: Not on file     Non-medical: Not on file   Tobacco Use     Smoking status: Former Smoker     Packs/day: 0.50     Years: 2.00     Pack years: 1.00     Types: Cigarettes     Start date:      Last attempt to quit: 2001     Years since quittin.6     Smokeless tobacco: Never Used   Substance and Sexual Activity     Alcohol use: Yes     Comment: 0-0.5 glasses of wine per month     Drug use: No     Sexual activity: Not Currently     Partners: Male     Birth control/protection: Condom   Lifestyle     Physical activity     Days per week: Not on file     Minutes per session: Not on file     Stress: Not on file   Relationships     Social connections     Talks on phone: Not on file     Gets together: Not on file     Attends Mu-ism service: Not on file     Active member of club or organization: Not on file     Attends meetings of clubs or organizations: Not on file     Relationship status: Not on file     Intimate partner violence     Fear of current or ex partner: Not on file     Emotionally abused: Not on file     Physically abused: Not on file     Forced  "sexual activity: Not on file   Other Topics Concern      Service No     Blood Transfusions No     Caffeine Concern No     Occupational Exposure No     Hobby Hazards No     Sleep Concern No     Stress Concern No     Weight Concern Yes     Comment: Would like to lose more weight     Special Diet No     Back Care No     Exercise Yes     Comment: Moderate     Bike Helmet No     Seat Belt Yes     Comment: always      Self-Exams Yes     Comment: SBE encouraged monthly      Parent/sibling w/ CABG, MI or angioplasty before 65F 55M? Yes   Social History Narrative    Calcium - 2-3 dairy servings/ day     Menarche: at age 12    Menses: regular        All above reviewed and updated, all stable unless otherwise noted    Recent labs reviewed    ROS    CONSTITUTIONAL: NEGATIVE for fever, chills, change in weight  INTEGUMENTARY/SKIN: NEGATIVE for worrisome rashes, moles or lesions  EYES: NEGATIVE for vision changes or irritation  ENT/MOUTH: NEGATIVE for ear, mouth and throat problems  RESP: NEGATIVE for significant cough or SOB  CV: NEGATIVE for chest pain, palpitations or peripheral edema  GI: NEGATIVE for nausea, abdominal pain, heartburn, or change in bowel habits  : NEGATIVE for frequency, dysuria, or hematuria  MUSCULOSKELETAL: NEGATIVE for significant arthralgias or myalgia  NEURO: NEGATIVE for weakness, dizziness or paresthesias  ENDOCRINE: NEGATIVE for temperature intolerance, skin/hair changes  HEME: NEGATIVE for bleeding problems  PSYCHIATRIC: NEGATIVE for changes in mood or affect    OBJECTIVE    /82   Pulse 90   Temp 96.9  F (36.1  C) (Tympanic)   Ht 1.651 m (5' 5\")   Wt 107 kg (236 lb)   SpO2 100%   BMI 39.27 kg/m    Body mass index is 39.27 kg/m .  GENERAL: healthy, alert and no distress  EYES: Eyes grossly normal to inspection, extraocular movements - intact, and PERRL  HENT: ear canals- normal; TMs- normal; Nose- normal; Mouth- no ulcers, no lesions  NECK: no tenderness, no adenopathy, no " asymmetry, no masses, no stiffness; thyroid- normal to palpation  RESP: lungs clear to auscultation - no rales, no rhonchi, no wheezes  CV: regular rates and rhythm, normal S1 S2, no S3 or S4 and no murmur, no click or rub -  ABDOMEN: soft, no tenderness, no  hepatosplenomegaly, no masses, normal bowel sounds  MS: extremities- no gross deformities noted, no edema  SKIN: no suspicious lesions, no rashes  NEURO: strength and tone- normal, sensory exam- grossly normal, mentation- intact, speech- normal, reflexes- symmetric  BACK: no CVA tenderness, no paralumbar tenderness  PSYCH: Alert and oriented times 3; speech- coherent , normal rate and volume; able to articulate logical thoughts, able to abstract reason, no tangential thoughts, no hallucinations or delusions, affect- normal  LYMPHATICS: no cervical adenopathy    DIAGNOSTICS/PROCEDURE    Pending     ASSESSMENT      ICD-10-CM    1. Acute midline thoracic back pain  M54.6    2. History of stroke  Z86.73    3. Brain aneurysm  I67.1    4. Hypertension goal BP (blood pressure) < 140/90  I10    5. Morbid obesity (H)  E66.01    6. Medication monitoring encounter  Z51.81        PLAN    Discussed treatment/modality options, including risk and benefits she desires:    1) heat/ice, gentle stretching    2) ibuprofen 800 mg every 8 hrs with food alternating with tylenol 1000 every 8 hrs    3) consider PT    4) Get [facility] notes    5) f/u with Brittany Miles next week    All diagnosis above reviewed and noted above, otherwise stable.  See [facility] orders for further details.     Return in about 1 week (around 9/22/2020), or if symptoms worsen or fail to improve, for Complete Physical, Follow Up Acute, Follow Up Chronic, Medication Recheck Visit.    Health Maintenance Due   Topic Date Due     ASTHMA ACTION PLAN  06/15/2019     PREVENTIVE CARE VISIT  09/19/2019     INFLUENZA VACCINE (1) 09/01/2020       See Patient Instructions             Esdras Koch MD, [facility]   [facility]  [address] [place] [id number]  [email or web address]  [email or web address]   Office: [phone]  Fax: [phone]  Pager: [phone]

## 2020-09-18 NOTE — PROGRESS NOTES
Pre-Visit Planning     Future Appointments   Date Time Provider Department Center   9/22/2020 10:40 AM Brittany Miles APRN CNP [facility]     Arrival Time for this Appointment: 10:15 AM   Appointment Notes for this encounter:   Phsical--has been more than 1 year    Questionnaires Reviewed/Assigned  No additional questionnaires are needed    LOV 6/25/20 Dysurina, 3/5/20 Dizziness, 1/14/20 Thyroid nodule  1/8/20 Neuro Surgery Consult--2nd option stroke      Patient preferred phone number: [phone]    [employer] message sent to Sayda to obtain additional info to complete PVP.   Rama Heaton R.N.

## 2020-09-22 ENCOUNTER — OFFICE VISIT (OUTPATIENT)
Dept: FAMILY MEDICINE | Facility: CLINIC | Age: 35
End: 2020-09-22
Payer: COMMERCIAL

## 2020-09-22 VITALS
HEIGHT: 65 IN | SYSTOLIC BLOOD PRESSURE: 120 MMHG | TEMPERATURE: 96.9 F | BODY MASS INDEX: 39.37 KG/M2 | HEART RATE: 87 BPM | DIASTOLIC BLOOD PRESSURE: 90 MMHG | WEIGHT: 236.3 LBS | OXYGEN SATURATION: 97 %

## 2020-09-22 DIAGNOSIS — J45.30 MILD PERSISTENT ASTHMA WITHOUT COMPLICATION: ICD-10-CM

## 2020-09-22 DIAGNOSIS — Z13.1 SCREENING FOR DIABETES MELLITUS: ICD-10-CM

## 2020-09-22 DIAGNOSIS — I10 BENIGN ESSENTIAL HYPERTENSION: ICD-10-CM

## 2020-09-22 DIAGNOSIS — E04.1 THYROID NODULE: ICD-10-CM

## 2020-09-22 DIAGNOSIS — Z00.00 ROUTINE HISTORY AND PHYSICAL EXAMINATION OF ADULT: Primary | ICD-10-CM

## 2020-09-22 DIAGNOSIS — Z13.220 SCREENING FOR LIPID DISORDERS: ICD-10-CM

## 2020-09-22 PROCEDURE — 36415 COLL VENOUS BLD VENIPUNCTURE: CPT | Performed by: NURSE PRACTITIONER

## 2020-09-22 PROCEDURE — 80061 LIPID PANEL: CPT | Performed by: NURSE PRACTITIONER

## 2020-09-22 PROCEDURE — 80053 COMPREHEN METABOLIC PANEL: CPT | Performed by: NURSE PRACTITIONER

## 2020-09-22 PROCEDURE — 84443 ASSAY THYROID STIM HORMONE: CPT | Performed by: NURSE PRACTITIONER

## 2020-09-22 PROCEDURE — 99214 OFFICE O/P EST MOD 30 MIN: CPT | Mod: 25 | Performed by: NURSE PRACTITIONER

## 2020-09-22 PROCEDURE — 99395 PREV VISIT EST AGE 18-39: CPT | Performed by: NURSE PRACTITIONER

## 2020-09-22 RX ORDER — METOPROLOL SUCCINATE 50 MG/1
50 TABLET, EXTENDED RELEASE ORAL DAILY
Qty: 90 TABLET | Refills: 3 | Status: SHIPPED | OUTPATIENT
Start: 2020-09-22 | End: 2021-09-29

## 2020-09-22 RX ORDER — ALBUTEROL SULFATE 90 UG/1
1-2 AEROSOL, METERED RESPIRATORY (INHALATION) EVERY 4 HOURS PRN
Qty: 1 INHALER | Refills: 1 | Status: SHIPPED | OUTPATIENT
Start: 2020-09-22 | End: 2023-11-13

## 2020-09-22 ASSESSMENT — MIFFLIN-ST. JEOR: SCORE: 1767.73

## 2020-09-23 LAB
ALBUMIN SERPL-MCNC: 3.7 G/DL (ref 3.4–5)
ALP SERPL-CCNC: 54 U/L (ref 40–150)
ALT SERPL W P-5'-P-CCNC: 23 U/L (ref 0–50)
ANION GAP SERPL CALCULATED.3IONS-SCNC: 6 MMOL/L (ref 3–14)
AST SERPL W P-5'-P-CCNC: 13 U/L (ref 0–45)
BILIRUB SERPL-MCNC: 0.5 MG/DL (ref 0.2–1.3)
BUN SERPL-MCNC: 9 MG/DL (ref 7–30)
CALCIUM SERPL-MCNC: 8.3 MG/DL (ref 8.5–10.1)
CHLORIDE SERPL-SCNC: 109 MMOL/L (ref 94–109)
CHOLEST SERPL-MCNC: 167 MG/DL
CO2 SERPL-SCNC: 21 MMOL/L (ref 20–32)
CREAT SERPL-MCNC: 0.7 MG/DL (ref 0.52–1.04)
GFR SERPL CREATININE-BSD FRML MDRD: >90 ML/MIN/{1.73_M2}
GLUCOSE SERPL-MCNC: 87 MG/DL (ref 70–99)
HDLC SERPL-MCNC: 42 MG/DL
LDLC SERPL CALC-MCNC: 106 MG/DL
NONHDLC SERPL-MCNC: 125 MG/DL
POTASSIUM SERPL-SCNC: 4.1 MMOL/L (ref 3.4–5.3)
PROT SERPL-MCNC: 7.5 G/DL (ref 6.8–8.8)
SODIUM SERPL-SCNC: 136 MMOL/L (ref 133–144)
TRIGL SERPL-MCNC: 96 MG/DL
TSH SERPL DL<=0.005 MIU/L-ACNC: 0.72 MU/L (ref 0.4–4)

## 2020-09-24 NOTE — RESULT ENCOUNTER NOTE
Dear Sayda,     -LDL(bad) cholesterol level is just slightly elevated, HDL(good) cholesterol level is low which can increase your heart disease risk.  A diet high in fat and simple carbohydrates, genetics and being overweight can contribute to this. ADVISE: exercising 150 minutes of aerobic exercise per week (30 minutes for 5 days per week or 50 minutes for 3 days per week are options) and eating a low saturated fat/low carbohydrate diet are helpful to improve this. In 12 months, you should recheck your fasting cholesterol panel.  -Liver and gallbladder tests (ALT,AST, Alk phos,bilirubin) are normal.  -Kidney function (GFR) is normal.  -Sodium is normal.  -Potassium is normal.  -Calcium is decreased.  ADVISE: getting more calcium in your diet.  -Glucose (diabetic screening test) is normal.  -TSH (thyroid stimulating hormone) level is normal which indicates normal thyroid function.      Please send a [employer] message or call [phone]  if you have any questions.      Brittany Miles, CHE, CNP  [facility]    If you have further questions about the interpretation of your labs, [email or web address] is a good website to check out for further information.

## 2020-10-13 ENCOUNTER — OFFICE VISIT (OUTPATIENT)
Dept: CARDIOLOGY | Facility: CLINIC | Age: 35
End: 2020-10-13
Payer: COMMERCIAL

## 2020-10-13 VITALS
HEIGHT: 65 IN | HEART RATE: 77 BPM | DIASTOLIC BLOOD PRESSURE: 90 MMHG | WEIGHT: 237 LBS | SYSTOLIC BLOOD PRESSURE: 136 MMHG | BODY MASS INDEX: 39.49 KG/M2

## 2020-10-13 DIAGNOSIS — I63.213 CEREBROVASCULAR ACCIDENT (CVA) DUE TO BILATERAL OCCLUSION OF VERTEBRAL ARTERIES (H): ICD-10-CM

## 2020-10-13 DIAGNOSIS — R00.2 PALPITATIONS: Primary | ICD-10-CM

## 2020-10-13 DIAGNOSIS — I10 HYPERTENSION GOAL BP (BLOOD PRESSURE) < 140/90: ICD-10-CM

## 2020-10-13 DIAGNOSIS — Z01.810 ENCOUNTER FOR PRE-OPERATIVE CARDIOVASCULAR CLEARANCE: ICD-10-CM

## 2020-10-13 DIAGNOSIS — I77.89 AORTIC ROOT ENLARGEMENT (H): ICD-10-CM

## 2020-10-13 DIAGNOSIS — I67.1 BRAIN ANEURYSM: ICD-10-CM

## 2020-10-13 DIAGNOSIS — I77.74 VERTEBRAL ARTERY DISSECTION (H): ICD-10-CM

## 2020-10-13 DIAGNOSIS — Q21.12 PATENT FORAMEN OVALE: ICD-10-CM

## 2020-10-13 PROCEDURE — 93000 ELECTROCARDIOGRAM COMPLETE: CPT | Performed by: INTERNAL MEDICINE

## 2020-10-13 PROCEDURE — 99215 OFFICE O/P EST HI 40 MIN: CPT | Performed by: INTERNAL MEDICINE

## 2020-10-13 RX ORDER — IBUPROFEN 200 MG
200 TABLET ORAL EVERY 4 HOURS PRN
COMMUNITY
End: 2021-09-16

## 2020-10-13 SDOH — HEALTH STABILITY: MENTAL HEALTH: HOW OFTEN DO YOU HAVE A DRINK CONTAINING ALCOHOL?: NEVER

## 2020-10-13 ASSESSMENT — MIFFLIN-ST. JEOR: SCORE: 1770.9

## 2020-10-13 NOTE — PROGRESS NOTES
HPI and Plan:   I had the pleasure of seeing Mariela Valles in cardiology clinic in follow-up for palpitations in cardiac clearance prior to cord closure procedure to offer left intracranial aneurysm, pre-clinoid.    Denies previously seen my partners Dr. Henderson and Dr. Delgado.  She is 34 years old with early onset hypertension.  She is overweight.  Last June, she had presented with stroke.  CT head revealed left vertebral artery dissection and possibly healed right vertebral artery dissection.  She also was noted to have thickening of the left internal carotid artery small dissection.  She was seen by neurology at [facility].  The time of her presentation in June, she had bilateral cerebellar and left paramedian parietal areas of acute infarct.  A week prior, she had neck pain and underwent some massage.  2 weeks prior to that, she had gone to a chiropractor and had some manipulation.  It was felt that the left vertebral artery dissection could be related to manipulation versus FMD.  However, subsequent cerebral angiogram in January of this year, revealed healed vertebral arteries without any clear evidence of FMD.  The left internal carotid artery aneurysm was noted and she is now planning to under go coil closure of this aneurysm.     She is here for clearance for this procedure as well as to discuss her palpitations.  She has a longstanding intermittent palpitation form of very short lasting flutter or skipped beats.  In the past she has had some event monitoring but no clear arrhythmia were diagnosed except for occasional PVCs.  Now she has a skipped beats almost every day although there is some days when she is fine.  She has no dizziness or syncope associated with that.    About 2 years ago, she had longer-lasting palpitations which was about 1 minute which felt like rapid flutter.  This has not recurred.    She also has early onset hypertension and is on metoprolol XL 50 mg daily.  Her last echocardiogram in June  revealed borderline ascending aorta at 3.7 cm.  Ejection fraction was normal.  A bubble study was mildly positive.  She was seen by Dr. Delgado and he felt that bubble study was probably not clearly positive.  On my review, it appears mildly positive and therefore small PFO is likely.  She is on baby aspirin for that as well as her stroke.    Physical exam  See below    EKG today was reviewed by me and revealed sinus rhythm without ischemic changes.    Impression    1.  Palpitations, she has almost daily palpitations, skipped beats or flutter which last less than 30 seconds.  This not associate with any significant dizziness or syncope.  She is requesting a heart monitor again which I think is reasonable.  I will put her on ZIO Patch monitor for 10 days and have her follow-up with us after that.  Explained to her that in absence of any significant arrhythmias, we would continue metoprolol at this time.  These palpitations would not preclude us from clearing her from procedure of coil closure of her aneurysm.    2.  History of a stroke related to vertebral artery dissection and incidental finding of left internal carotid artery aneurysm    Although she had some neck massage and chiropractic manipulation 2 weeks before the presentation, the associated vertebral artery dissection and left ventricular aneurysm points towards possibility of FMD.  Therefore I would like her to see the vascular specialist, Dr. Roman.  Her early onset hypertension also is interesting.  It may be related to her weight but renal artery involvement may also need to be considered.  She is agreeable with meeting with the vascular cardiologist, Dr. Roman.    3.  Borderline ascending aorta dilatation 3.7 cm on echo  Given her body surface area, this may not be increase in size of the ascending aorta.  In any case, good blood pressure control would be recommended for now and a follow-up echocardiogram or CT thoracic aorta down the road.    4.   Small patent foramen ovale on bubble study on echo done in June 2019  At this time, the patent foramen ovale does not appear to be related to her stroke in June 2019.  This may be an incidental finding.  I would continue aspirin for now.    Thank you for allowing us to part spent in care of this nice patient.  I will have her see Dr. Roman after the ZIO Patch monitor as she needs to see her anyways for question of FMD.    I do not anticipate the results of ZIO patch monitor to prove procure us from proceeding with coil closure of the aneurysm procedure.  However, I would like her to see the vascular cardiologist prior to the procedure.    Thank you for allowing us to part spent in care of this nice patient.    Sincerely,    Carlos Alberto Gardner MD          Orders Placed This Encounter   Procedures     Follow-Up with Cardiologist     EKG 12-lead complete w/read - Clinics (performed today)     Leadless EKG Monitor 3 to 14 Days       Orders Placed This Encounter   Medications     ibuprofen (ADVIL/MOTRIN) 200 MG tablet     Sig: Take 200 mg by mouth every 4 hours as needed for mild pain       There are no discontinued medications.      Encounter Diagnoses   Name Primary?     Brain aneurysm      Cerebrovascular accident (CVA) due to bilateral occlusion of vertebral arteries (H)      Hypertension goal BP (blood pressure) < 140/90      Borderline Aortic root enlargement (H)      Encounter for pre-operative cardiovascular clearance      Palpitations Yes     Vertebral artery dissection (H)      Patent foramen ovale        CURRENT MEDICATIONS:  Current Outpatient Medications   Medication Sig Dispense Refill     acetaminophen (TYLENOL) 500 MG tablet Take 1,000 mg by mouth 2 times daily as needed        albuterol (PROAIR HFA/PROVENTIL HFA/VENTOLIN HFA) 108 (90 Base) MCG/ACT inhaler Inhale 1-2 puffs into the lungs every 4 hours as needed for shortness of breath / dyspnea or wheezing 1 Inhaler 1     aspirin (ASA) 81 MG EC tablet Take 1  tablet (81 mg) by mouth daily       Camphor-Menthol 0.2-3.5 % LIQD Externally apply 1 spray topically       cyclobenzaprine (FLEXERIL) 10 MG tablet Take 1 tablet (10 mg) by mouth 2 times daily as needed for muscle spasms 10 tablet 0     ibuprofen (ADVIL/MOTRIN) 200 MG tablet Take 200 mg by mouth every 4 hours as needed for mild pain       loratadine (CLARITIN) 10 MG tablet Take 10 mg by mouth daily       metoprolol succinate ER (TOPROL-XL) 50 MG 24 hr tablet Take 1 tablet (50 mg) by mouth daily 90 tablet 3     Multiple Vitamins-Minerals (MULTIVITAMIN GUMMIES WOMENS) CHEW Smarty Pants Women's Complete       VITAMIN D PO Take 2,000 Int'l Units by mouth         ALLERGIES     Allergies   Allergen Reactions     Zithromax [Azithromycin Dihydrate] GI Disturbance       PAST MEDICAL HISTORY:  Past Medical History:   Diagnosis Date     Allergic rhinitis, cause unspecified     Allergic rhinitis     Aneurysm (H)     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 Provider to review and confirm.     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       PAST SURGICAL HISTORY:  Past Surgical History:   Procedure Laterality Date     NO HISTORY OF SURGERY         FAMILY HISTORY:  Family History   Problem Relation Age  of Onset     Lipids Father      Neurologic Disorder Father         epilepsy     Heart Disease Father 47        MI     Hypertension Father      Hyperlipidemia Father      Lipids Sister      Hyperlipidemia Sister      Breast Cancer Paternal Grandmother         3rd generation      Breast Cancer Paternal Aunt      Diabetes Mother         gestational      Asthma Mother      Diabetes Paternal Grandfather      Other Cancer Maternal Grandfather         Skin     Hyperlipidemia Paternal Half-Sister      Breast Cancer Other      Other Cancer Maternal Grandmother         Lung     Ovarian Cancer Maternal Grandmother      Lung Cancer Maternal Grandmother      Diabetes Maternal Uncle      Diabetes Maternal Uncle      Colon Cancer No family hx of        SOCIAL HISTORY:  Social History     Socioeconomic History     Marital status: Single     Spouse name: none     Number of children: None     Years of education: None     Highest education level: None   Occupational History     Occupation:  at [employer]      Employer: OTHER   Social Needs     Financial resource strain: None     Food insecurity     Worry: None     Inability: None     Transportation needs     Medical: None     Non-medical: None   Tobacco Use     Smoking status: Former Smoker     Packs/day: 0.50     Years: 2.00     Pack years: 1.00     Types: Cigarettes     Start date:      Quit date: 2001     Years since quittin.7     Smokeless tobacco: Never Used   Substance and Sexual Activity     Alcohol use: Never     Frequency: Never     Drug use: No     Sexual activity: Not Currently     Partners: Male     Birth control/protection: Condom   Lifestyle     Physical activity     Days per week: None     Minutes per session: None     Stress: None   Relationships     Social connections     Talks on phone: None     Gets together: None     Attends Cheondoism service: None     Active member of club or organization: None     Attends meetings of clubs or  "organizations: None     Relationship status: None     Intimate partner violence     Fear of current or ex partner: None     Emotionally abused: None     Physically abused: None     Forced sexual activity: None   Other Topics Concern      Service No     Blood Transfusions No     Caffeine Concern No     Occupational Exposure No     Hobby Hazards No     Sleep Concern No     Stress Concern No     Weight Concern Yes     Comment: Would like to lose more weight     Special Diet No     Back Care No     Exercise Yes     Comment: Moderate     Bike Helmet No     Seat Belt Yes     Comment: always      Self-Exams Yes     Comment: SBE encouraged monthly      Parent/sibling w/ CABG, MI or angioplasty before 65F 55M? Yes   Social History Narrative    Calcium - 2-3 dairy servings/ day     Menarche: at age 12    Menses: regular        Review of Systems:  Skin:  Negative       Eyes:  Positive for glasses at night for driving  ENT:  Negative      Respiratory:  Negative       Cardiovascular:    Positive for;palpitations;edema;lightheadedness;dizziness has had palpitations on and off for years, has been getting them more frequently - \"resets\" with cough ; has surgery coming up ; sometimes mild swelling in right ankle ; positional dizziness on occasion  Gastroenterology: Positive for heartburn diet related? increased of the last couple months  Genitourinary:  Negative      Musculoskeletal:  Positive for joint pain;back pain of the ankles  Neurologic:  Positive for numbness or tingling of hands left   Psychiatric:  Positive for anxiety situational  Heme/Lymph/Imm:  Positive for allergies seasonal  Endocrine:  Negative        Physical Exam:  Vitals: BP (!) 136/90   Pulse 77   Ht 1.651 m (5' 5\")   Wt 107.5 kg (237 lb)   BMI 39.44 kg/m      Constitutional:  in no acute distress        Skin:  warm and dry to the touch          Head:  normocephalic        Eyes:           Lymph:      ENT:  no pallor or cyanosis, dentition good    "     Neck:  JVP normal        Respiratory:  clear to auscultation         Cardiac: regular rhythm;normal S1 and S2     no presence of murmur                                                   GI:  not assessed this visit        Extremities and Muscular Skeletal:  no edema              Neurological:  no gross motor deficits        Psych:  Alert and Oriented x 3        CC  Brittany Miles, APRN CNP  [id number] [place] [id number]

## 2020-10-13 NOTE — LETTER
10/13/2020    Brittany Miles, APRN CNP  [address] [place] [address]    RE: Sayda Valles       Dear Colleague,    I had the pleasure of seeing Sayda Evanslo in the [facility].    HPI and Plan:   I had the pleasure of seeing Mariela Valles in cardiology clinic in follow-up for palpitations in cardiac clearance prior to cord closure procedure to offer left intracranial aneurysm, pre-clinoid.    Denies previously seen my partners Dr. Henderson and Dr. Delgado.  She is 34 years old with early onset hypertension.  She is overweight.  Last June, she had presented with stroke.  CT head revealed left vertebral artery dissection and possibly healed right vertebral artery dissection.  She also was noted to have thickening of the left internal carotid artery small dissection.  She was seen by neurology at [facility].  The time of her presentation in June, she had bilateral cerebellar and left paramedian parietal areas of acute infarct.  A week prior, she had neck pain and underwent some massage.  2 weeks prior to that, she had gone to a chiropractor and had some manipulation.  It was felt that the left vertebral artery dissection could be related to manipulation versus FMD.  However, subsequent cerebral angiogram in January of this year, revealed healed vertebral arteries without any clear evidence of FMD.  The left internal carotid artery aneurysm was noted and she is now planning to under go coil closure of this aneurysm.     She is here for clearance for this procedure as well as to discuss her palpitations.  She has a longstanding intermittent palpitation form of very short lasting flutter or skipped beats.  In the past she has had some event monitoring but no clear arrhythmia were diagnosed except for occasional PVCs.  Now she has a skipped beats almost every day although there is some days when she is fine.  She has no dizziness or syncope associated with that.    About 2 years ago, she had  longer-lasting palpitations which was about 1 minute which felt like rapid flutter.  This has not recurred.    She also has early onset hypertension and is on metoprolol XL 50 mg daily.  Her last echocardiogram in June revealed borderline ascending aorta at 3.7 cm.  Ejection fraction was normal.  A bubble study was mildly positive.  She was seen by Dr. Delgado and he felt that bubble study was probably not clearly positive.  On my review, it appears mildly positive and therefore small PFO is likely.  She is on baby aspirin for that as well as her stroke.    Physical exam  See below    EKG today was reviewed by me and revealed sinus rhythm without ischemic changes.    Impression    1.  Palpitations, she has almost daily palpitations, skipped beats or flutter which last less than 30 seconds.  This not associate with any significant dizziness or syncope.  She is requesting a heart monitor again which I think is reasonable.  I will put her on ZIO Patch monitor for 10 days and have her follow-up with us after that.  Explained to her that in absence of any significant arrhythmias, we would continue metoprolol at this time.  These palpitations would not preclude us from clearing her from procedure of coil closure of her aneurysm.    2.  History of a stroke related to vertebral artery dissection and incidental finding of left internal carotid artery aneurysm    Although she had some neck massage and chiropractic manipulation 2 weeks before the presentation, the associated vertebral artery dissection and left ventricular aneurysm points towards possibility of FMD.  Therefore I would like her to see the vascular specialist, Dr. Roman.  Her early onset hypertension also is interesting.  It may be related to her weight but renal artery involvement may also need to be considered.  She is agreeable with meeting with the vascular cardiologist, Dr. Rmoan.    3.  Borderline ascending aorta dilatation 3.7 cm on echo  Given her body  surface area, this may not be increase in size of the ascending aorta.  In any case, good blood pressure control would be recommended for now and a follow-up echocardiogram or CT thoracic aorta down the road.    4.  Small patent foramen ovale on bubble study on echo done in June 2019  At this time, the patent foramen ovale does not appear to be related to her stroke in June 2019.  This may be an incidental finding.  I would continue aspirin for now.    Thank you for allowing us to part spent in care of this nice patient.  I will have her see Dr. Roman after the ZIO Patch monitor as she needs to see her anyways for question of FMD.    I do not anticipate the results of ZIO patch monitor to prove procure us from proceeding with coil closure of the aneurysm procedure.  However, I would like her to see the vascular cardiologist prior to the procedure.    Thank you for allowing us to part spent in care of this nice patient.    Sincerely,    Carlos Alberto Gardner MD          Orders Placed This Encounter   Procedures     Follow-Up with Cardiologist     EKG 12-lead complete w/read - Clinics (performed today)     Leadless EKG Monitor 3 to 14 Days       Orders Placed This Encounter   Medications     ibuprofen (ADVIL/MOTRIN) 200 MG tablet     Sig: Take 200 mg by mouth every 4 hours as needed for mild pain       There are no discontinued medications.      Encounter Diagnoses   Name Primary?     Brain aneurysm      Cerebrovascular accident (CVA) due to bilateral occlusion of vertebral arteries (H)      Hypertension goal BP (blood pressure) < 140/90      Borderline Aortic root enlargement (H)      Encounter for pre-operative cardiovascular clearance      Palpitations Yes     Vertebral artery dissection (H)      Patent foramen ovale        CURRENT MEDICATIONS:  Current Outpatient Medications   Medication Sig Dispense Refill     acetaminophen (TYLENOL) 500 MG tablet Take 1,000 mg by mouth 2 times daily as needed        albuterol (PROAIR  HFA/PROVENTIL HFA/VENTOLIN HFA) 108 (90 Base) MCG/ACT inhaler Inhale 1-2 puffs into the lungs every 4 hours as needed for shortness of breath / dyspnea or wheezing 1 Inhaler 1     aspirin (ASA) 81 MG EC tablet Take 1 tablet (81 mg) by mouth daily       Camphor-Menthol 0.2-3.5 % LIQD Externally apply 1 spray topically       cyclobenzaprine (FLEXERIL) 10 MG tablet Take 1 tablet (10 mg) by mouth 2 times daily as needed for muscle spasms 10 tablet 0     ibuprofen (ADVIL/MOTRIN) 200 MG tablet Take 200 mg by mouth every 4 hours as needed for mild pain       loratadine (CLARITIN) 10 MG tablet Take 10 mg by mouth daily       metoprolol succinate ER (TOPROL-XL) 50 MG 24 hr tablet Take 1 tablet (50 mg) by mouth daily 90 tablet 3     Multiple Vitamins-Minerals (MULTIVITAMIN GUMMIES WOMENS) CHEW Smarty Pants Women's Complete       VITAMIN D PO Take 2,000 Int'l Units by mouth         ALLERGIES     Allergies   Allergen Reactions     Zithromax [Azithromycin Dihydrate] GI Disturbance       PAST MEDICAL HISTORY:  Past Medical History:   Diagnosis Date     Allergic rhinitis, cause unspecified     Allergic rhinitis     Aneurysm (H)     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 Provider to review and confirm.     Family history of malignant neoplasm of breast     4 generations on her father's side     FAMILY HX BREAST MALIG      Heart palpitations 2/10    PVC's - dr sandhu     Hypertension goal BP (blood pressure) < 140/90 9/15/2020     Migraine without aura, with intractable migraine, so stated, without mention of status migrainosus     sees neurologist     Mild persistent asthma      Non morbid obesity due to excess calories 5/9/2016     Simple goiter 11/25/2008    pt has no enlargement and had normal blood work-up at that time     Stroke (H) 06/15/2019    due to L vert  artery dissection after chiropractor manipulation       PAST SURGICAL HISTORY:  Past Surgical History:   Procedure Laterality Date     NO HISTORY OF SURGERY         FAMILY HISTORY:  Family History   Problem Relation Age of Onset     Lipids Father      Neurologic Disorder Father         epilepsy     Heart Disease Father 47        MI     Hypertension Father      Hyperlipidemia Father      Lipids Sister      Hyperlipidemia Sister      Breast Cancer Paternal Grandmother         3rd generation      Breast Cancer Paternal Aunt      Diabetes Mother         gestational      Asthma Mother      Diabetes Paternal Grandfather      Other Cancer Maternal Grandfather         Skin     Hyperlipidemia Paternal Half-Sister      Breast Cancer Other      Other Cancer Maternal Grandmother         Lung     Ovarian Cancer Maternal Grandmother      Lung Cancer Maternal Grandmother      Diabetes Maternal Uncle      Diabetes Maternal Uncle      Colon Cancer No family hx of        SOCIAL HISTORY:  Social History     Socioeconomic History     Marital status: Single     Spouse name: none     Number of children: None     Years of education: None     Highest education level: None   Occupational History     Occupation:  at [employer]      Employer: OTHER   Social Needs     Financial resource strain: None     Food insecurity     Worry: None     Inability: None     Transportation needs     Medical: None     Non-medical: None   Tobacco Use     Smoking status: Former Smoker     Packs/day: 0.50     Years: 2.00     Pack years: 1.00     Types: Cigarettes     Start date:      Quit date: 2001     Years since quittin.7     Smokeless tobacco: Never Used   Substance and Sexual Activity     Alcohol use: Never     Frequency: Never     Drug use: No     Sexual activity: Not Currently     Partners: Male     Birth control/protection: Condom   Lifestyle     Physical activity     Days per week: None     Minutes per session: None      "Stress: None   Relationships     Social connections     Talks on phone: None     Gets together: None     Attends Anglican service: None     Active member of club or organization: None     Attends meetings of clubs or organizations: None     Relationship status: None     Intimate partner violence     Fear of current or ex partner: None     Emotionally abused: None     Physically abused: None     Forced sexual activity: None   Other Topics Concern      Service No     Blood Transfusions No     Caffeine Concern No     Occupational Exposure No     Hobby Hazards No     Sleep Concern No     Stress Concern No     Weight Concern Yes     Comment: Would like to lose more weight     Special Diet No     Back Care No     Exercise Yes     Comment: Moderate     Bike Helmet No     Seat Belt Yes     Comment: always      Self-Exams Yes     Comment: SBE encouraged monthly      Parent/sibling w/ CABG, MI or angioplasty before 65F 55M? Yes   Social History Narrative    Calcium - 2-3 dairy servings/ day     Menarche: at age 12    Menses: regular        Review of Systems:  Skin:  Negative       Eyes:  Positive for glasses at night for driving  ENT:  Negative      Respiratory:  Negative       Cardiovascular:    Positive for;palpitations;edema;lightheadedness;dizziness has had palpitations on and off for years, has been getting them more frequently - \"resets\" with cough ; has surgery coming up ; sometimes mild swelling in right ankle ; positional dizziness on occasion  Gastroenterology: Positive for heartburn diet related? increased of the last couple months  Genitourinary:  Negative      Musculoskeletal:  Positive for joint pain;back pain of the ankles  Neurologic:  Positive for numbness or tingling of hands left   Psychiatric:  Positive for anxiety situational  Heme/Lymph/Imm:  Positive for allergies seasonal  Endocrine:  Negative        Physical Exam:  Vitals: BP (!) 136/90   Pulse 77   Ht 1.651 m (5' 5\")   Wt 107.5 kg (237 " lb)   BMI 39.44 kg/m      Constitutional:  in no acute distress        Skin:  warm and dry to the touch          Head:  normocephalic        Eyes:           Lymph:      ENT:  no pallor or cyanosis, dentition good        Neck:  JVP normal        Respiratory:  clear to auscultation         Cardiac: regular rhythm;normal S1 and S2     no presence of murmur                                                   GI:  not assessed this visit        Extremities and Muscular Skeletal:  no edema              Neurological:  no gross motor deficits        Psych:  Alert and Oriented x 3          Thank you for allowing me to participate in the care of your patient.    Sincerely,     Carlos Alberto Gardner MD     [facility]

## 2020-10-19 ENCOUNTER — HOSPITAL ENCOUNTER (OUTPATIENT)
Dept: CARDIOLOGY | Facility: CLINIC | Age: 35
Discharge: HOME | End: 2020-10-19
Attending: INTERNAL MEDICINE | Admitting: INTERNAL MEDICINE
Payer: COMMERCIAL

## 2020-10-19 DIAGNOSIS — R00.2 PALPITATIONS: ICD-10-CM

## 2020-10-19 PROCEDURE — 0296T LEADLESS EKG MONITOR 3 TO 14 DAYS: CPT

## 2020-10-19 PROCEDURE — 0298T LEADLESS EKG MONITOR 3 TO 14 DAYS: CPT | Performed by: INTERNAL MEDICINE

## 2020-11-11 ENCOUNTER — TELEPHONE (OUTPATIENT)
Dept: CARDIOLOGY | Facility: CLINIC | Age: 35
End: 2020-11-11

## 2020-11-11 NOTE — TELEPHONE ENCOUNTER
Pt called in for Holter results. Informed her it did show triggered events with Pac's, and PVC's. Pt sees DR Roman 11/17/20. JOHN Laureano rN

## 2020-11-16 ENCOUNTER — HEALTH MAINTENANCE LETTER (OUTPATIENT)
Age: 35
End: 2020-11-16

## 2020-11-17 ENCOUNTER — OFFICE VISIT (OUTPATIENT)
Dept: CARDIOLOGY | Facility: CLINIC | Age: 35
End: 2020-11-17
Payer: COMMERCIAL

## 2020-11-17 VITALS
HEIGHT: 65 IN | WEIGHT: 236 LBS | BODY MASS INDEX: 39.32 KG/M2 | SYSTOLIC BLOOD PRESSURE: 127 MMHG | HEART RATE: 82 BPM | DIASTOLIC BLOOD PRESSURE: 88 MMHG

## 2020-11-17 DIAGNOSIS — I77.74 VERTEBRAL ARTERY DISSECTION (H): Primary | ICD-10-CM

## 2020-11-17 PROCEDURE — 99204 OFFICE O/P NEW MOD 45 MIN: CPT | Performed by: INTERNAL MEDICINE

## 2020-11-17 ASSESSMENT — MIFFLIN-ST. JEOR: SCORE: 1761.37

## 2020-11-17 NOTE — PATIENT INSTRUCTIONS
1. CT angiogram chest/abdomen/pelvis   2. 3 week follow up with Dr. Abel holcomb   3. Will discuss case with Dr. Spring   4. Continue aspirin and metoprolol   5. Can take 25 mg benadryl before CT   6. Once a year labs

## 2020-11-17 NOTE — LETTER
11/17/2020    Brittany Miles, APRN CNP  [address] [place] [address]    RE: Sayda Valles       Dear Colleague,    I had the pleasure of seeing Sayda Evanslo in the [facility].          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      Leading up to her stroke she was seeing chiropractor multiple times 1-2 times per week.    She also has palpitations, frequently. No syncope. No longer drinking caffeine. Had ziopatch in October demonstrating PACs/PVCs associated with symptoms.     Has long standing hypertension that got worse after her stroke. She has never had work up for FMD in the abdomen/pelvis and was told it wasn't needed. On metoprolol XL 50 mg daily, also on aspirin 81 mg daily. Echocardiogram showed small PFO, no LVH or HCM, and ascending aorta at 3.7 cm which is mildly dilated for her height. EKG has been overall unremarkable.     Family history reviewed. Her father had heart attack in his 40s. Grandfather had stroke in his 70s. Paternal uncle with heart attack in his 40s. Has a 15 year old daughter who is healthy. 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 Does not smoke or drink heavily. She has been isolating with COVID pandemic.     On ROS: no lens dislocation, normal eye-width, normal uvula, normal palate, no hypermobility in thumb joints, no skin translucency, normal skin, no vaginal prolapse or hernias, + abnormal wound healing, +easy bruising, no chest wall deformities, no dental crowding, normal stature. No scoliosis.     CT Imaging of her brain was reviewed:                                                       IMPRESSION:  1. Improved patency of the V2, V3 and  "proximal V4 segments of the  right vertebral artery in the setting of previous dissection.  Persistent mild eccentric wall thickening representing small residual  intramural hematoma without flow limiting stenosis.  2. Stable appearance of the dissection with associated small  pseudoaneurysm involving the V1 segment of the left vertebral artery.  3. Unchanged medially directed small saccular aneurysm involving the  distal cavernous segment of the left internal carotid artery.    ASSESSMENT/PLAN:    This is a pleasant 35 year old female with vertebral artery dissections and stroke who is here for evaluation of vascular connective tissue disorder. She was found to have also a mildly dilated aorta at 3.7 cm for her height. Her family history is significant for stroke and early heart attacks, no sudden cardiac death that is known. On ROS she has poor wound healing, no joint hypermobility. Palpitations are also very common in patients with genetic vasculopathies. Echocardiogram reviewed and no valvular pathology (specifically, mitral valve prolapse or annular dysjunction).    Fibromuscular dysplasia we discussed can affect the renal, carotid, vertebral, iliac, and coronary arteries. Commonly it causes string-of-bead appearance on imaging but that is only one variant that exists, and other less common variants include those that (in the absence of string of bead appearance) cause vascular loops, fusiform vascular ectasia, spontaneous arterial dissection, aneurysmal dilations. Given these findings are present on her imaging, even without \"classic string of beads\", and she is female, young and with hypertension I suspect FMD in her and this should be considered prior to her undergoing repeated angiogram and aneurysmal coiling.     Alternative diagnoses include syndromic vascular fragility syndromes that can lead to dissection from head manipulation, including LDS, EDS, and these are associated with aortic dilation as well. " These could be considered given early stroke and heart attack in the family. Often genetic vasculopathies have been associated with early coronary artery disease and atherosclerosis.     Recommendations:  1. CT angiogram chest/abdomen and pelvis to screen for other vascular involvement, in particular the renal arteries. If abnormal, will obtain hemodynamic testing with vascular ultrasound of the kidneys   2. Recommend daily aspirin indefinitely and beta blocker therapy which she is on   3. Aortic sizing by CT to confirm diameter   4. Will discuss case with neuro interventional   5. Follow up in 3 weeks to discuss above by virtual visit   6. Large varicose vein - can obtain full vein competency study at future visit       Alexandra Roman MD MSc  [facility]            PAST MEDICAL HISTORY  Past Medical History:   Diagnosis Date     Allergic rhinitis, cause unspecified     Allergic rhinitis     Aneurysm (H)     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 Provider to review and confirm.     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       CURRENT MEDICATIONS  Current Outpatient Medications   Medication Sig Dispense Refill     acetaminophen (TYLENOL) 500 MG tablet Take 1,000 mg by mouth  2 times daily as needed        albuterol (PROAIR HFA/PROVENTIL HFA/VENTOLIN HFA) 108 (90 Base) MCG/ACT inhaler Inhale 1-2 puffs into the lungs every 4 hours as needed for shortness of breath / dyspnea or wheezing 1 Inhaler 1     aspirin (ASA) 81 MG EC tablet Take 1 tablet (81 mg) by mouth daily       Camphor-Menthol 0.2-3.5 % LIQD Externally apply 1 spray topically       cyclobenzaprine (FLEXERIL) 10 MG tablet Take 1 tablet (10 mg) by mouth 2 times daily as needed for muscle spasms 10 tablet 0     ibuprofen (ADVIL/MOTRIN) 200 MG tablet Take 200 mg by mouth every 4 hours as needed for mild pain       loratadine (CLARITIN) 10 MG tablet Take 10 mg by mouth daily       metoprolol succinate ER (TOPROL-XL) 50 MG 24 hr tablet Take 1 tablet (50 mg) by mouth daily 90 tablet 3     Multiple Vitamins-Minerals (MULTIVITAMIN GUMMIES WOMENS) CHEW Smarty Pants Women's Complete       VITAMIN D PO Take 2,000 Int'l Units by mouth         PAST SURGICAL HISTORY:  Past Surgical History:   Procedure Laterality Date     NO HISTORY OF SURGERY         ALLERGIES     Allergies   Allergen Reactions     Zithromax [Azithromycin Dihydrate] GI Disturbance       FAMILY HISTORY  Family History   Problem Relation Age of Onset     Lipids Father      Neurologic Disorder Father         epilepsy     Heart Disease Father 47        MI     Hypertension Father      Hyperlipidemia Father      Lipids Sister      Hyperlipidemia Sister      Breast Cancer Paternal Grandmother         3rd generation      Breast Cancer Paternal Aunt      Diabetes Mother         gestational      Asthma Mother      Diabetes Paternal Grandfather      Other Cancer Maternal Grandfather         Skin     Hyperlipidemia Paternal Half-Sister      Breast Cancer Other      Other Cancer Maternal Grandmother         Lung     Ovarian Cancer Maternal Grandmother      Lung Cancer Maternal Grandmother      Diabetes Maternal Uncle      Diabetes Maternal Uncle      Colon Cancer No family hx of           SOCIAL HISTORY  Social History     Socioeconomic History     Marital status: Single     Spouse name: none     Number of children: Not on file     Years of education: Not on file     Highest education level: Not on file   Occupational History     Occupation:  at [employer]      Employer: OTHER   Social Needs     Financial resource strain: Not on file     Food insecurity     Worry: Not on file     Inability: Not on file     Transportation needs     Medical: Not on file     Non-medical: Not on file   Tobacco Use     Smoking status: Former Smoker     Packs/day: 0.50     Years: 2.00     Pack years: 1.00     Types: Cigarettes     Start date:      Quit date: 2001     Years since quittin.8     Smokeless tobacco: Never Used   Substance and Sexual Activity     Alcohol use: Never     Frequency: Never     Drug use: No     Sexual activity: Not Currently     Partners: Male     Birth control/protection: Condom   Lifestyle     Physical activity     Days per week: Not on file     Minutes per session: Not on file     Stress: Not on file   Relationships     Social connections     Talks on phone: Not on file     Gets together: Not on file     Attends Anabaptist service: Not on file     Active member of club or organization: Not on file     Attends meetings of clubs or organizations: Not on file     Relationship status: Not on file     Intimate partner violence     Fear of current or ex partner: Not on file     Emotionally abused: Not on file     Physically abused: Not on file     Forced sexual activity: Not on file   Other Topics Concern      Service No     Blood Transfusions No     Caffeine Concern No     Occupational Exposure No     Hobby Hazards No     Sleep Concern No     Stress Concern No     Weight Concern Yes     Comment: Would like to lose more weight     Special Diet No     Back Care No     Exercise Yes     Comment: Moderate     Bike Helmet No     Seat Belt Yes     Comment:  "always      Self-Exams Yes     Comment: SBE encouraged monthly      Parent/sibling w/ CABG, MI or angioplasty before 65F 55M? Yes   Social History Narrative    Calcium - 2-3 dairy servings/ day     Menarche: at age 12    Menses: regular        ROS:   Constitutional: No fever, chills, or sweats. No weight gain/loss   ENT: No visual disturbance, ear ache, epistaxis, sore throat  Allergies/Immunologic: Negative  Respiratory: No cough, hemoptysia  Cardiovascular: As per HPI  GI: No nausea, vomiting, hematemesis, melena, or hematochezia  : No urinary frequency, dysuria, or hematuria  Integument: Negative  Psychiatric: Negative  Neuro: Negative  Endocrinology: Negative   Musculoskeletal: Negative  Vascular: No walking impairment, claudication, ischemic rest pain or nonhealing wounds    EXAM:  /88   Pulse 82   Ht 1.651 m (5' 5\")   Wt 107 kg (236 lb)   BMI 39.27 kg/m    In general, the patient is a pleasant female in no apparent distress.    HEENT: NC/AT.  PERRLA.  EOMI.  Sclerae white, not injected.  Nares clear.  Pharynx without erythema or exudate.  Dentition intact.    Neck: No adenopathy.  No thyromegaly. Carotids +2/2 bilaterally without bruits.  No jugular venous distension.   Heart: RRR. Normal S1, S2 splits physiologically. No murmur, rub, click, or gallop. The PMI is in the 5th ICS in the midclavicular line. There is no heave.    Lungs: CTA.  No ronchi, wheezes, rales.  No dullness to percussion.   Abdomen: Soft, nontender, nondistended. No organomegaly. No AAA.  No bruits.   Extremities: No clubbing, cyanosis, or edema.  No wounds. + varicose veins signs of chronic venous insufficiency.   Vascular: No bruits are noted.    Labs:  LIPID RESULTS:  Lab Results   Component Value Date    CHOL 167 09/22/2020    HDL 42 (L) 09/22/2020     (H) 09/22/2020    TRIG 96 09/22/2020    CHOLHDLRATIO 3.8 07/21/2014    NHDL 125 09/22/2020       LIVER ENZYME RESULTS:  Lab Results   Component Value Date    AST 13 " 09/22/2020    ALT 23 09/22/2020       CBC RESULTS:  Lab Results   Component Value Date    WBC 7.7 06/16/2019    RBC 4.82 06/16/2019    HGB 12.0 06/16/2019    HCT 38.8 06/16/2019    MCV 81 06/16/2019    MCH 24.9 (L) 06/16/2019    MCHC 30.9 (L) 06/16/2019    RDW 13.7 06/16/2019     06/16/2019       BMP RESULTS:  Lab Results   Component Value Date     09/22/2020    POTASSIUM 4.1 09/22/2020    CHLORIDE 109 09/22/2020    CO2 21 09/22/2020    ANIONGAP 6 09/22/2020    GLC 87 09/22/2020    BUN 9 09/22/2020    CR 0.70 09/22/2020    GFRESTIMATED >90 09/22/2020    GFRESTBLACK >90 09/22/2020    PRANAV 8.3 (L) 09/22/2020        A1C RESULTS:  Lab Results   Component Value Date    A1C 5.4 06/11/2003             Thank you for allowing me to participate in the care of your patient.      Sincerely,     Alexandra Roman MD     [facility]    cc:   No referring provider defined for this encounter.

## 2020-11-17 NOTE — LETTER
11/17/2020    Brittany Miles, APRN CNP  [address] [place] [address]    RE: Sayda Valles       Dear Colleague,    I had the pleasure of seeing Sayda Evanslo in the [facility].      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      Leading up to her stroke she was seeing chiropractor multiple times 1-2 times per week.    She also has palpitations, frequently. No syncope. No longer drinking caffeine. Had ziopatch in October demonstrating PACs/PVCs associated with symptoms.     Has long standing hypertension that got worse after her stroke. She has never had work up for FMD in the abdomen/pelvis and was told it wasn't needed. On metoprolol XL 50 mg daily, also on aspirin 81 mg daily. Echocardiogram showed small PFO, no LVH or HCM, and ascending aorta at 3.7 cm which is mildly dilated for her height. EKG has been overall unremarkable.     Family history reviewed. Her father had heart attack in his 40s. Grandfather had stroke in his 70s. Paternal uncle with heart attack in his 40s. Has a 15 year old daughter who is healthy. 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 Does not smoke or drink heavily. She has been isolating with COVID pandemic.     On ROS: no lens dislocation, normal eye-width, normal uvula, normal palate, no hypermobility in thumb joints, no skin translucency, normal skin, no vaginal prolapse or hernias, + abnormal wound healing, +easy bruising, no chest wall deformities, no dental crowding, normal stature. No scoliosis.     CT Imaging of her brain was reviewed:                                                       IMPRESSION:  1. Improved patency of the V2, V3 and proximal  "V4 segments of the  right vertebral artery in the setting of previous dissection.  Persistent mild eccentric wall thickening representing small residual  intramural hematoma without flow limiting stenosis.  2. Stable appearance of the dissection with associated small  pseudoaneurysm involving the V1 segment of the left vertebral artery.  3. Unchanged medially directed small saccular aneurysm involving the  distal cavernous segment of the left internal carotid artery.    ASSESSMENT/PLAN:    This is a pleasant 35 year old female with vertebral artery dissections and stroke who is here for evaluation of vascular connective tissue disorder. She was found to have also a mildly dilated aorta at 3.7 cm for her height. Her family history is significant for stroke and early heart attacks, no sudden cardiac death that is known. On ROS she has poor wound healing, no joint hypermobility. Palpitations are also very common in patients with genetic vasculopathies. Echocardiogram reviewed and no valvular pathology (specifically, mitral valve prolapse or annular dysjunction).    Fibromuscular dysplasia we discussed can affect the renal, carotid, vertebral, iliac, and coronary arteries. Commonly it causes string-of-bead appearance on imaging but that is only one variant that exists, and other less common variants include those that (in the absence of string of bead appearance) cause vascular loops, fusiform vascular ectasia, spontaneous arterial dissection, aneurysmal dilations. Given these findings are present on her imaging, even without \"classic string of beads\", and she is female, young and with hypertension I suspect FMD in her and this should be considered prior to her undergoing repeated angiogram and aneurysmal coiling.     Alternative diagnoses include syndromic vascular fragility syndromes that can lead to dissection from head manipulation, including LDS, EDS, and these are associated with aortic dilation as well. These " could be considered given early stroke and heart attack in the family. Often genetic vasculopathies have been associated with early coronary artery disease and atherosclerosis.     Recommendations:  1. CT angiogram chest/abdomen and pelvis to screen for other vascular involvement, in particular the renal arteries. If abnormal, will obtain hemodynamic testing with vascular ultrasound of the kidneys   2. Recommend daily aspirin indefinitely and beta blocker therapy which she is on   3. Aortic sizing by CT to confirm diameter   4. Will discuss case with neuro interventional   5. Follow up in 3 weeks to discuss above by virtual visit   6. Large varicose vein - can obtain full vein competency study at future visit       Alexandra Roman MD MSc  [facility]            PAST MEDICAL HISTORY  Past Medical History:   Diagnosis Date     Allergic rhinitis, cause unspecified     Allergic rhinitis     Aneurysm (H)     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 Provider to review and confirm.     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       CURRENT MEDICATIONS  Current Outpatient Medications   Medication Sig Dispense Refill     acetaminophen (TYLENOL) 500 MG tablet Take 1,000 mg by mouth 2  times daily as needed        albuterol (PROAIR HFA/PROVENTIL HFA/VENTOLIN HFA) 108 (90 Base) MCG/ACT inhaler Inhale 1-2 puffs into the lungs every 4 hours as needed for shortness of breath / dyspnea or wheezing 1 Inhaler 1     aspirin (ASA) 81 MG EC tablet Take 1 tablet (81 mg) by mouth daily       Camphor-Menthol 0.2-3.5 % LIQD Externally apply 1 spray topically       cyclobenzaprine (FLEXERIL) 10 MG tablet Take 1 tablet (10 mg) by mouth 2 times daily as needed for muscle spasms 10 tablet 0     ibuprofen (ADVIL/MOTRIN) 200 MG tablet Take 200 mg by mouth every 4 hours as needed for mild pain       loratadine (CLARITIN) 10 MG tablet Take 10 mg by mouth daily       metoprolol succinate ER (TOPROL-XL) 50 MG 24 hr tablet Take 1 tablet (50 mg) by mouth daily 90 tablet 3     Multiple Vitamins-Minerals (MULTIVITAMIN GUMMIES WOMENS) CHEW Smarty Pants Women's Complete       VITAMIN D PO Take 2,000 Int'l Units by mouth         PAST SURGICAL HISTORY:  Past Surgical History:   Procedure Laterality Date     NO HISTORY OF SURGERY         ALLERGIES     Allergies   Allergen Reactions     Zithromax [Azithromycin Dihydrate] GI Disturbance       FAMILY HISTORY  Family History   Problem Relation Age of Onset     Lipids Father      Neurologic Disorder Father         epilepsy     Heart Disease Father 47        MI     Hypertension Father      Hyperlipidemia Father      Lipids Sister      Hyperlipidemia Sister      Breast Cancer Paternal Grandmother         3rd generation      Breast Cancer Paternal Aunt      Diabetes Mother         gestational      Asthma Mother      Diabetes Paternal Grandfather      Other Cancer Maternal Grandfather         Skin     Hyperlipidemia Paternal Half-Sister      Breast Cancer Other      Other Cancer Maternal Grandmother         Lung     Ovarian Cancer Maternal Grandmother      Lung Cancer Maternal Grandmother      Diabetes Maternal Uncle      Diabetes Maternal Uncle      Colon Cancer No family hx of           SOCIAL HISTORY  Social History     Socioeconomic History     Marital status: Single     Spouse name: none     Number of children: Not on file     Years of education: Not on file     Highest education level: Not on file   Occupational History     Occupation:  at [employer]      Employer: OTHER   Social Needs     Financial resource strain: Not on file     Food insecurity     Worry: Not on file     Inability: Not on file     Transportation needs     Medical: Not on file     Non-medical: Not on file   Tobacco Use     Smoking status: Former Smoker     Packs/day: 0.50     Years: 2.00     Pack years: 1.00     Types: Cigarettes     Start date:      Quit date: 2001     Years since quittin.8     Smokeless tobacco: Never Used   Substance and Sexual Activity     Alcohol use: Never     Frequency: Never     Drug use: No     Sexual activity: Not Currently     Partners: Male     Birth control/protection: Condom   Lifestyle     Physical activity     Days per week: Not on file     Minutes per session: Not on file     Stress: Not on file   Relationships     Social connections     Talks on phone: Not on file     Gets together: Not on file     Attends Yazidi service: Not on file     Active member of club or organization: Not on file     Attends meetings of clubs or organizations: Not on file     Relationship status: Not on file     Intimate partner violence     Fear of current or ex partner: Not on file     Emotionally abused: Not on file     Physically abused: Not on file     Forced sexual activity: Not on file   Other Topics Concern      Service No     Blood Transfusions No     Caffeine Concern No     Occupational Exposure No     Hobby Hazards No     Sleep Concern No     Stress Concern No     Weight Concern Yes     Comment: Would like to lose more weight     Special Diet No     Back Care No     Exercise Yes     Comment: Moderate     Bike Helmet No     Seat Belt Yes     Comment:  "always      Self-Exams Yes     Comment: SBE encouraged monthly      Parent/sibling w/ CABG, MI or angioplasty before 65F 55M? Yes   Social History Narrative    Calcium - 2-3 dairy servings/ day     Menarche: at age 12    Menses: regular        ROS:   Constitutional: No fever, chills, or sweats. No weight gain/loss   ENT: No visual disturbance, ear ache, epistaxis, sore throat  Allergies/Immunologic: Negative  Respiratory: No cough, hemoptysia  Cardiovascular: As per HPI  GI: No nausea, vomiting, hematemesis, melena, or hematochezia  : No urinary frequency, dysuria, or hematuria  Integument: Negative  Psychiatric: Negative  Neuro: Negative  Endocrinology: Negative   Musculoskeletal: Negative  Vascular: No walking impairment, claudication, ischemic rest pain or nonhealing wounds    EXAM:  /88   Pulse 82   Ht 1.651 m (5' 5\")   Wt 107 kg (236 lb)   BMI 39.27 kg/m    In general, the patient is a pleasant female in no apparent distress.    HEENT: NC/AT.  PERRLA.  EOMI.  Sclerae white, not injected.  Nares clear.  Pharynx without erythema or exudate.  Dentition intact.    Neck: No adenopathy.  No thyromegaly. Carotids +2/2 bilaterally without bruits.  No jugular venous distension.   Heart: RRR. Normal S1, S2 splits physiologically. No murmur, rub, click, or gallop. The PMI is in the 5th ICS in the midclavicular line. There is no heave.    Lungs: CTA.  No ronchi, wheezes, rales.  No dullness to percussion.   Abdomen: Soft, nontender, nondistended. No organomegaly. No AAA.  No bruits.   Extremities: No clubbing, cyanosis, or edema.  No wounds. + varicose veins signs of chronic venous insufficiency.   Vascular: No bruits are noted.    Labs:  LIPID RESULTS:  Lab Results   Component Value Date    CHOL 167 09/22/2020    HDL 42 (L) 09/22/2020     (H) 09/22/2020    TRIG 96 09/22/2020    CHOLHDLRATIO 3.8 07/21/2014    NHDL 125 09/22/2020       LIVER ENZYME RESULTS:  Lab Results   Component Value Date    AST 13 " 09/22/2020    ALT 23 09/22/2020       CBC RESULTS:  Lab Results   Component Value Date    WBC 7.7 06/16/2019    RBC 4.82 06/16/2019    HGB 12.0 06/16/2019    HCT 38.8 06/16/2019    MCV 81 06/16/2019    MCH 24.9 (L) 06/16/2019    MCHC 30.9 (L) 06/16/2019    RDW 13.7 06/16/2019     06/16/2019       BMP RESULTS:  Lab Results   Component Value Date     09/22/2020    POTASSIUM 4.1 09/22/2020    CHLORIDE 109 09/22/2020    CO2 21 09/22/2020    ANIONGAP 6 09/22/2020    GLC 87 09/22/2020    BUN 9 09/22/2020    CR 0.70 09/22/2020    GFRESTIMATED >90 09/22/2020    GFRESTBLACK >90 09/22/2020    PRANAV 8.3 (L) 09/22/2020        A1C RESULTS:  Lab Results   Component Value Date    A1C 5.4 06/11/2003         Thank you for allowing me to participate in the care of your patient.    Sincerely,     Alexandra Roman MD     [facility]

## 2020-11-17 NOTE — PROGRESS NOTES
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      Leading up to her stroke she was seeing chiropractor multiple times 1-2 times per week.    She also has palpitations, frequently. No syncope. No longer drinking caffeine. Had ziopatch in October demonstrating PACs/PVCs associated with symptoms.     Has long standing hypertension that got worse after her stroke. She has never had work up for FMD in the abdomen/pelvis and was told it wasn't needed. On metoprolol XL 50 mg daily, also on aspirin 81 mg daily. Echocardiogram showed small PFO, no LVH or HCM, and ascending aorta at 3.7 cm which is mildly dilated for her height. EKG has been overall unremarkable.     Family history reviewed. Her father had heart attack in his 40s. Grandfather had stroke in his 70s. Paternal uncle with heart attack in his 40s. Has a 15 year old daughter who is healthy. 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 Does not smoke or drink heavily. She has been isolating with COVID pandemic.     On ROS: no lens dislocation, normal eye-width, normal uvula, normal palate, no hypermobility in thumb joints, no skin translucency, normal skin, no vaginal prolapse or hernias, + abnormal wound healing, +easy bruising, no chest wall deformities, no dental crowding, normal stature. No scoliosis.     CT Imaging of her brain was reviewed:                                                       IMPRESSION:  1. Improved patency of the V2, V3 and proximal V4 segments of the  right vertebral artery in the setting of previous dissection.  Persistent mild eccentric wall thickening representing small residual  intramural hematoma without flow limiting stenosis.  2.  "Stable appearance of the dissection with associated small  pseudoaneurysm involving the V1 segment of the left vertebral artery.  3. Unchanged medially directed small saccular aneurysm involving the  distal cavernous segment of the left internal carotid artery.    ASSESSMENT/PLAN:    This is a pleasant 35 year old female with vertebral artery dissections and stroke who is here for evaluation of vascular connective tissue disorder. She was found to have also a mildly dilated aorta at 3.7 cm for her height. Her family history is significant for stroke and early heart attacks, no sudden cardiac death that is known. On ROS she has poor wound healing, no joint hypermobility. Palpitations are also very common in patients with genetic vasculopathies. Echocardiogram reviewed and no valvular pathology (specifically, mitral valve prolapse or annular dysjunction).    Fibromuscular dysplasia we discussed can affect the renal, carotid, vertebral, iliac, and coronary arteries. Commonly it causes string-of-bead appearance on imaging but that is only one variant that exists, and other less common variants include those that (in the absence of string of bead appearance) cause vascular loops, fusiform vascular ectasia, spontaneous arterial dissection, aneurysmal dilations. Given these findings are present on her imaging, even without \"classic string of beads\", and she is female, young and with hypertension I suspect FMD in her and this should be considered prior to her undergoing repeated angiogram and aneurysmal coiling.     Alternative diagnoses include syndromic vascular fragility syndromes that can lead to dissection from head manipulation, including LDS, EDS, and these are associated with aortic dilation as well. These could be considered given early stroke and heart attack in the family. Often genetic vasculopathies have been associated with early coronary artery disease and atherosclerosis.     Recommendations:  1. CT " angiogram chest/abdomen and pelvis to screen for other vascular involvement, in particular the renal arteries. If abnormal, will obtain hemodynamic testing with vascular ultrasound of the kidneys   2. Recommend daily aspirin indefinitely and beta blocker therapy which she is on   3. Aortic sizing by CT to confirm diameter   4. Will discuss case with neuro interventional   5. Follow up in 3 weeks to discuss above by virtual visit   6. Large varicose vein - can obtain full vein competency study at future visit       Alexandra Roman MD MSc  [facility]            PAST MEDICAL HISTORY  Past Medical History:   Diagnosis Date     Allergic rhinitis, cause unspecified     Allergic rhinitis     Aneurysm (H)     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 Provider to review and confirm.     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       CURRENT MEDICATIONS  Current Outpatient Medications   Medication Sig Dispense Refill     acetaminophen (TYLENOL) 500 MG tablet Take 1,000 mg by mouth 2 times daily as needed        albuterol (PROAIR HFA/PROVENTIL HFA/VENTOLIN HFA) 108 (90 Base) MCG/ACT inhaler Inhale 1-2 puffs into the lungs every 4 hours as needed for shortness of breath / dyspnea or  wheezing 1 Inhaler 1     aspirin (ASA) 81 MG EC tablet Take 1 tablet (81 mg) by mouth daily       Camphor-Menthol 0.2-3.5 % LIQD Externally apply 1 spray topically       cyclobenzaprine (FLEXERIL) 10 MG tablet Take 1 tablet (10 mg) by mouth 2 times daily as needed for muscle spasms 10 tablet 0     ibuprofen (ADVIL/MOTRIN) 200 MG tablet Take 200 mg by mouth every 4 hours as needed for mild pain       loratadine (CLARITIN) 10 MG tablet Take 10 mg by mouth daily       metoprolol succinate ER (TOPROL-XL) 50 MG 24 hr tablet Take 1 tablet (50 mg) by mouth daily 90 tablet 3     Multiple Vitamins-Minerals (MULTIVITAMIN GUMMIES WOMENS) CHEW Smarty Pants Women's Complete       VITAMIN D PO Take 2,000 Int'l Units by mouth         PAST SURGICAL HISTORY:  Past Surgical History:   Procedure Laterality Date     NO HISTORY OF SURGERY         ALLERGIES     Allergies   Allergen Reactions     Zithromax [Azithromycin Dihydrate] GI Disturbance       FAMILY HISTORY  Family History   Problem Relation Age of Onset     Lipids Father      Neurologic Disorder Father         epilepsy     Heart Disease Father 47        MI     Hypertension Father      Hyperlipidemia Father      Lipids Sister      Hyperlipidemia Sister      Breast Cancer Paternal Grandmother         3rd generation      Breast Cancer Paternal Aunt      Diabetes Mother         gestational      Asthma Mother      Diabetes Paternal Grandfather      Other Cancer Maternal Grandfather         Skin     Hyperlipidemia Paternal Half-Sister      Breast Cancer Other      Other Cancer Maternal Grandmother         Lung     Ovarian Cancer Maternal Grandmother      Lung Cancer Maternal Grandmother      Diabetes Maternal Uncle      Diabetes Maternal Uncle      Colon Cancer No family hx of          SOCIAL HISTORY  Social History     Socioeconomic History     Marital status: Single     Spouse name: none     Number of children: Not on file     Years of education: Not on file     Highest education  level: Not on file   Occupational History     Occupation:  at [employer]      Employer: OTHER   Social Needs     Financial resource strain: Not on file     Food insecurity     Worry: Not on file     Inability: Not on file     Transportation needs     Medical: Not on file     Non-medical: Not on file   Tobacco Use     Smoking status: Former Smoker     Packs/day: 0.50     Years: 2.00     Pack years: 1.00     Types: Cigarettes     Start date:      Quit date: 2001     Years since quittin.8     Smokeless tobacco: Never Used   Substance and Sexual Activity     Alcohol use: Never     Frequency: Never     Drug use: No     Sexual activity: Not Currently     Partners: Male     Birth control/protection: Condom   Lifestyle     Physical activity     Days per week: Not on file     Minutes per session: Not on file     Stress: Not on file   Relationships     Social connections     Talks on phone: Not on file     Gets together: Not on file     Attends Pentec[employer]al service: Not on file     Active member of club or organization: Not on file     Attends meetings of clubs or organizations: Not on file     Relationship status: Not on file     Intimate partner violence     Fear of current or ex partner: Not on file     Emotionally abused: Not on file     Physically abused: Not on file     Forced sexual activity: Not on file   Other Topics Concern      Service No     Blood Transfusions No     Caffeine Concern No     Occupational Exposure No     Hobby Hazards No     Sleep Concern No     Stress Concern No     Weight Concern Yes     Comment: Would like to lose more weight     Special Diet No     Back Care No     Exercise Yes     Comment: Moderate     Bike Helmet No     Seat Belt Yes     Comment: always      Self-Exams Yes     Comment: SBE encouraged monthly      Parent/sibling w/ CABG, MI or angioplasty before 65F 55M? Yes   Social History Narrative    Calcium - 2-3 dairy servings/ day     Menarche: at  "age 12    Menses: regular        ROS:   Constitutional: No fever, chills, or sweats. No weight gain/loss   ENT: No visual disturbance, ear ache, epistaxis, sore throat  Allergies/Immunologic: Negative  Respiratory: No cough, hemoptysia  Cardiovascular: As per HPI  GI: No nausea, vomiting, hematemesis, melena, or hematochezia  : No urinary frequency, dysuria, or hematuria  Integument: Negative  Psychiatric: Negative  Neuro: Negative  Endocrinology: Negative   Musculoskeletal: Negative  Vascular: No walking impairment, claudication, ischemic rest pain or nonhealing wounds    EXAM:  /88   Pulse 82   Ht 1.651 m (5' 5\")   Wt 107 kg (236 lb)   BMI 39.27 kg/m    In general, the patient is a pleasant female in no apparent distress.    HEENT: NC/AT.  PERRLA.  EOMI.  Sclerae white, not injected.  Nares clear.  Pharynx without erythema or exudate.  Dentition intact.    Neck: No adenopathy.  No thyromegaly. Carotids +2/2 bilaterally without bruits.  No jugular venous distension.   Heart: RRR. Normal S1, S2 splits physiologically. No murmur, rub, click, or gallop. The PMI is in the 5th ICS in the midclavicular line. There is no heave.    Lungs: CTA.  No ronchi, wheezes, rales.  No dullness to percussion.   Abdomen: Soft, nontender, nondistended. No organomegaly. No AAA.  No bruits.   Extremities: No clubbing, cyanosis, or edema.  No wounds. + varicose veins signs of chronic venous insufficiency.   Vascular: No bruits are noted.    Labs:  LIPID RESULTS:  Lab Results   Component Value Date    CHOL 167 09/22/2020    HDL 42 (L) 09/22/2020     (H) 09/22/2020    TRIG 96 09/22/2020    CHOLHDLRATIO 3.8 07/21/2014    NHDL 125 09/22/2020       LIVER ENZYME RESULTS:  Lab Results   Component Value Date    AST 13 09/22/2020    ALT 23 09/22/2020       CBC RESULTS:  Lab Results   Component Value Date    WBC 7.7 06/16/2019    RBC 4.82 06/16/2019    HGB 12.0 06/16/2019    HCT 38.8 06/16/2019    MCV 81 06/16/2019    [facility] 24.9 " (L) 06/16/2019    MCHC 30.9 (L) 06/16/2019    RDW 13.7 06/16/2019     06/16/2019       BMP RESULTS:  Lab Results   Component Value Date     09/22/2020    POTASSIUM 4.1 09/22/2020    CHLORIDE 109 09/22/2020    CO2 21 09/22/2020    ANIONGAP 6 09/22/2020    GLC 87 09/22/2020    BUN 9 09/22/2020    CR 0.70 09/22/2020    GFRESTIMATED >90 09/22/2020    GFRESTBLACK >90 09/22/2020    PRANAV 8.3 (L) 09/22/2020        A1C RESULTS:  Lab Results   Component Value Date    A1C 5.4 06/11/2003

## 2020-12-07 ENCOUNTER — TELEPHONE (OUTPATIENT)
Dept: CARDIOLOGY | Facility: CLINIC | Age: 35
End: 2020-12-07

## 2020-12-07 NOTE — TELEPHONE ENCOUNTER
Spoke to patient regarding getting the CTA chest/abdomen and pelvis that Dr. Roman requested. Pt cancelled her OV with Dr. Roman for today as she has not had the CTA completed. Pt wondering if she could get it done at [facility]. Order faxed to [facility] radiology scheduling at [phone]. Pt aware order has been faxed and will call to get it scheduled. Once patient has the CTA she will call scheduling to make follow up with Dr. Roman. Pt has team 4 direct number to call with any questions or concerns.

## 2020-12-11 ENCOUNTER — NURSE TRIAGE (OUTPATIENT)
Dept: CARDIOLOGY | Facility: CLINIC | Age: 35
End: 2020-12-11

## 2020-12-11 NOTE — TELEPHONE ENCOUNTER
Sayda called stating the previous fax to [facility] for CTA order was not received and is requesting it be faxed again to [phone]. Copy of order faxed per request.

## 2020-12-29 ENCOUNTER — VIRTUAL VISIT (OUTPATIENT)
Dept: CARDIOLOGY | Facility: CLINIC | Age: 35
End: 2020-12-29
Payer: COMMERCIAL

## 2020-12-29 VITALS
DIASTOLIC BLOOD PRESSURE: 90 MMHG | WEIGHT: 234 LBS | HEART RATE: 79 BPM | HEIGHT: 65 IN | BODY MASS INDEX: 38.99 KG/M2 | SYSTOLIC BLOOD PRESSURE: 129 MMHG

## 2020-12-29 DIAGNOSIS — I87.309 CHRONIC VENOUS HYPERTENSION, UNSPECIFIED LATERALITY: ICD-10-CM

## 2020-12-29 DIAGNOSIS — I10 BENIGN ESSENTIAL HYPERTENSION: Primary | ICD-10-CM

## 2020-12-29 DIAGNOSIS — I77.74 VERTEBRAL ARTERY DISSECTION (H): ICD-10-CM

## 2020-12-29 PROCEDURE — 99214 OFFICE O/P EST MOD 30 MIN: CPT | Mod: 95 | Performed by: INTERNAL MEDICINE

## 2020-12-29 ASSESSMENT — MIFFLIN-ST. JEOR: SCORE: 1752.3

## 2020-12-29 NOTE — PROGRESS NOTES
"       Vascular Cardiology Consultation Follow Up      Sayda Valles is a 35 year old female who is being evaluated via a billable video visit.      The patient has been notified of following:     \"This video visit will be conducted via a call between you and your physician/provider. We have found that certain health care needs can be provided without the need for an in-person physical exam.  This service lets us provide the care you need with a video conversation.  If a prescription is necessary we can send it directly to your pharmacy.  If lab work is needed we can place an order for that and you can then stop by our lab to have the test done at a later time.    Video visits are billed at different rates depending on your insurance coverage.  Please reach out to your insurance provider with any questions.    If during the course of the call the physician/provider feels a video visit is not appropriate, you will not be charged for this service.\"    Patient has given verbal consent for Video visit? Yes  How would you like to obtain your AVS? MyChart  If you are dropped from the video visit, the video invite should be resent to: [email or web address]  Will anyone else be joining your video visit? No      Review Of Systems  Skin: NEGATIVE  Eyes:Ears/Nose/Throat: wears glasses  Respiratory: NEGATIVE  Cardiovascular:occ. Palpitations and mild chest pains, end of day slight ankle swelling  Gastrointestinal: heartburn treatable with Tums  Genitourinary:NEGATIVE  Musculoskeletal: NEGATIVE  Neurologic: hands fall asleep frequently when sleeping  Psychiatric: anxiety over medical issues  Hematologic/Lymphatic/Immunologic: seasonal allergies  Endocrine:  Biopsy on thyroid nodule in 2020 that was negative    Vitals - Patient Reported  Systolic (Patient Reported): 129  Diastolic (Patient Reported): (!) 90  Weight (Patient Reported): 106.1 kg (234 lb)  Pulse (Patient Reported): 79    ИВАН Benedict    Telephone number of " patient: [phone]    PROVIDER NOTES:    This is a 35 year old pleasant female with PMH HTN, 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 She also has borderline dilated aorta at 3.7 cm. Here for follow up.      From prior visit: Leading up to her stroke she was seeing chiropractor multiple times 1-2 times per week. She also has palpitations, frequently. No syncope. No longer drinking caffeine. Had ziopatch in October demonstrating PACs/PVCs associated with symptoms <1% burden. She has been on metoprolol XL and was taking it during the ziopatch testing.     Has long standing hypertension that got worse after her stroke. She had never had work up for FMD in the abdomen/pelvis so we obtained a CT which was negative for any beading appearance. Echocardiogram showed small PFO, no LVH or HCM, and ascending aorta at 3.7 cm which is mildly dilated for her height. EKG has been overall unremarkable. She is on aspirin daily as well.      Family history reviewed. Her father had heart attack in his 40s. Grandfather had stroke in his 70s. Paternal uncle with heart attack in his 40s. Has a 15 year old daughter who is healthy. 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 Does not smoke or drink heavily. She has been isolating with COVID pandemic. 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      On ROS: no lens dislocation, normal eye-width, normal uvula, normal palate, no hypermobility in thumb joints, no skin translucency, normal skin, no vaginal prolapse or hernias, + abnormal wound healing, +easy bruising, no chest wall deformities, no dental crowding, normal stature. No scoliosis.      CT Imaging of her brain was reviewed:  "                                                      IMPRESSION:  1. Improved patency of the V2, V3 and proximal V4 segments of the  right vertebral artery in the setting of previous dissection.  Persistent mild eccentric wall thickening representing small residual  intramural hematoma without flow limiting stenosis.  2. Stable appearance of the dissection with associated small  pseudoaneurysm involving the V1 segment of the left vertebral artery.  3. Unchanged medially directed small saccular aneurysm involving the  distal cavernous segment of the left internal carotid artery.    She is planning to undergo intervention for her saccular aneurysm as soon as COVID surge improves.      ASSESSMENT/PLAN:     This is a pleasant 35 year old female with vertebral artery dissections and stroke who is here for ongoing evaluation of vascular connective tissue disorder. She was found to have also a mildly dilated aorta at 3.7 cm for her height. Her family history is significant for stroke and early heart attacks, no sudden cardiac death that is known. On ROS she has poor wound healing, no joint hypermobility. Palpitations are also very common in patients with genetic vasculopathies. Echocardiogram reviewed and no valvular pathology (specifically, mitral valve prolapse or annular dysjunction).     Fibromuscular dysplasia we discussed can affect the renal, carotid, vertebral, iliac, and coronary arteries. Commonly it causes string-of-bead appearance on imaging but that is only one variant that exists, and other less common variants include those that (in the absence of string of bead appearance) cause vascular loops, fusiform vascular ectasia, spontaneous arterial dissection, aneurysmal dilations. Given these findings are present on her imaging, even without \"classic string of beads\", and she is female, young and with hypertension I cannot exclude an FMD variant in her. Often times IVUS can be useful in detection if diagnosis " changes management, but at this time it will likely not . She is planning to undergo coiling of her saccular aneurysm at a future date.    Alternative diagnoses include syndromic vascular fragility syndromes that can lead to dissection from head manipulation, including LDS, EDS, and these are associated with aortic dilation as well. These could be considered given early stroke and heart attack in the family. Often genetic vasculopathies have been associated with early coronary artery disease and atherosclerosis.  At this time no need for vascular genetic testing.     Recommendations:  1. Q1-2 year ultrasounds of carotids, renal arteries for surveillance, if any abnormal flow then would follow up with CT imaging  2. Recommend daily aspirin indefinitely and beta blocker therapy which she is on (also to help with palpitations which are benign PVCs)  3. Echocardiogram every year for aorta sizing   4. Will follow up with  Neuro interventional   5. Large proximal thigh varicose vein up to groin - would like to evaluate for iliac vein compression syndrome and signs of proximal obstruction with venous competency study  6. Follow up with me in 6 months     Total time 19 minutes.     Alexandra Roman MD [facility]       Orders this Visit:  Orders Placed This Encounter   Procedures     US Venous Competency Bilateral     No orders of the defined types were placed in this encounter.    Medications Discontinued During This Encounter   Medication Reason     Camphor-Menthol 0.2-3.5 % LIQD Stopped by Patient         Encounter Diagnoses   Name Primary?     Benign essential hypertension Yes     Chronic venous hypertension, unspecified laterality        CURRENT MEDICATIONS:  Current Outpatient Medications   Medication Sig Dispense Refill     acetaminophen (TYLENOL) 500 MG tablet Take 1,000 mg by mouth 2 times daily as needed        albuterol (PROAIR HFA/PROVENTIL HFA/VENTOLIN HFA) 108 (90 Base) MCG/ACT  "inhaler Inhale 1-2 puffs into the lungs every 4 hours as needed for shortness of breath / dyspnea or wheezing 1 Inhaler 1     aspirin (ASA) 81 MG EC tablet Take 1 tablet (81 mg) by mouth daily       cyclobenzaprine (FLEXERIL) 10 MG tablet Take 1 tablet (10 mg) by mouth 2 times daily as needed for muscle spasms 10 tablet 0     ibuprofen (ADVIL/MOTRIN) 200 MG tablet Take 200 mg by mouth every 4 hours as needed for mild pain       loratadine (CLARITIN) 10 MG tablet Take 10 mg by mouth daily       metoprolol succinate ER (TOPROL-XL) 50 MG 24 hr tablet Take 1 tablet (50 mg) by mouth daily 90 tablet 3     Multiple Vitamins-Minerals (MULTIVITAMIN GUMMIES WOMENS) CHEW Smarty Pants Women's Complete       VITAMIN D PO Take 2,000 Int'l Units by mouth         ALLERGIES     Allergies   Allergen Reactions     Zithromax [Azithromycin Dihydrate] GI Disturbance       PAST MEDICAL, SURGICAL, FAMILY, SOCIAL HISTORY:  History was reviewed and updated as needed, see medical record.    Review of Systems:  A 12-point review of systems was completed, see medical record for detailed review of systems information.    Physical Exam:  Vitals: BP (!) 129/90   Pulse 79   Ht 1.651 m (5' 5\")   Wt 106.1 kg (234 lb)   BMI 38.94 kg/m    GENERAL: healthy, alert and no distress  EYES: Eyes grossly normal to inspection, conjunctivae and sclerae normal  RESP: no audible wheeze, cough, or visible cyanosis.  No visible retractions or increased work of breathing.  Able to speak fully in complete sentences  NEURO: Cranial nerves grossly intact, mentation intact and speech normal  PSYCH: mentation appears normal, affect normal/bright, judgement and insight intact, normal speech and appearance well-groomed  CARDIOVASCULAR: Neck veins not appreciated indicating probable normal JVP. Normal skin appearance, no stasis dermatitis.   Unable to visualize varicose vein by video       Recent Lab Results:  LIPID RESULTS:  Lab Results   Component Value Date    CHOL " 167 09/22/2020    HDL 42 (L) 09/22/2020     (H) 09/22/2020    TRIG 96 09/22/2020    CHOLHDLRATIO 3.8 07/21/2014       LIVER ENZYME RESULTS:  Lab Results   Component Value Date    AST 13 09/22/2020    ALT 23 09/22/2020       CBC RESULTS:  Lab Results   Component Value Date    WBC 7.7 06/16/2019    RBC 4.82 06/16/2019    HGB 12.0 06/16/2019    HCT 38.8 06/16/2019    MCV 81 06/16/2019    MCH 24.9 (L) 06/16/2019    MCHC 30.9 (L) 06/16/2019    RDW 13.7 06/16/2019     06/16/2019       BMP RESULTS:  Lab Results   Component Value Date     09/22/2020    POTASSIUM 4.1 09/22/2020    CHLORIDE 109 09/22/2020    CO2 21 09/22/2020    ANIONGAP 6 09/22/2020    GLC 87 09/22/2020    BUN 9 09/22/2020    CR 0.70 09/22/2020    GFRESTIMATED >90 09/22/2020    GFRESTBLACK >90 09/22/2020    PRANAV 8.3 (L) 09/22/2020        A1C RESULTS:  Lab Results   Component Value Date    A1C 5.4 06/11/2003       INR RESULTS:  No results found for: INR        CC  Alexandra Roman MD  [address] [place] [id number]      Video-Visit Details    Type of service:  Video Visit  JADYN    Video Time: 19 minutes    Originating Location (pt. Location): Home    Distant Location (provider location):  [facility]     Platform used for Video Visit: JADYN

## 2020-12-29 NOTE — LETTER
"12/29/2020    Brittany Miles, APRN CNP  [address] [facility] [address]    RE: Sayda Valles       Dear Colleague,    I had the pleasure of seeing Sayda Valles in the [facility].        Sayda Valles is a 35 year old female who is being evaluated via a billable video visit.      The patient has been notified of following:     \"This video visit will be conducted via a call between you and your physician/provider. We have found that certain health care needs can be provided without the need for an in-person physical exam.  This service lets us provide the care you need with a video conversation.  If a prescription is necessary we can send it directly to your pharmacy.  If lab work is needed we can place an order for that and you can then stop by our lab to have the test done at a later time.    Video visits are billed at different rates depending on your insurance coverage.  Please reach out to your insurance provider with any questions.    If during the course of the call the physician/provider feels a video visit is not appropriate, you will not be charged for this service.\"    Patient has given verbal consent for Video visit? Yes  How would you like to obtain your AVS? MyChart  If you are dropped from the video visit, the video invite should be resent to: [email or web address]  Will anyone else be joining your video visit? No      Review Of Systems  Skin: NEGATIVE  Eyes:Ears/Nose/Throat: wears glasses  Respiratory: NEGATIVE  Cardiovascular:occ. Palpitations and mild chest pains, end of day slight ankle swelling  Gastrointestinal: heartburn treatable with Tums  Genitourinary:NEGATIVE  Musculoskeletal: NEGATIVE  Neurologic: hands fall asleep frequently when sleeping  Psychiatric: anxiety over medical issues  Hematologic/Lymphatic/Immunologic: seasonal allergies  Endocrine:  Biopsy on thyroid nodule in 2020 that was negative    Vitals - Patient Reported  Systolic (Patient " Reported): 129  Diastolic (Patient Reported): (!) 90  Weight (Patient Reported): 106.1 kg (234 lb)  Pulse (Patient Reported): 79    ИВАН Benedict    Telephone number of patient: [phone]    PROVIDER NOTES:    This is a 35 year old pleasant female with PMH HTN, 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 She also has borderline dilated aorta at 3.7 cm. Here for follow up.      From prior visit: Leading up to her stroke she was seeing chiropractor multiple times 1-2 times per week. She also has palpitations, frequently. No syncope. No longer drinking caffeine. Had ziopatch in October demonstrating PACs/PVCs associated with symptoms <1% burden. She has been on metoprolol XL and was taking it during the ziopatch testing.     Has long standing hypertension that got worse after her stroke. She had never had work up for FMD in the abdomen/pelvis so we obtained a CT which was negative for any beading appearance. Echocardiogram showed small PFO, no LVH or HCM, and ascending aorta at 3.7 cm which is mildly dilated for her height. EKG has been overall unremarkable. She is on aspirin daily as well.      Family history reviewed. Her father had heart attack in his 40s. Grandfather had stroke in his 70s. Paternal uncle with heart attack in his 40s. Has a 15 year old daughter who is healthy. 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 Does not smoke or drink heavily. She has been isolating with COVID pandemic. 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      On ROS: no lens dislocation, normal eye-width, normal uvula, normal palate, no hypermobility in thumb joints, no skin translucency, normal skin, no vaginal prolapse or hernias, + abnormal wound  "healing, +easy bruising, no chest wall deformities, no dental crowding, normal stature. No scoliosis.      CT Imaging of her brain was reviewed:                                                       IMPRESSION:  1. Improved patency of the V2, V3 and proximal V4 segments of the  right vertebral artery in the setting of previous dissection.  Persistent mild eccentric wall thickening representing small residual  intramural hematoma without flow limiting stenosis.  2. Stable appearance of the dissection with associated small  pseudoaneurysm involving the V1 segment of the left vertebral artery.  3. Unchanged medially directed small saccular aneurysm involving the  distal cavernous segment of the left internal carotid artery.    She is planning to undergo intervention for her saccular aneurysm as soon as COVID surge improves.      ASSESSMENT/PLAN:     This is a pleasant 35 year old female with vertebral artery dissections and stroke who is here for ongoing evaluation of vascular connective tissue disorder. She was found to have also a mildly dilated aorta at 3.7 cm for her height. Her family history is significant for stroke and early heart attacks, no sudden cardiac death that is known. On ROS she has poor wound healing, no joint hypermobility. Palpitations are also very common in patients with genetic vasculopathies. Echocardiogram reviewed and no valvular pathology (specifically, mitral valve prolapse or annular dysjunction).     Fibromuscular dysplasia we discussed can affect the renal, carotid, vertebral, iliac, and coronary arteries. Commonly it causes string-of-bead appearance on imaging but that is only one variant that exists, and other less common variants include those that (in the absence of string of bead appearance) cause vascular loops, fusiform vascular ectasia, spontaneous arterial dissection, aneurysmal dilations. Given these findings are present on her imaging, even without \"classic string of beads\", " and she is female, young and with hypertension I cannot exclude an FMD variant in her. Often times IVUS can be useful in detection if diagnosis changes management, but at this time it will likely not . She is planning to undergo coiling of her saccular aneurysm at a future date.    Alternative diagnoses include syndromic vascular fragility syndromes that can lead to dissection from head manipulation, including LDS, EDS, and these are associated with aortic dilation as well. These could be considered given early stroke and heart attack in the family. Often genetic vasculopathies have been associated with early coronary artery disease and atherosclerosis.  At this time no need for vascular genetic testing.     Recommendations:  1. Q1-2 year ultrasounds of carotids, renal arteries for surveillance, if any abnormal flow then would follow up with CT imaging  2. Recommend daily aspirin indefinitely and beta blocker therapy which she is on (also to help with palpitations which are benign PVCs)  3. Echocardiogram every year for aorta sizing   4. Will follow up with  Neuro interventional   5. Large proximal thigh varicose vein up to groin - would like to evaluate for iliac vein compression syndrome and signs of proximal obstruction with venous competency study  6. Follow up with me in 6 months     Total time 19 minutes.     Alexandra Roman MD MSc  [facility]       Orders this Visit:  Orders Placed This Encounter   Procedures     US Venous Competency Bilateral     No orders of the defined types were placed in this encounter.    Medications Discontinued During This Encounter   Medication Reason     Camphor-Menthol 0.2-3.5 % LIQD Stopped by Patient         Encounter Diagnoses   Name Primary?     Benign essential hypertension Yes     Chronic venous hypertension, unspecified laterality        CURRENT MEDICATIONS:  Current Outpatient Medications   Medication Sig Dispense Refill     acetaminophen  "(TYLENOL) 500 MG tablet Take 1,000 mg by mouth 2 times daily as needed        albuterol (PROAIR HFA/PROVENTIL HFA/VENTOLIN HFA) 108 (90 Base) MCG/ACT inhaler Inhale 1-2 puffs into the lungs every 4 hours as needed for shortness of breath / dyspnea or wheezing 1 Inhaler 1     aspirin (ASA) 81 MG EC tablet Take 1 tablet (81 mg) by mouth daily       cyclobenzaprine (FLEXERIL) 10 MG tablet Take 1 tablet (10 mg) by mouth 2 times daily as needed for muscle spasms 10 tablet 0     ibuprofen (ADVIL/MOTRIN) 200 MG tablet Take 200 mg by mouth every 4 hours as needed for mild pain       loratadine (CLARITIN) 10 MG tablet Take 10 mg by mouth daily       metoprolol succinate ER (TOPROL-XL) 50 MG 24 hr tablet Take 1 tablet (50 mg) by mouth daily 90 tablet 3     Multiple Vitamins-Minerals (MULTIVITAMIN GUMMIES WOMENS) CHEW Smarty Pants Women's Complete       VITAMIN D PO Take 2,000 Int'l Units by mouth         ALLERGIES     Allergies   Allergen Reactions     Zithromax [Azithromycin Dihydrate] GI Disturbance       PAST MEDICAL, SURGICAL, FAMILY, SOCIAL HISTORY:  History was reviewed and updated as needed, see medical record.    Review of Systems:  A 12-point review of systems was completed, see medical record for detailed review of systems information.    Physical Exam:  Vitals: BP (!) 129/90   Pulse 79   Ht 1.651 m (5' 5\")   Wt 106.1 kg (234 lb)   BMI 38.94 kg/m    GENERAL: healthy, alert and no distress  EYES: Eyes grossly normal to inspection, conjunctivae and sclerae normal  RESP: no audible wheeze, cough, or visible cyanosis.  No visible retractions or increased work of breathing.  Able to speak fully in complete sentences  NEURO: Cranial nerves grossly intact, mentation intact and speech normal  PSYCH: mentation appears normal, affect normal/bright, judgement and insight intact, normal speech and appearance well-groomed  CARDIOVASCULAR: Neck veins not appreciated indicating probable normal JVP. Normal skin appearance, no " stasis dermatitis.   Unable to visualize varicose vein by video       Recent Lab Results:  LIPID RESULTS:  Lab Results   Component Value Date    CHOL 167 09/22/2020    HDL 42 (L) 09/22/2020     (H) 09/22/2020    TRIG 96 09/22/2020    CHOLHDLRATIO 3.8 07/21/2014       LIVER ENZYME RESULTS:  Lab Results   Component Value Date    AST 13 09/22/2020    ALT 23 09/22/2020       CBC RESULTS:  Lab Results   Component Value Date    WBC 7.7 06/16/2019    RBC 4.82 06/16/2019    HGB 12.0 06/16/2019    HCT 38.8 06/16/2019    MCV 81 06/16/2019    MCH 24.9 (L) 06/16/2019    MCHC 30.9 (L) 06/16/2019    RDW 13.7 06/16/2019     06/16/2019       BMP RESULTS:  Lab Results   Component Value Date     09/22/2020    POTASSIUM 4.1 09/22/2020    CHLORIDE 109 09/22/2020    CO2 21 09/22/2020    ANIONGAP 6 09/22/2020    GLC 87 09/22/2020    BUN 9 09/22/2020    CR 0.70 09/22/2020    GFRESTIMATED >90 09/22/2020    GFRESTBLACK >90 09/22/2020    PRANAV 8.3 (L) 09/22/2020        A1C RESULTS:  Lab Results   Component Value Date    A1C 5.4 06/11/2003       INR RESULTS:  No results found for: INR      Video-Visit Details    Type of service:  Video Visit  JADYN    Video Time: 19 minutes    Originating Location (pt. Location): Home    Distant Location (provider location):  [facility]     Platform used for Video Visit: JADYN      Thank you for allowing me to participate in the care of your patient.    Sincerely,     Alexandra Rmoan MD     [facility]

## 2020-12-29 NOTE — PATIENT INSTRUCTIONS
1. Continue current medications. If palpitations increase we can change metoprolol XL to short acting 25-50 mg bid   2. Vein competency study in 3-4 months  3. Follow up with Dr. Roman in 5-6 months

## 2021-02-10 ENCOUNTER — TELEPHONE (OUTPATIENT)
Dept: FAMILY MEDICINE | Facility: CLINIC | Age: 36
End: 2021-02-10

## 2021-02-10 NOTE — TELEPHONE ENCOUNTER
"Pt called to discuss new onset of covid positive test.     Writer advised of home cares, treatment of symptoms and when to be seen.     Pt advised of instructions below. Patient stated an understanding and agreed with plan.  Pt advised of recommendations. Pt advised to call the clinic with any further symptoms or changes. Also advised to go to UC or ER if symptoms increase. The patient indicates understanding of these issues and agrees with the plan.        Discharge Instructions for COVID-19 Patients  You have COVID-19. Please follow the instructions listed below.   If you have a weakened immune system, discuss with your doctor any other actions you need to take.  How can I protect others?  If you have symptoms (fever, cough, body aches or trouble breathing):    Stay home and away from others (self-isolate) until:  ? Your other symptoms have resolved (gotten better). And   ? You've had no fever--and no medicine that reduces fever--for 1 full day (24 hours). And   ? At least 10 days have passed since your symptoms started. (You may need to wait 20 days. Follow the advice of your care team.)  If you don't show symptoms, but testing showed that you have COVID-19:    Stay home and away from others (self-isolate) until at least 10 days have passed since the date of your first positive COVID-19 test.  During this time    Stay in your own room, even for meals. Use your own bathroom if you can.    Stay away from others in your home. No hugging, kissing or shaking hands. No visitors.    Don't go to work, school or anywhere else.    Clean \"high touch\" surfaces often (doorknobs, counters, handles). Use household cleaning spray or wipes.    You'll find a full list of  on the EPA website: www.epa.gov/pesticide-registration/list-n-disinfectants-use-against-sars-cov-2.    Cover your mouth and nose with a mask or other face covering to avoid spreading germs.    Wash your hands and face often. Use soap and " water.    Caregivers in these groups are at risk for severe illness due to COVID-19:  ? People 65 years and older  ? People who live in a nursing home or long-term care facility  ? People with chronic disease (lung, heart, cancer, diabetes, kidney, liver, immunologic)  ? People who have a weakened immune system, including those who:    Are in cancer treatment    Take medicine that weakens the immune system, such as corticosteroids    Had a bone marrow or organ transplant    Have an immune deficiency    Have poorly controlled HIV or AIDS    Are obese (body mass index of 40 or higher)    Smoke regularly    Caregivers should wear gloves while washing dishes, handling laundry and cleaning bedrooms and bathrooms.    Use caution when washing and drying laundry: Don't shake dirty laundry and use the warmest water setting that you can.    For more tips on managing your health at home, go to www.cdc.gov/coronavirus/2019-ncov/downloads/10Things.pdf.  How can I take care of myself at home?  1. Get lots of rest. Drink extra fluids (unless a doctor has told you not to).  2. Take Tylenol (acetaminophen) for fever or pain. If you have liver or kidney problems, ask your family doctor if it's okay to take Tylenol.   Adults can take either:   ? 650 mg (two 325 mg pills) every 4 to 6 hours, or   ? 1,000 mg (two 500 mg pills) every 8 hours as needed.  ? Note: Don't take more than 3,000 mg in one day. Acetaminophen is found in many medicines (both prescribed and over-the-counter medicines). Read all labels to be sure you don't take too much.   For children, check the Tylenol bottle for the right dose. The dose is based on the child's age or weight.  3. If you have other health problems (like cancer, heart failure, an organ transplant or severe kidney disease): Call your specialty clinic if you don't feel better in the next 2 days.  4. Know when to call 911. Emergency warning signs include:  ? Trouble breathing or shortness of  breath  ? Pain or pressure in the chest that doesn't go away  ? Feeling confused like you haven't felt before, or not being able to wake up  ? Bluish-colored lips or face  5. Your doctor may have prescribed a blood thinner medicine. Follow their instructions.  Where can I get more information?    [facility] - About COVID-19:   [email or web address]    CDC - What to Do If You're Sick: www.cdc.gov/coronavirus/2019-ncov/about/steps-when-sick.html    CDC - Ending Home Isolation: www.cdc.gov/coronavirus/2019-ncov/hcp/disposition-in-home-patients.html    CDC - Caring for Someone: www.cdc.gov/coronavirus/2019-ncov/if-you-are-sick/care-for-someone.html    [facility] - Interim Guidance for Hospital Discharge to Home: www.health.[facility].mn.us/diseases/coronavirus/hcp/hospdischarge.pdf    Below are the COVID-19 hotlines at the [facility] ([facility]). Interpreters are available.  ? For health questions: Call 647-303-1566 or 1-609.916.2333 (7 a.m. to 7 p.m.)  ? For questions about schools and childcare: Call 507-397-2220 or 1-763.392.5254 (7 a.m. to 7 p.m.)    For informational purposes only. Not to replace the advice of your health care provider. Clinically reviewed by Dr. Hayder Almonte.   Copyright   2020 [place]. All rights reserved. [facility] [id number] - REV 01/05/21.    HELENA Terrazas [facility] [place] Triage

## 2021-05-25 ENCOUNTER — OFFICE VISIT (OUTPATIENT)
Dept: FAMILY MEDICINE | Facility: CLINIC | Age: 36
End: 2021-05-25
Payer: COMMERCIAL

## 2021-05-25 VITALS
SYSTOLIC BLOOD PRESSURE: 128 MMHG | TEMPERATURE: 98.3 F | WEIGHT: 239 LBS | BODY MASS INDEX: 39.77 KG/M2 | HEART RATE: 101 BPM | DIASTOLIC BLOOD PRESSURE: 82 MMHG | OXYGEN SATURATION: 100 % | RESPIRATION RATE: 20 BRPM

## 2021-05-25 DIAGNOSIS — R30.0 DYSURIA: ICD-10-CM

## 2021-05-25 DIAGNOSIS — R10.11 RUQ ABDOMINAL PAIN: Primary | ICD-10-CM

## 2021-05-25 LAB
ALBUMIN UR-MCNC: NEGATIVE MG/DL
APPEARANCE UR: CLEAR
BILIRUB UR QL STRIP: NEGATIVE
COLOR UR AUTO: YELLOW
GLUCOSE UR STRIP-MCNC: NEGATIVE MG/DL
HGB UR QL STRIP: NEGATIVE
KETONES UR STRIP-MCNC: NEGATIVE MG/DL
LEUKOCYTE ESTERASE UR QL STRIP: NEGATIVE
NITRATE UR QL: NEGATIVE
PH UR STRIP: 7 PH (ref 5–7)
SOURCE: NORMAL
SP GR UR STRIP: 1.01 (ref 1–1.03)
UROBILINOGEN UR STRIP-ACNC: 0.2 EU/DL (ref 0.2–1)

## 2021-05-25 PROCEDURE — 81003 URINALYSIS AUTO W/O SCOPE: CPT | Performed by: NURSE PRACTITIONER

## 2021-05-25 PROCEDURE — 99213 OFFICE O/P EST LOW 20 MIN: CPT | Performed by: NURSE PRACTITIONER

## 2021-05-25 NOTE — PROGRESS NOTES
"    Assessment & Plan     Sayda was seen today for abdominal pain.    Diagnoses and all orders for this visit:    RUQ abdominal pain  New onset dull intermittent RUQ abdominal pain.  Will plan to proceed with ultrasound to rule out cholelithiasis.    Trial of Acetaminophen and ice/heat application as needed for discomfort.   Follow-up with no improvement or worsening of symptoms.    -     US Abdomen Limited; Future    Dysuria  -     UA reflex to Microscopic and Culture  Results for orders placed or performed in visit on 05/25/21   UA reflex to Microscopic and Culture     Status: None    Specimen: Midstream Urine   Result Value Ref Range    Color Urine Yellow     Appearance Urine Clear     Glucose Urine Negative NEG^Negative mg/dL    Bilirubin Urine Negative NEG^Negative    Ketones Urine Negative NEG^Negative mg/dL    Specific Gravity Urine 1.015 1.003 - 1.035    Blood Urine Negative NEG^Negative    pH Urine 7.0 5.0 - 7.0 pH    Protein Albumin Urine Negative NEG^Negative mg/dL    Urobilinogen Urine 0.2 0.2 - 1.0 EU/dL    Nitrite Urine Negative NEG^Negative    Leukocyte Esterase Urine Negative NEG^Negative    Source Midstream Urine                   BMI:   Estimated body mass index is 39.77 kg/m  as calculated from the following:    Height as of 12/29/20: 1.651 m (5' 5\").    Weight as of this encounter: 108.4 kg (239 lb).       Return in about 4 weeks (around 6/22/2021) for No improvement or sooner with worsening symptoms.    Brittany Miles, CHE CNP  [facility]     Sayda is a 35 year old who presents for the following health issues Abdomen pain in RUQ    HPI     Abdominal/Flank Pain    Right upper quadrant pain, dull, not severe, feels like a pulled muscle. Onset a couple weeks ago.    Aggravated with movement or palpation of area.   No sharp or gas pain.   No nausea or vomiting.  No change with eating.    No shoulder pain.   Slight increase in heartburn, more noticeable at " night. Taking Tums as needed.        Onset/Duration: a few weeks  Description:   Character: Dull ache  Location: right upper quadrant  Radiation: None  Intensity: mild  Progression of Symptoms:  same and intermittent  Accompanying Signs & Symptoms:  Fever/Chills: no  Gas/Bloating: no  Nausea: no  Vomitting: no  Diarrhea: no  Constipation: no  Dysuria or Hematuria: no  History:   Trauma: no  Previous similar pain: no  Previous tests done: none  Precipitating factors:   Does the pain change with:     Food: no    Bowel Movement: no    Urination: no   Other factors:  no  Therapies tried and outcome: None  Patient's last menstrual period was 05/11/2021.    Intermittent dysuria.  Would like urine testing done today.        ACT Total Scores 10/1/2019 6/25/2020 5/25/2021   ACT TOTAL SCORE - - -   ASTHMA ER VISITS - - -   ASTHMA HOSPITALIZATIONS - - -   ACT TOTAL SCORE (Goal Greater than or Equal to 20) 23 22 24   In the past 12 months, how many times did you visit the emergency room for your asthma without being admitted to the hospital? 0 0 0   In the past 12 months, how many times were you hospitalized overnight because of your asthma? 0 0 0       Review of Systems   Constitutional, HEENT, cardiovascular, pulmonary, gi and gu systems are negative, except as otherwise noted.      Objective    /82   Pulse 101   Temp 98.3  F (36.8  C)   Resp 20   Wt 108.4 kg (239 lb)   LMP 05/11/2021   SpO2 100%   Breastfeeding No   BMI 39.77 kg/m    Body mass index is 39.77 kg/m .  Physical Exam     GENERAL: healthy, alert and no distress  RESP: lungs clear to auscultation - no rales, rhonchi or wheezes  CV: regular rate and rhythm, normal S1 S2, no S3 or S4, no murmur, click or rub, no peripheral edema  ABDOMEN: soft, nontender, no hepatosplenomegaly, no masses and bowel sounds normal  PSYCH: mentation appears normal, affect normal/bright

## 2021-05-25 NOTE — RESULT ENCOUNTER NOTE
Dear Sayda,     Your urine test was overall normal and reassuring, there were no signs of infection.       Please send a [employer] message or call [phone]  if you have any questions.      CHE Rueda, [facility]    If you have further questions about the interpretation of your labs, [email or web address] is a good website to check out for further information.

## 2021-05-26 ASSESSMENT — ASTHMA QUESTIONNAIRES: ACT_TOTALSCORE: 24

## 2021-06-02 ENCOUNTER — HOSPITAL ENCOUNTER (OUTPATIENT)
Dept: ULTRASOUND IMAGING | Facility: CLINIC | Age: 36
Discharge: HOME | End: 2021-06-02
Attending: NURSE PRACTITIONER | Admitting: NURSE PRACTITIONER
Payer: COMMERCIAL

## 2021-06-02 ENCOUNTER — MYC MEDICAL ADVICE (OUTPATIENT)
Dept: FAMILY MEDICINE | Facility: CLINIC | Age: 36
End: 2021-06-02

## 2021-06-02 DIAGNOSIS — R10.11 RUQ ABDOMINAL PAIN: Primary | ICD-10-CM

## 2021-06-02 DIAGNOSIS — R10.11 RUQ ABDOMINAL PAIN: ICD-10-CM

## 2021-06-02 PROCEDURE — 76705 ECHO EXAM OF ABDOMEN: CPT

## 2021-06-02 NOTE — RESULT ENCOUNTER NOTE
Dear Sayda,     I am happy to see that your ultrasound was normal and reassuring!  I hope you are starting to feel better!      FINDINGS:     GALLBLADDER: The gallbladder is normal. No gallstones, wall  thickening, or pericholecystic fluid. Negative sonographic Rodriguez's  sign.     BILE DUCTS: There is no biliary dilatation. The common duct measures  2mm.     LIVER: Normal.     RIGHT KIDNEY: Normal. No hydronephrosis.     PANCREAS: The visualized portions of the pancreas are normal.     No ascites.       IMPRESSION:  1.  Normal limited abdominal ultrasound.        Please send a [employer] message or call [phone]  if you have any questions.      Brittany Miles, APRN, CNP  [facility]

## 2021-06-14 ENCOUNTER — NURSE TRIAGE (OUTPATIENT)
Dept: NURSING | Facility: CLINIC | Age: 36
End: 2021-06-14

## 2021-06-15 ENCOUNTER — TRANSFERRED RECORDS (OUTPATIENT)
Dept: HEALTH INFORMATION MANAGEMENT | Facility: CLINIC | Age: 36
End: 2021-06-15

## 2021-06-15 NOTE — TELEPHONE ENCOUNTER
Pt called stating she takes 50 mg metoprolol for her blood pressure once a day and 81 mg Asprin. Pt reported she has been having raising heart, and realized she didn't take her morning  above blood pressure medications. Pt reported she is having pressure headache, has palpitations earlier. Pt wants to know if she can take the medication , na what about tomorrow dose?    Pt reported she checked her heart rate while sitting and it was 113, 110 and lastly 106. Pt reported she checked her blood pressure,and it was 110/104. Pt reported she never forgot to take her medication. Pt reported she has apple watch that has ECG, and it is says sinus rhythm.      RN paged on call provider,recived aretun call from Lani Ritter and provider was updated, and he said he is concerned for A feb, and advised pt to go to the emergency room to be seen,and not take the medications.       RN called pt back relayed provider advice, and she stated okay. Pt said she had stroke in the past.      Grayson Ladd RN  [facility]     COVID 19 Nurse Triage Plan/Patient Instructions    Please be aware that novel coronavirus (COVID-19) may be circulating in the community. If you develop symptoms such as fever, cough, or SOB or if you have concerns about the presence of another infection including coronavirus (COVID-19), please contact your health care provider or visit [email or web address].     Disposition/Instructions    ED Visit recommended. Follow protocol based instructions.     Bring Your Own Device:  Please also bring your smart device(s) (smart phones, tablets, laptops) and their charging cables for your personal use and to communicate with your care team during your visit.    Thank you for taking steps to prevent the spread of this virus.  o Limit your contact with others.  o Wear a simple mask to cover your cough.  o Wash your hands well and often.    Resources    [facility]: About COVID-19:  [email or web address]    CDC: What to Do If You're Sick: www.cdc.gov/coronavirus/2019-ncov/about/steps-when-sick.html    CDC: Ending Home Isolation: www.cdc.gov/coronavirus/2019-ncov/hcp/disposition-in-home-patients.html     CDC: Caring for Someone: www.cdc.gov/coronavirus/2019-ncov/if-you-are-sick/care-for-someone.html     [facility]: Interim Guidance for Hospital Discharge to Home: www.[facility].mn./diseases/coronavirus/hcp/hospdischarge.pdf    [facility] clinical trials (COVID-19 research studies): clinicalaffairs.[facility]-clinical-trials     Below are the COVID-19 hotlines at the [place] [facility] of [facility] ([facility]). Interpreters are available.   o For health questions: Call 829-933-6573 or 1-809.315.5369 (7 a.m. to 7 p.m.)  o For questions about schools and childcare: Call 313-675-7568 or 1-644.380.6247 (7 a.m. to 7 p.m.)                         Reason for Disposition    [1] Caller has URGENT medication question about med that PCP or specialist prescribed AND [2] triager unable to answer question    Protocols used: MEDICATION QUESTION CALL-A-

## 2021-08-05 ENCOUNTER — TRANSFERRED RECORDS (OUTPATIENT)
Dept: HEALTH INFORMATION MANAGEMENT | Facility: CLINIC | Age: 36
End: 2021-08-05

## 2021-08-20 ENCOUNTER — TRANSFERRED RECORDS (OUTPATIENT)
Dept: HEALTH INFORMATION MANAGEMENT | Facility: CLINIC | Age: 36
End: 2021-08-20

## 2021-09-13 ENCOUNTER — TELEPHONE (OUTPATIENT)
Dept: FAMILY MEDICINE | Facility: CLINIC | Age: 36
End: 2021-09-13

## 2021-09-13 NOTE — TELEPHONE ENCOUNTER
"S-(situation): patient has seen provider for this in May 2021. Ruled out via US any acute issues. Patient still experiencing upper right abdominal discomfort \"feels like a pulled muscle\" radiating around her back    B-(background): seen provider May 2021    A-(assessment): no blood in urine, no fever. Nothing more than \"a pulled muscle feeling\" but wants to continue getting evaluated since hasn't resolved    R-(recommendations): made virtual appointment since provider is aware of symptoms.  Possible more tests and recommendations. Wanted to get seen as soon as she could.  Next 5 appointments (look out 90 days)    Sep 16, 2021  2:30 PM  SHORT with CHE Garcia CNP  [facility] ([facility] ) [address] [place] [address] [phone]            "

## 2021-09-18 ENCOUNTER — HEALTH MAINTENANCE LETTER (OUTPATIENT)
Age: 36
End: 2021-09-18

## 2021-09-20 ENCOUNTER — LAB (OUTPATIENT)
Dept: LAB | Facility: CLINIC | Age: 36
End: 2021-09-20
Payer: COMMERCIAL

## 2021-09-20 DIAGNOSIS — R10.11 RUQ ABDOMINAL PAIN: ICD-10-CM

## 2021-09-20 LAB
ERYTHROCYTE [DISTWIDTH] IN BLOOD BY AUTOMATED COUNT: 13.8 % (ref 10–15)
HCT VFR BLD AUTO: 35.1 % (ref 35–47)
HGB BLD-MCNC: 11.1 G/DL (ref 11.7–15.7)
MCH RBC QN AUTO: 23.4 PG (ref 26.5–33)
MCHC RBC AUTO-ENTMCNC: 31.6 G/DL (ref 31.5–36.5)
MCV RBC AUTO: 74 FL (ref 78–100)
PLATELET # BLD AUTO: 325 10E3/UL (ref 150–450)
RBC # BLD AUTO: 4.74 10E6/UL (ref 3.8–5.2)
WBC # BLD AUTO: 7.6 10E3/UL (ref 4–11)

## 2021-09-20 PROCEDURE — 83550 IRON BINDING TEST: CPT

## 2021-09-20 PROCEDURE — 36415 COLL VENOUS BLD VENIPUNCTURE: CPT

## 2021-09-20 PROCEDURE — 82728 ASSAY OF FERRITIN: CPT

## 2021-09-20 PROCEDURE — 85027 COMPLETE CBC AUTOMATED: CPT

## 2021-09-20 PROCEDURE — 80053 COMPREHEN METABOLIC PANEL: CPT

## 2021-09-21 ENCOUNTER — MYC MEDICAL ADVICE (OUTPATIENT)
Dept: FAMILY MEDICINE | Facility: CLINIC | Age: 36
End: 2021-09-21

## 2021-09-21 DIAGNOSIS — D64.9 LOW HEMOGLOBIN: Primary | ICD-10-CM

## 2021-09-21 DIAGNOSIS — R19.8 CHANGE IN BOWEL MOVEMENT: ICD-10-CM

## 2021-09-21 LAB
ALBUMIN SERPL-MCNC: 3.5 G/DL (ref 3.4–5)
ALP SERPL-CCNC: 51 U/L (ref 40–150)
ALT SERPL W P-5'-P-CCNC: 23 U/L (ref 0–50)
ANION GAP SERPL CALCULATED.3IONS-SCNC: 2 MMOL/L (ref 3–14)
AST SERPL W P-5'-P-CCNC: 12 U/L (ref 0–45)
BILIRUB SERPL-MCNC: 0.3 MG/DL (ref 0.2–1.3)
BUN SERPL-MCNC: 10 MG/DL (ref 7–30)
CALCIUM SERPL-MCNC: 8.7 MG/DL (ref 8.5–10.1)
CHLORIDE BLD-SCNC: 109 MMOL/L (ref 94–109)
CO2 SERPL-SCNC: 27 MMOL/L (ref 20–32)
CREAT SERPL-MCNC: 0.82 MG/DL (ref 0.52–1.04)
GFR SERPL CREATININE-BSD FRML MDRD: >90 ML/MIN/1.73M2
GLUCOSE BLD-MCNC: 77 MG/DL (ref 70–99)
POTASSIUM BLD-SCNC: 4.4 MMOL/L (ref 3.4–5.3)
PROT SERPL-MCNC: 7.1 G/DL (ref 6.8–8.8)
SODIUM SERPL-SCNC: 138 MMOL/L (ref 133–144)

## 2021-09-22 LAB
FERRITIN SERPL-MCNC: 5 NG/ML (ref 12–150)
IRON SATN MFR SERPL: 6 % (ref 15–46)
IRON SERPL-MCNC: 23 UG/DL (ref 35–180)
TIBC SERPL-MCNC: 412 UG/DL (ref 240–430)

## 2021-09-22 NOTE — TELEPHONE ENCOUNTER
Pt called to inquire about lab results. Pt advised can discuss results and findings. Advised results and abnormal values, will need to discuss with Brittany what direction is needed at this time.    Pt noted US was negative:   IMPRESSION:  1.  Normal limited abdominal ultrasound.     MD Daniel MURO RN   [facility]

## 2021-09-23 NOTE — TELEPHONE ENCOUNTER
Called patient.  Discussed proceed with EGD/Colonoscopy due to history of change in bowel movements/low hemoglobin.  Patient in agreement with next steps.  - [place], CNP

## 2021-09-23 NOTE — RESULT ENCOUNTER NOTE
Deashira Alfredo,     -Hemoglobin is decreased indicating anemia.  Anemia can cause fatigue and, occasionally, light-headedness.      -Ferritin and total iron levels are low indicating iron deficiency anemia.      Are your menstrual cycles heavy?  Any blood in your stools?  We need to determine the cause and then can recommend iron supplementation.      -White blood cell and platelet counts are normal.    -Liver and gallbladder tests are normal (ALT,AST, Alk phos, bilirubin), kidney function is normal (Cr, GFR), sodium is normal, potassium is normal, calcium is normal, glucose is normal.  -Anion gap is slightly low(isn't typically a cause for concern) and the most common cause of this is a laboratory discrepancy.       Please send a [employer] message or call [phone]  if you have any questions.      CHE Rueda, CNP  [facility]    If you have further questions about the interpretation of your labs, [email or web address] is a good website to check out for further information.

## 2021-09-27 DIAGNOSIS — I10 BENIGN ESSENTIAL HYPERTENSION: ICD-10-CM

## 2021-09-29 RX ORDER — METOPROLOL SUCCINATE 50 MG/1
50 TABLET, EXTENDED RELEASE ORAL DAILY
Qty: 90 TABLET | Refills: 3 | Status: SHIPPED | OUTPATIENT
Start: 2021-09-29 | End: 2022-08-26

## 2021-09-29 NOTE — TELEPHONE ENCOUNTER
Routing refill request to provider for review/approval because:  Pt was going give # 19 please review and send RF if appropriate     Geovanna Land RN

## 2021-11-03 DIAGNOSIS — Z11.59 ENCOUNTER FOR SCREENING FOR OTHER VIRAL DISEASES: ICD-10-CM

## 2021-11-13 ENCOUNTER — HEALTH MAINTENANCE LETTER (OUTPATIENT)
Age: 36
End: 2021-11-13

## 2021-11-28 ENCOUNTER — LAB (OUTPATIENT)
Dept: URGENT CARE | Facility: URGENT CARE | Age: 36
End: 2021-11-28
Attending: INTERNAL MEDICINE
Payer: COMMERCIAL

## 2021-11-28 DIAGNOSIS — Z11.59 ENCOUNTER FOR SCREENING FOR OTHER VIRAL DISEASES: ICD-10-CM

## 2021-11-28 PROCEDURE — U0005 INFEC AGEN DETEC AMPLI PROBE: HCPCS

## 2021-11-28 PROCEDURE — U0003 INFECTIOUS AGENT DETECTION BY NUCLEIC ACID (DNA OR RNA); SEVERE ACUTE RESPIRATORY SYNDROME CORONAVIRUS 2 (SARS-COV-2) (CORONAVIRUS DISEASE [COVID-19]), AMPLIFIED PROBE TECHNIQUE, MAKING USE OF HIGH THROUGHPUT TECHNOLOGIES AS DESCRIBED BY CMS-2020-01-R: HCPCS

## 2021-11-29 LAB — SARS-COV-2 RNA RESP QL NAA+PROBE: NEGATIVE

## 2021-12-01 ENCOUNTER — HOSPITAL ENCOUNTER (OUTPATIENT)
Facility: CLINIC | Age: 36
Discharge: HOME | End: 2021-12-01
Attending: INTERNAL MEDICINE | Admitting: INTERNAL MEDICINE
Payer: COMMERCIAL

## 2021-12-01 VITALS
SYSTOLIC BLOOD PRESSURE: 129 MMHG | OXYGEN SATURATION: 98 % | HEART RATE: 87 BPM | BODY MASS INDEX: 38.65 KG/M2 | HEIGHT: 65 IN | DIASTOLIC BLOOD PRESSURE: 78 MMHG | RESPIRATION RATE: 16 BRPM | TEMPERATURE: 98 F | WEIGHT: 232 LBS

## 2021-12-01 LAB
COLONOSCOPY: NORMAL
UPPER GI ENDOSCOPY: NORMAL

## 2021-12-01 PROCEDURE — 99153 MOD SED SAME PHYS/QHP EA: CPT | Performed by: INTERNAL MEDICINE

## 2021-12-01 PROCEDURE — G0500 MOD SEDAT ENDO SERVICE >5YRS: HCPCS | Performed by: INTERNAL MEDICINE

## 2021-12-01 PROCEDURE — 250N000011 HC RX IP 250 OP 636: Performed by: INTERNAL MEDICINE

## 2021-12-01 PROCEDURE — 45378 DIAGNOSTIC COLONOSCOPY: CPT | Performed by: INTERNAL MEDICINE

## 2021-12-01 PROCEDURE — 43235 EGD DIAGNOSTIC BRUSH WASH: CPT | Mod: 74 | Performed by: INTERNAL MEDICINE

## 2021-12-01 RX ORDER — FENTANYL CITRATE 50 UG/ML
50-100 INJECTION, SOLUTION INTRAMUSCULAR; INTRAVENOUS
Status: COMPLETED | OUTPATIENT
Start: 2021-12-01 | End: 2021-12-01

## 2021-12-01 RX ORDER — FENTANYL CITRATE 50 UG/ML
25-50 INJECTION, SOLUTION INTRAMUSCULAR; INTRAVENOUS
Status: DISCONTINUED | OUTPATIENT
Start: 2021-12-01 | End: 2021-12-01 | Stop reason: HOSPADM

## 2021-12-01 RX ORDER — ONDANSETRON 4 MG/1
4 TABLET, ORALLY DISINTEGRATING ORAL EVERY 6 HOURS PRN
Status: DISCONTINUED | OUTPATIENT
Start: 2021-12-01 | End: 2021-12-01 | Stop reason: HOSPADM

## 2021-12-01 RX ORDER — PROCHLORPERAZINE MALEATE 10 MG
10 TABLET ORAL EVERY 6 HOURS PRN
Status: DISCONTINUED | OUTPATIENT
Start: 2021-12-01 | End: 2021-12-01 | Stop reason: HOSPADM

## 2021-12-01 RX ORDER — SIMETHICONE 40MG/0.6ML
133 SUSPENSION, DROPS(FINAL DOSAGE FORM)(ML) ORAL
Status: DISCONTINUED | OUTPATIENT
Start: 2021-12-01 | End: 2021-12-01 | Stop reason: HOSPADM

## 2021-12-01 RX ORDER — EPINEPHRINE 1 MG/ML
0.1 INJECTION, SOLUTION INTRAMUSCULAR; SUBCUTANEOUS
Status: DISCONTINUED | OUTPATIENT
Start: 2021-12-01 | End: 2021-12-01 | Stop reason: HOSPADM

## 2021-12-01 RX ORDER — ATROPINE SULFATE 0.4 MG/ML
0.4 AMPUL (ML) INJECTION
Status: DISCONTINUED | OUTPATIENT
Start: 2021-12-01 | End: 2021-12-01 | Stop reason: HOSPADM

## 2021-12-01 RX ORDER — FAMOTIDINE 20 MG/1
20 TABLET, FILM COATED ORAL DAILY
COMMUNITY

## 2021-12-01 RX ORDER — LIDOCAINE 40 MG/G
CREAM TOPICAL
Status: DISCONTINUED | OUTPATIENT
Start: 2021-12-01 | End: 2021-12-01 | Stop reason: HOSPADM

## 2021-12-01 RX ORDER — NALOXONE HYDROCHLORIDE 0.4 MG/ML
0.2 INJECTION, SOLUTION INTRAMUSCULAR; INTRAVENOUS; SUBCUTANEOUS
Status: DISCONTINUED | OUTPATIENT
Start: 2021-12-01 | End: 2021-12-01 | Stop reason: HOSPADM

## 2021-12-01 RX ORDER — NALOXONE HYDROCHLORIDE 0.4 MG/ML
0.4 INJECTION, SOLUTION INTRAMUSCULAR; INTRAVENOUS; SUBCUTANEOUS
Status: DISCONTINUED | OUTPATIENT
Start: 2021-12-01 | End: 2021-12-01 | Stop reason: HOSPADM

## 2021-12-01 RX ORDER — FLUMAZENIL 0.1 MG/ML
0.2 INJECTION, SOLUTION INTRAVENOUS
Status: DISCONTINUED | OUTPATIENT
Start: 2021-12-01 | End: 2021-12-01 | Stop reason: HOSPADM

## 2021-12-01 RX ORDER — ONDANSETRON 2 MG/ML
4 INJECTION INTRAMUSCULAR; INTRAVENOUS
Status: COMPLETED | OUTPATIENT
Start: 2021-12-01 | End: 2021-12-01

## 2021-12-01 RX ORDER — ONDANSETRON 2 MG/ML
4 INJECTION INTRAMUSCULAR; INTRAVENOUS EVERY 6 HOURS PRN
Status: DISCONTINUED | OUTPATIENT
Start: 2021-12-01 | End: 2021-12-01 | Stop reason: HOSPADM

## 2021-12-01 RX ADMIN — MIDAZOLAM 2 MG: 1 INJECTION INTRAMUSCULAR; INTRAVENOUS at 12:12

## 2021-12-01 RX ADMIN — ONDANSETRON 4 MG: 2 INJECTION INTRAMUSCULAR; INTRAVENOUS at 11:51

## 2021-12-01 RX ADMIN — FENTANYL CITRATE 100 MCG: 50 INJECTION INTRAMUSCULAR; INTRAVENOUS at 12:13

## 2021-12-01 ASSESSMENT — MIFFLIN-ST. JEOR: SCORE: 1738.23

## 2021-12-01 NOTE — H&P
Pre-Endoscopy History and Physical     Sayda Valles MRN# [id number]   YOB: 1985 Age: 36 year old     Date of Procedure: 12/1/2021  Primary care provider: Brittany Miles  Type of Endoscopy: Colonoscopy with possible biopsy, possible polypectomy and Gastroscopy with possible biopsy, possible dilation  Reason for Procedure: anemia  Type of Anesthesia Anticipated: Conscious Sedation    HPI:    Sayda is a 36 year old female who will be undergoing the above procedure.      A history and physical has been performed. The patient's medications and allergies have been reviewed. The risks and benefits of the procedure and the sedation options and risks were discussed with the patient.  All questions were answered and informed consent was obtained.      She denies a personal or family history of anesthesia complications or bleeding disorders.     Patient Active Problem List   Diagnosis     Allergic rhinitis     Encounter for pre-operative cardiovascular clearance     Varicose Veins of Legs- painful with standing     Abnormal Mammogram- 2/2006 - prob benign      Heart palpitations - since 2010 intermittent. Reports multiple work-ups with unclear cause. Followed by cardiology.     CARDIOVASCULAR SCREENING; LDL GOAL LESS THAN 160     Anxiety     Migraine with aura and without status migrainosus, not intractable - since 2003; stable.     Mild persistent asthma without complication     Bilateral ankle pain     Family history of breast cancer - paternal grandma, paternal aunt and paternal great-grandmother. Dad completed genetic screening which was neg.      Class 2 obesity due to excess calories without serious comorbidity with body mass index (BMI) of 37.0 to 37.9 in adult     Segmental dysfunction of cervical region     Segmental dysfunction of thoracic region     Segmental dysfunction of lumbar region     Segmental dysfunction of sacral region     Mechanical back pain     Stroke (H)     Vertebral artery  dissection (H)     Borderline Aortic root enlargement (H)     History of stroke     Brain aneurysm     Hypertension goal BP (blood pressure) < 140/90     Morbid obesity (H)     Patent foramen ovale        Past Medical History:   Diagnosis Date     Allergic rhinitis, cause unspecified     Allergic rhinitis     Aneurysm (H)     3.6 mm aneurysm off the ophthalmic L IC art  Seen CT, MRI     Cervicalgia 3/17/2011     Encounter for other general counseling or advice on contraception 2007     Diagnosis updated by automated process. Provider to review and confirm.     Family history of malignant neoplasm of breast     4 generations on her father's side     FAMILY HX BREAST MALIG      Heart palpitations 2/10    PVC's - dr sandhu     Hypertension goal BP (blood pressure) < 140/90 9/15/2020     Migraine without aura, with intractable migraine, so stated, without mention of status migrainosus     sees neurologist     Mild persistent asthma      Non morbid obesity due to excess calories 2016     Simple goiter 2008    pt has no enlargement and had normal blood work-up at that time     Stroke (H) 06/15/2019    due to L vert artery dissection after chiropractor manipulation        Past Surgical History:   Procedure Laterality Date     NO HISTORY OF SURGERY         Social History     Tobacco Use     Smoking status: Former Smoker     Packs/day: 0.50     Years: 2.00     Pack years: 1.00     Types: Cigarettes     Start date:      Quit date: 2001     Years since quittin.8     Smokeless tobacco: Never Used   Substance Use Topics     Alcohol use: Never       Family History   Problem Relation Age of Onset     Lipids Father      Neurologic Disorder Father         epilepsy     Heart Disease Father 47        MI     Hypertension Father      Hyperlipidemia Father      Lipids Sister      Hyperlipidemia Sister      Breast Cancer Paternal Grandmother         3rd generation      Breast Cancer Paternal Aunt      Diabetes  "Mother         gestational      Asthma Mother      Diabetes Paternal Grandfather      Other Cancer Maternal Grandfather         Skin     Hyperlipidemia Paternal Half-Sister      Breast Cancer Other      Other Cancer Maternal Grandmother         Lung     Ovarian Cancer Maternal Grandmother      Lung Cancer Maternal Grandmother      Diabetes Maternal Uncle      Diabetes Maternal Uncle      Colon Cancer No family hx of        Prior to Admission medications    Medication Sig Start Date End Date Taking? Authorizing Provider   acetaminophen (TYLENOL) 500 MG tablet Take 1,000 mg by mouth 2 times daily as needed     Unknown, Entered By History   albuterol (PROAIR HFA/PROVENTIL HFA/VENTOLIN HFA) 108 (90 Base) MCG/ACT inhaler Inhale 1-2 puffs into the lungs every 4 hours as needed for shortness of breath / dyspnea or wheezing 9/22/20   Brittany Miles, APRN CNP   aspirin (ASA) 81 MG EC tablet Take 1 tablet (81 mg) by mouth daily 6/17/19   Freddie Powell,    cyclobenzaprine (FLEXERIL) 10 MG tablet Take 1 tablet (10 mg) by mouth 2 times daily as needed for muscle spasms 10/9/19   Day, Rani [place] PA-C   loratadine (CLARITIN) 10 MG tablet Take 10 mg by mouth daily    Reported, Patient   metoprolol succinate ER (TOPROL-XL) 50 MG 24 hr tablet TAKE 1 TABLET (50 MG) BY MOUTH DAILY 9/29/21   Brittany Miles APRN CNP   Multiple Vitamins-Minerals (MULTIVITAMIN GUMMIES WOMENS) CHEW Smarty Pants Women's Complete    Reported, Patient   VITAMIN D PO Take 2,000 Int'l Units by mouth    Reported, Patient       Allergies   Allergen Reactions     Zithromax [Azithromycin Dihydrate] GI Disturbance        REVIEW OF SYSTEMS:   5 point ROS negative except as noted above in HPI, including Gen., Resp., CV, GI &  system review.    PHYSICAL EXAM:   There were no vitals taken for this visit. Estimated body mass index is 39.77 kg/m  as calculated from the following:    Height as of 12/29/20: 1.651 m (5' 5\").    Weight as of 5/25/21: 108.4 kg " (239 lb).   GENERAL APPEARANCE: alert, and oriented  MENTAL STATUS: alert  AIRWAY EXAM: Mallampatti Class I (visualization of the soft palate, fauces, uvula, anterior and posterior pillars)  RESP: lungs clear to auscultation - no rales, rhonchi or wheezes  CV: regular rates and rhythm  DIAGNOSTICS:    Not indicated    IMPRESSION   ASA Class 2 - Mild systemic disease    PLAN:   Plan for Colonoscopy with possible biopsy, possible polypectomy and Gastroscopy with possible biopsy, possible dilation. We discussed the risks, benefits and alternatives and the patient wished to proceed.    The above has been forwarded to the consulting provider.      Signed Electronically by: Gume Douglas MD  December 1, 2021

## 2021-12-03 ENCOUNTER — MYC MEDICAL ADVICE (OUTPATIENT)
Dept: FAMILY MEDICINE | Facility: CLINIC | Age: 36
End: 2021-12-03
Payer: COMMERCIAL

## 2021-12-08 NOTE — TELEPHONE ENCOUNTER
Would recommend telephone or video visit for further follow-up regarding concerns in MyChart note.  - [place], CNP

## 2021-12-10 ENCOUNTER — VIRTUAL VISIT (OUTPATIENT)
Dept: FAMILY MEDICINE | Facility: CLINIC | Age: 36
End: 2021-12-10
Payer: COMMERCIAL

## 2021-12-10 DIAGNOSIS — D64.9 LOW HEMOGLOBIN: Primary | ICD-10-CM

## 2021-12-10 DIAGNOSIS — R10.11 RUQ ABDOMINAL PAIN: ICD-10-CM

## 2021-12-10 DIAGNOSIS — R19.8 CHANGE IN BOWEL MOVEMENT: ICD-10-CM

## 2021-12-10 DIAGNOSIS — M54.6 CHRONIC BILATERAL THORACIC BACK PAIN: ICD-10-CM

## 2021-12-10 DIAGNOSIS — G89.29 CHRONIC BILATERAL THORACIC BACK PAIN: ICD-10-CM

## 2021-12-10 PROCEDURE — 99214 OFFICE O/P EST MOD 30 MIN: CPT | Mod: GT | Performed by: NURSE PRACTITIONER

## 2021-12-10 NOTE — PROGRESS NOTES
"Sayda is a 36 year old who is being evaluated via a billable video visit.      How would you like to obtain your AVS? MyChart  If the video visit is dropped, the invitation should be resent by: Text to cell phone: [phone]  Will anyone else be joining your video visit? No    Video Start Time: 1020    Assessment & Plan   Problem List Items Addressed This Visit     None      Visit Diagnoses     Low hemoglobin    -  Primary    Change in bowel movement        RUQ abdominal pain        Relevant Orders    [facility]    Chronic bilateral thoracic back pain        Relevant Orders    [facility] Referral            PT referral to evaluate and treat thoracic and upper abdominal area/rib area pain on right. Further referral to ortho/sports med provider if needed.    Trial fiber supplmeent for stools.     Discussed Slow-Fe iron. Recheck in 1 months.    BMI:   Estimated body mass index is 38.61 kg/m  as calculated from the following:    Height as of 12/1/21: 1.651 m (5' 5\").    Weight as of 12/1/21: 105.2 kg (232 lb).   Weight management plan: Discussed healthy diet and exercise guidelines    See Patient Instructions    Return in about 3 months (around 3/10/2022) for symptoms failing to improve or worsening.    Kelly Monsalve, [facility]   Sayda is a 36 year old who presents for the following health issues     HPI     Concern - abdominal pain - ongoing issue - colonoscopy was normal - couldn't complete the endoscopy  Onset: ongoing issue since May  Description: Abdominal pain - always there - feels like a pulled muscle.   Intensity: moderate  Progression of Symptoms:  Worsening - but intermittent in severity  Accompanying Signs & Symptoms: feels more bloated, has also has had bowel movement changes (smaller in quantity as well as going every other day instead of daily)   Previous history of similar problem: history of acid reflux - takes pepcid - history of " anemia  Precipitating factors:        Worsened by: standing, moving - activity makes it worse  Alleviating factors:        Improved by: did cut out dairy for a few days and it seems to feel better so unsure if it could be related to that.   Therapies tried and outcome: tylenol helps       Started in May, testing so far is normal.  Couldn't tolerate EGD, but GI wasn't concerned aobut EGD, thinks bleeding is from aspirin.   Stool caliber changes. Feels bloated at times.    Back pain as well in thoracic/bra line area pain. Previous CT showed some degenerative changes in this area of the spine. GI doctor thought potentially symptoms sounded more musculoskeletal after discussion.        Review of Systems   Constitutional, HEENT, cardiovascular, pulmonary, GI, , musculoskeletal, neuro, skin, endocrine and psych systems are negative, except as otherwise noted.      Objective           Vitals:  No vitals were obtained today due to virtual visit.    Physical Exam   GENERAL: Healthy, alert and no distress  EYES: Eyes grossly normal to inspection.  No discharge or erythema, or obvious scleral/conjunctival abnormalities.  RESP: No audible wheeze, cough, or visible cyanosis.  No visible retractions or increased work of breathing.    SKIN: Visible skin clear. No significant rash, abnormal pigmentation or lesions.  NEURO: Cranial nerves grossly intact.  Mentation and speech appropriate for age.  PSYCH: Mentation appears normal, affect normal/bright, judgement and insight intact, normal speech and appearance well-groomed.    Admission on 12/01/2021, Discharged on 12/01/2021   Component Date Value Ref Range Status     Upper GI Endoscopy 12/01/2021    Final                    Value:[facility]  _______________________________________________________________________________  Patient Name: Sayda Valles           Procedure Date: 12/1/2021 11:30 AM  MRN: [id number]                       Account Number:  [id number]  YOB: 1985             Admit Type: Outpatient  Age: 36                               Gender: Female  Attending MD: Gume Douglas MD   Total Sedation Time: 0_minutes continuous   bedside 1:1  Instrument Name: 209 - Gastroscope      _______________________________________________________________________________     Procedure:                Upper GI endoscopy  Providers:                Gume Douglas MD (Doctor)  Referring MD:               Procedure:                Pre-Anesthesia Assessment:                            - Prior to the procedure, a History and Physical                             was performed, and patient medications and                             allergies were reviewed. The patient is competen                          t.                             The risks and benefits of the procedure and the                             sedation options and risks were discussed with the                             patient. All questions were answered and informed                             consent was obtained. Patient identification and                             proposed procedure were verified in the                             pre-procedure area. Mental Status Examination:                             alert and oriented. Airway Examination: normal                             oropharyngeal airway and neck mobility. Respiratory                             Examination: clear to auscultation. CV Examination:                             normal. Prophylactic Antibiotics: The patient does                             not require prophylactic antibiotics. Prior                             Anticoagulants: The patient has taken no                             anticoagulant or antiplatelet agents. ASA Grade                                                       Assessment: II - A patient with mild systemic                             disease. After reviewing the risks and benefits,                              the patient was deemed in satisfactory condition to                             undergo the procedure. The anesthesia plan was to                             use moderate sedation / analgesia (conscious                             sedation). Immediately prior to administration of                             medications, the patient was re-assessed for                             adequacy to receive sedatives. The heart rate,                             respiratory rate, oxygen saturations, blood                             pressure, adequacy of pulmonary ventilation, and                             response to care were monitored throughout the                             procedure. The physical status of the patient was                             re-assessed after the procedure.                            After obtaining                           informed consent, the endoscope was                             passed under direct vision. Throughout the                             procedure, the patient's blood pressure, pulse, and                             oxygen saturations were monitored continuously. The                             procedure was aborted. The scope was not inserted.                             Medications were given. The procedure was aborted                             [Reason]. [Maneuvers Attempted].                                                                                   Findings:                                                                                                      Electronically signed by Gume Douglas MD  ____________________  Gume Douglas MD  12/1/2021 12:53:47 PM  I was physically present for the entire viewing portion of the exam.  __________________________Gume Douglas MD  Number of Addenda: 0    Note Initiated On: 12/1/2021 11:30 AM  MRN:                      [id number]                          [id number]  Procedure Date:            12/1/2021 11:30:23 AM  Total Procedure Duration: 0 hours 1 minute 21 seconds   Estimated Blood Loss:       Scope In: 12:27:03 PM  Scope Out: 12:28:24 PM       COLONOSCOPY 12/01/2021    Final                    Value:[facility]  _______________________________________________________________________________  Patient Name: Sayda Valles           Procedure Date: 12/1/2021 11:29 AM  MRN: [id number]                       Account Number: [id number]  YOB: 1985             Admit Type: Outpatient  Age: 36                               Gender: Female  Attending MD: Gume Douglas MD   Total Sedation Time: 12_minutes continuous   bedside 1:1  Instrument Name: 222 - Adult Colonoscope   _______________________________________________________________________________     Procedure:                Colonoscopy  Indications:              Iron deficiency anemia  Providers:                Gume Douglas MD (Doctor)  Referring MD:               Medicines:                Midazolam 2 mg IV, Fentanyl 100 micrograms IV  Complications:            No immediate complications.  _______________________________________________________________________________  Procedure:                                          Pre-Anesthesia Assessment:                            - Prior to the procedure, a History and Physical                             was performed, and patient medications and                             allergies were reviewed. The patient is competent.                             The risks and benefits of the procedure and the                             sedation options and risks were discussed with the                             patient. All questions were answered and informed                             consent was obtained. Patient identification and                             proposed procedure were verified by the physician                             in the procedure room. Mental  Status Examination:                             alert and oriented. Airway Examination: normal                             oropharyngeal airway and neck mobility. Respiratory                             Examination: clear to auscultation. CV Examination:                             normal. Prop                          hylactic Antibiotics: The patient does                             not require prophylactic antibiotics. Prior                             Anticoagulants: The patient has taken no                             anticoagulant or antiplatelet agents. ASA Grade                             Assessment: II - A patient with mild systemic                             disease. After reviewing the risks and benefits,                             the patient was deemed in satisfactory condition to                             undergo the procedure. The anesthesia plan was to                             use moderate sedation / analgesia (conscious                             sedation). Immediately prior to administration of                             medications, the patient was re-assessed for                             adequacy to receive sedatives. The heart rate,                             respiratory rate, oxygen saturations, blood                             pressure, adequacy of pulmonary vent                          ilation, and                             response to care were monitored throughout the                             procedure. The physical status of the patient was                             re-assessed after the procedure.                            After obtaining informed consent, the colonoscope                             was passed under direct vision. Throughout the                             procedure, the patient's blood pressure, pulse, and                             oxygen saturations were monitored continuously. The                             Olympus Adult Colonoscope, Model #  CF-NI714O,                             [place] # 222, SN # [id number] was introduced through                             the anus and advanced to the cecum, identified by                             appendiceal orifice and ileocecal valve. The                             colonoscopy was performed without difficulty. The                             patient tolerated the procedure well. The quality                                                       of the bowel preparation was good. The ileocecal                             valve, appendiceal orifice, and rectum were                             photographed.                                                                                   Findings:       The perianal and digital rectal examinations were normal.       The entire examined colon appeared normal on direct and retroflexion        views.                                                                                   Impression:               - The entire examined colon is normal on direct and                             retroflexion views.                            - No specimens collected.  Recommendation:           - Repeat colonoscopy at age 49 for screening                             purposes.                                                                                   Procedure Code(s):       --- Professional ---       61526, Colonoscopy, flexible; diagnostic, including collectio                          n of        specimen(s) by brushing or washing, when performed (separate procedure)  Diagnosis Code(s):       --- Professional ---       D50.9, Iron deficiency anemia, unspecified    CPT copyright 2020 [employer]. All rights reserved.    The codes documented in this report are preliminary and upon  review may   be revised to meet current compliance requirements.    Electronically signed by Gume Douglas MD  ____________________  Gume Douglas MD  12/1/2021 12:56:47  PM  I was physically present for the entire viewing portion of the exam.  __________________________Gume Douglas MD  Number of Addenda: 0    Note Initiated On: 12/1/2021 11:29 AM  MRN:                      [id number]  Procedure Date:           12/1/2021 11:29:52 AM  Scope Withdrawal Time: 0 hours 6 minutes 11 seconds   Total Procedure Duration: 0 hours 11 minutes 51 seconds   Estimated Blood Loss:       Scope In: 12:32:48 PM  Scope Out: 12:44:39 PM                   Video-Visit Details    Type of service:  Video Visit    Video End Time:10:52 AM    Originating Location (pt. Location): Home    Distant Location (provider location):  [facility]     Platform used for Video Visit: Dylan

## 2022-03-05 ENCOUNTER — HEALTH MAINTENANCE LETTER (OUTPATIENT)
Age: 37
End: 2022-03-05

## 2022-04-04 ENCOUNTER — MYC MEDICAL ADVICE (OUTPATIENT)
Dept: FAMILY MEDICINE | Facility: CLINIC | Age: 37
End: 2022-04-04
Payer: COMMERCIAL

## 2022-04-04 DIAGNOSIS — D50.9 IRON DEFICIENCY ANEMIA, UNSPECIFIED IRON DEFICIENCY ANEMIA TYPE: Primary | ICD-10-CM

## 2022-04-04 NOTE — TELEPHONE ENCOUNTER
Please see my chart message below     Please review and advise     Thank you     Jamaica Woodruff RN, BSN  [facility]

## 2022-05-09 ENCOUNTER — NURSE TRIAGE (OUTPATIENT)
Dept: FAMILY MEDICINE | Facility: CLINIC | Age: 37
End: 2022-05-09
Payer: COMMERCIAL

## 2022-05-09 NOTE — TELEPHONE ENCOUNTER
"Per RN note below:   \"She lives in [facility].  Is seen at [facility].  No available appointment.  She was instructed needs to be seen; she will be seen in urgent care near her today.  She states will check and see where closest urgent care is near her in her network.\"    Called and spoke with patient.     She is sitting in ER in [place]. UC wanted her to be seen in ER. BP elevated. Would be good to still follow up on Friday. Patient will plan to keep appointment on Friday. Patient stated an understanding and agreed with plan.     Felipa Machado RN  [facility]        "

## 2022-05-09 NOTE — TELEPHONE ENCOUNTER
"SECOND LEVEL TRIAGE    Reason for Disposition    Skipped or extra beat(s) and occurs 4 or more times per minute    Additional Information    Negative: Passed out (i.e., fainted, collapsed and was not responding)    Negative: Shock suspected (e.g., cold/pale/clammy skin, too weak to stand, low BP, rapid pulse)    Negative: Difficult to awaken or acting confused (e.g., disoriented, slurred speech)    Negative: Visible sweat on face or sweat dripping down face    Negative: Unable to walk, or can only walk with assistance (e.g., requires support)    Negative: Received SHOCK from implantable cardiac defibrillator and has persisting symptoms (i.e., palpitations, lightheadedness)    Negative: Dizziness, lightheadedness, or weakness and heart beating very rapidly (e.g., > 140 / minute)    Negative: Dizziness, lightheadedness, or weakness and heart beating very slowly (e.g., < 50 / minute)    Negative: Sounds like a life-threatening emergency to the triager    Negative: Chest pain    Negative: Difficulty breathing    Negative: Dizziness, lightheadedness, or weakness    Negative: Heart beating very rapidly (e.g., > 140 / minute) and present now (Exception: during exercise)    Negative: Heart beating very slowly (e.g., < 50 / minute) (Exception: athlete)    Negative: New or worsened shortness of breath with activity (dyspnea on exertion)    Negative: Patient sounds very sick or weak to the triager    Negative: Wearing a 'Holter monitor' or 'cardiac event monitor'    Negative: Received SHOCK from implantable cardiac defibrillator (and now feels well)    Negative: Heart beating very rapidly (e.g., > 140 / minute) and not present now (Exception: during exercise)    Answer Assessment - Initial Assessment Questions  1. DESCRIPTION: \"Please describe your heart rate or heart beat that you are having\" (e.g., fast/slow, regular/irregular, skipped or extra beats, \"palpitations\")      Describes as \"little flutters\"  States heart is not " "racing; is not experiencing skipped beats  2. ONSET: \"When did it start?\" (Minutes, hours or days)       Sunday.  Patient reports she had child's prom on Saturday and assisted with the post prom part as well so was up til 4am.  States got 5 hrs of sleep. Was very tired.  Reports did not have much fluid intake on Saturday due to busy with prom.  Denies any caffeine or energy drinks x3 years.   Noticed the \"little flutters\" all day Sunday.  States would occur every few seconds to minutes.  She states her Apple Watch has an ECG marylou; she checked it 3x and sent to her cousin who reads ECG's for her profession.  She states saw a few PVC's on one but patient states has had PVC's on ECG's in past.  She states that last one didn't show any.  3. DURATION: \"How long does it last\" (e.g., seconds, minutes, hours)      She states were present all day Sunday.  States has only had a few very brief flutters this morning.  States no chest pain.  No shortness of breath.  No facial drooping, no weakness of any extremities, no lightheadedness.  She states feels fine.  Has had approx 32 oz of fluids today.    4. PATTERN \"Does it come and go, or has it been constant since it started?\"  \"Does it get worse with exertion?\"   \"Are you feeling it now?\"     See above.    5. TAP: \"Using your hand, can you tap out what you are feeling on a chair or table in front of you, so that I can hear?\" (Note: not all patients can do this)       6. HEART RATE: \"Can you tell me your heart rate?\" \"How many beats in 15 seconds?\"  (Note: not all patients can do this)     Patient states that her BP yesterday pm was 130/97  HR: 70.  12:15pm today 137/100 HR: 84.  Checked now and is 132/104.    7. RECURRENT SYMPTOM: \"Have you ever had this before?\" If so, ask: \"When was the last time?\" and \"What happened that time?\"       See above.  She then states that she did have a stroke 3 years ago; artery dissection.  Has had holter monitors which she states have been " "normal.  Is on Metoprolol.  Takes daily.  She lives in [facility].  Is seen at [facility].  No available appointment.  She was instructed needs to be seen; she will be seen in urgent care near her today.  She states will check and see where closest urgent care is near her in her network.    8. CAUSE: \"What do you think is causing the palpitations?\"      She feels that the flutters are from not drinking fluids and decreased amount of sleep due to prom events  9. CARDIAC HISTORY: \"Do you have any history of heart disease?\" (e.g., heart attack, angina, bypass surgery, angioplasty, arrhythmia)       Yes.  See above  10. OTHER SYMPTOMS: \"Do you have any other symptoms?\" (e.g., dizziness, chest pain, sweating, difficulty breathing)        Denies.  Please note that per her most recent labwork she does have anemia and is to have had her labwork rechecked.  Patient states is aware she is overdue to have her labwork redone.  I did advise she do this.  11. PREGNANCY: \"Is there any chance you are pregnant?\" \"When was your last menstrual period?\"    Protocols used: HEART RATE AND HEARTBEAT [id number]      "

## 2022-05-13 ENCOUNTER — OFFICE VISIT (OUTPATIENT)
Dept: FAMILY MEDICINE | Facility: CLINIC | Age: 37
End: 2022-05-13
Payer: COMMERCIAL

## 2022-05-13 VITALS
OXYGEN SATURATION: 96 % | WEIGHT: 237 LBS | HEART RATE: 92 BPM | DIASTOLIC BLOOD PRESSURE: 88 MMHG | BODY MASS INDEX: 39.49 KG/M2 | SYSTOLIC BLOOD PRESSURE: 118 MMHG | HEIGHT: 65 IN | TEMPERATURE: 98.3 F

## 2022-05-13 DIAGNOSIS — H00.12 CHALAZION OF RIGHT LOWER EYELID: ICD-10-CM

## 2022-05-13 DIAGNOSIS — R00.2 PALPITATIONS: Primary | ICD-10-CM

## 2022-05-13 DIAGNOSIS — D50.9 IRON DEFICIENCY ANEMIA, UNSPECIFIED IRON DEFICIENCY ANEMIA TYPE: ICD-10-CM

## 2022-05-13 LAB
ERYTHROCYTE [DISTWIDTH] IN BLOOD BY AUTOMATED COUNT: 13.1 % (ref 10–15)
FERRITIN SERPL-MCNC: 12 NG/ML (ref 12–150)
HCT VFR BLD AUTO: 38 % (ref 35–47)
HGB BLD-MCNC: 12.4 G/DL (ref 11.7–15.7)
IRON SATN MFR SERPL: 20 % (ref 15–46)
IRON SERPL-MCNC: 64 UG/DL (ref 35–180)
MCH RBC QN AUTO: 26 PG (ref 26.5–33)
MCHC RBC AUTO-ENTMCNC: 32.6 G/DL (ref 31.5–36.5)
MCV RBC AUTO: 80 FL (ref 78–100)
PLATELET # BLD AUTO: 285 10E3/UL (ref 150–450)
RBC # BLD AUTO: 4.77 10E6/UL (ref 3.8–5.2)
TIBC SERPL-MCNC: 328 UG/DL (ref 240–430)
WBC # BLD AUTO: 5.1 10E3/UL (ref 4–11)

## 2022-05-13 PROCEDURE — 83550 IRON BINDING TEST: CPT | Performed by: NURSE PRACTITIONER

## 2022-05-13 PROCEDURE — 99213 OFFICE O/P EST LOW 20 MIN: CPT | Performed by: NURSE PRACTITIONER

## 2022-05-13 PROCEDURE — 36415 COLL VENOUS BLD VENIPUNCTURE: CPT | Performed by: NURSE PRACTITIONER

## 2022-05-13 PROCEDURE — 85027 COMPLETE CBC AUTOMATED: CPT | Performed by: NURSE PRACTITIONER

## 2022-05-13 PROCEDURE — 82728 ASSAY OF FERRITIN: CPT | Performed by: NURSE PRACTITIONER

## 2022-05-13 RX ORDER — ERYTHROMYCIN 5 MG/G
0.5 OINTMENT OPHTHALMIC 2 TIMES DAILY
Qty: 7 G | Refills: 0 | Status: SHIPPED | OUTPATIENT
Start: 2022-05-13 | End: 2022-05-20

## 2022-05-13 RX ORDER — SENNOSIDES 8.6 MG
1300 CAPSULE ORAL PRN
COMMUNITY
End: 2023-11-13

## 2022-05-13 ASSESSMENT — ASTHMA QUESTIONNAIRES: ACT_TOTALSCORE: 25

## 2022-05-17 NOTE — RESULT ENCOUNTER NOTE
Dear Sayda,     -Normal red blood cell (hgb) levels, normal white blood cell count and normal platelet levels.  -Ferritin (iron) level is normal.  -Total iron saturation levels are normal as well.      I would consider taking a daily multivitamin with iron to ensure adequate supplementation.      Please send a [employer] message or call [phone]  if you have any questions.      Brittany Miles, APRN, CNP  [facility]    If you have further questions about the interpretation of your labs, [email or web address] is a good website to check out for further information.

## 2022-05-30 ENCOUNTER — NURSE TRIAGE (OUTPATIENT)
Dept: NURSING | Facility: CLINIC | Age: 37
End: 2022-05-30
Payer: COMMERCIAL

## 2022-05-30 NOTE — TELEPHONE ENCOUNTER
Is Calling to get metoprolol succinate ER (TOPROL-XL) 50 MG RX transferred to [facility] at [address] [place] [address].      Alvina Grace RN, [facility] Triage Nurse Advisor    Reason for Disposition    [1] Prescription prescribed recently is not at pharmacy AND [2] triager has access to patient's EMR AND [3] prescription is recorded in the EMR    Protocols used: MEDICATION QUESTION CALL-A-AH

## 2022-07-29 ENCOUNTER — MYC MEDICAL ADVICE (OUTPATIENT)
Dept: FAMILY MEDICINE | Facility: CLINIC | Age: 37
End: 2022-07-29

## 2022-07-29 DIAGNOSIS — M25.562 ACUTE PAIN OF LEFT KNEE: Primary | ICD-10-CM

## 2022-08-24 DIAGNOSIS — I10 BENIGN ESSENTIAL HYPERTENSION: ICD-10-CM

## 2022-08-26 RX ORDER — METOPROLOL SUCCINATE 50 MG/1
50 TABLET, EXTENDED RELEASE ORAL DAILY
Qty: 90 TABLET | Refills: 0 | Status: SHIPPED | OUTPATIENT
Start: 2022-08-26 | End: 2022-11-16

## 2022-08-26 NOTE — TELEPHONE ENCOUNTER
Prescription approved per [facility] Refill Protocol.    Louise HERNANDES RN

## 2022-11-16 DIAGNOSIS — I10 BENIGN ESSENTIAL HYPERTENSION: ICD-10-CM

## 2022-11-16 RX ORDER — METOPROLOL SUCCINATE 50 MG/1
50 TABLET, EXTENDED RELEASE ORAL DAILY
Qty: 90 TABLET | Refills: 1 | Status: SHIPPED | OUTPATIENT
Start: 2022-11-16 | End: 2023-05-22

## 2022-11-19 ENCOUNTER — HEALTH MAINTENANCE LETTER (OUTPATIENT)
Age: 37
End: 2022-11-19

## 2023-01-01 NOTE — TELEPHONE ENCOUNTER
Caller called for not feeling okay, ranges from being sensitive to light, inability to stay focussed, slight headache x 6 days (no headache today), possible high blood pressure (on blood pressure medication already) (blood pressure values between 115-142 for systolic, diastolic between ).Per patient, baseline is around 130/90  Caller states that she had a stroke last June (2019).  Caller denied any pressure feelings in head, no chest pain or breathing difficulties  States that she has a small aneurysm that will be repaired in September.  This triage nurse noticed that patient was anxious,reassurance given, advised to monitor blood pressure,  vision changes, weakness of any part of the body, dizziness/lightheadedness and call the triage line back or 911 depending on the severity.  Norma Curiel RN    Additional Information    Health Information question, no triage required and triager able to answer question    Negative: General information question, no triage required and triager able to answer question    Negative: [1] Caller is not with the adult (patient) AND [2] reporting urgent symptoms    Negative: Lab result questions    Negative: Medication questions    Negative: Caller can't be reached by phone    Negative: Caller has already spoken to PCP or another triager    Negative: RN needs further essential information from caller in order to complete triage    Negative: Requesting regular office appointment    Negative: [1] Caller requesting NON-URGENT health information AND [2] PCP's office is the best resource    Protocols used: INFORMATION ONLY CALL-A-       Statement Selected

## 2023-02-20 NOTE — MR AVS SNAPSHOT
After Visit Summary   9/28/2018    Sayda Valles    MRN: [id number]           Patient Information     Date Of Birth          1985        Visit Information        Provider Department      9/28/2018 9:00 AM Rah Piña DO [facility]        Today's Diagnoses     Pain of right lower extremity    -  1    Contusion of right tibia          Care Instructions    1. Pain of right lower extremity    2. Contusion of right tibia      No restrictions  Numbness can take time to resolve  Swelling is soft tissue and not related to any bony overgrowth    Follow up as needed.      Official Walk with a Doc Chapter  Join me downstairs in the lobby of the [facility] the 1st Saturday of every month at 1pm for a free health talk. Come, listen and walk at your own pace!              Follow-ups after your visit        Who to contact     If you have questions or need follow up information about today's clinic visit or your schedule please contact [facility] MEDIC[facility] directly at [phone].  Normal or non-critical lab and imaging results will be communicated to you by So1hart, letter or phone within 4 business days after the clinic has received the results. If you do not hear from us within 7 days, please contact the clinic through Future Ad Labst or phone. If you have a critical or abnormal lab result, we will notify you by phone as soon as possible.  Submit refill requests through picsell or call your pharmacy and they will forward the refill request to us. Please allow 3 business days for your refill to be completed.          Additional Information About Your Visit        So1hart Information     picsell gives you secure access to your electronic health record. If you see a primary care provider, you can also send messages to your care team and make appointments. If you have questions, please call your primary care clinic.  If you do not have a primary care provider,  "please call [phone] and they will assist you.        Care EveryWhere ID     This is your Care EveryWhere ID. This could be used by other organizations to access your [facility] medical records  [id number]        Your Vitals Were     Height BMI (Body Mass Index)                5' 5\" (1.651 m) 37.11 kg/m2           Blood Pressure from Last 3 Encounters:   09/28/18 120/78   09/19/18 104/76   08/28/18 124/86    Weight from Last 3 Encounters:   09/28/18 223 lb (101.2 kg)   09/19/18 223 lb (101.2 kg)   08/28/18 225 lb (102.1 kg)              Today, you had the following     No orders found for display       Primary Care Provider Office Phone # Fax #    Bertha Ravi PA-C [phone]       [address] [place] [id number]        Equal Access to Services     [facility] : Hadii sujatha saxena hadasho Sobryanna, waaxda luqadaha, qaybta kaalmada adeegyada, susan loomis . So [facility] [phone].    ATENCIÓN: Si habla español, tiene a anderson disposición servicios gratuitos de asistencia lingüística. Llame al [phone].    We comply with applicable federal civil rights laws and [place] laws. We do not discriminate on the basis of race, color, national origin, age, disability, sex, sexual orientation, or gender identity.            Thank you!     Thank you for choosing [facility] SP[facility]  for your care. Our goal is always to provide you with excellent care. Hearing back from our patients is one way we can continue to improve our services. Please take a few minutes to complete the written survey that you may receive in the mail after your visit with us. Thank you!             Your Updated Medication List - Protect others around you: Learn how to safely use, store and throw away your medicines at www.disposemymeds.org.          This list is accurate as of 9/28/18  9:23 AM.  Always use your most recent med list.                   Brand Name Dispense Instructions " for use Diagnosis    albuterol 108 (90 Base) MCG/ACT inhaler    PROAIR HFA/PROVENTIL HFA/VENTOLIN HFA    1 Inhaler    Inhale 1-2 puffs into the lungs every 4 hours as needed for shortness of breath / dyspnea or wheezing    Mild persistent asthma without complication       fluticasone 44 MCG/ACT Inhaler    FLOVENT HFA    1 Inhaler    Inhale 2 puffs into the lungs 2 times daily    Mild persistent asthma without complication       loratadine 10 MG tablet    CLARITIN     Take 10 mg by mouth daily           4 (moderate pain)

## 2023-03-19 ENCOUNTER — NURSE TRIAGE (OUTPATIENT)
Dept: NURSING | Facility: CLINIC | Age: 38
End: 2023-03-19
Payer: COMMERCIAL

## 2023-03-19 NOTE — TELEPHONE ENCOUNTER
General question. Has HTN, stroke four years ago. Wants to take Plan B pill. Wants to know if that's safe to do with her history.  I referred her to her pharmacist.  Daniela Miles RN  [facility]    Reason for Disposition    [1] Caller has medicine question about med NOT prescribed by PCP AND [2] triager unable to answer question (e.g., compatibility with other med, storage)    Additional Information    Negative: [1] Intentional drug overdose AND [2] suicidal thoughts or ideas    Negative: Drug overdose and triager unable to answer question    Negative: Caller requesting a renewal or refill of a medicine patient is currently taking    Negative: Caller requesting information unrelated to medicine    Negative: Caller requesting information about COVID-19 Vaccine    Negative: Caller requesting information about Emergency Contraception    Negative: Caller requesting information about Combined Birth Control Pills    Negative: Caller requesting information about Progestin Birth Control Pills    Negative: Caller requesting information about Post-Op pain or medicines    Negative: Caller requesting a prescription antibiotic (such as Penicillin) for Strep throat and has a positive culture result    Negative: Caller requesting a prescription anti-viral med (such as Tamiflu) and has influenza (flu) symptoms    Negative: Immunization reaction suspected    Negative: Rash while taking a medicine or within 3 days of stopping it    Negative: [1] Asthma and [2] having symptoms of asthma (cough, wheezing, etc.)    Negative: [1] Symptom of illness (e.g., headache, abdominal pain, earache, vomiting) AND [2] more than mild    Negative: Breastfeeding questions about mother's medicines and diet    Negative: MORE THAN A DOUBLE DOSE of a prescription or over-the-counter (OTC) drug    Negative: [1] DOUBLE DOSE (an extra dose or lesser amount) of prescription drug AND [2] any symptoms (e.g., dizziness, nausea, pain,  sleepiness)    Negative: [1] DOUBLE DOSE (an extra dose or lesser amount) of over-the-counter (OTC) drug AND [2] any symptoms (e.g., dizziness, nausea, pain, sleepiness)    Negative: Took another person's prescription drug    Negative: [1] DOUBLE DOSE (an extra dose or lesser amount) of prescription drug AND [2] NO symptoms (Exception: a double dose of antibiotics)    Negative: Diabetes drug error or overdose (e.g., took wrong type of insulin or took extra dose)    Negative: [1] Prescription not at pharmacy AND [2] was prescribed by PCP recently (Exception: triager has access to EMR and prescription is recorded there. Go to Home Care and confirm for pharmacy.)    Negative: [1] Pharmacy calling with prescription question AND [2] triager unable to answer question    Negative: [1] Caller has URGENT medicine question about med that PCP or specialist prescribed AND [2] triager unable to answer question    Negative: Medicine patch causing local rash or itching    Protocols used: MEDICATION QUESTION CALL-A-

## 2023-04-12 NOTE — TELEPHONE ENCOUNTER
1. Take Cefadroxil twice daily for 7 days.    2. Apply Mupirocin three times daily for 7 days.   3. Take Norco as needed for sciatica pain relief.  4. Go to the Emergency Department immediately if symptoms worsen or concerning new problems develop.   Patient called and advised she was recently seen in the hospital stroke believed to be secondary to PFO. Patient needing OV to discuss PFO closure. RN transferred patient to scheduling to arrange OV. Patient also following up with Neurology.

## 2023-04-15 ENCOUNTER — HEALTH MAINTENANCE LETTER (OUTPATIENT)
Age: 38
End: 2023-04-15

## 2023-05-24 ENCOUNTER — TELEPHONE (OUTPATIENT)
Dept: FAMILY MEDICINE | Facility: CLINIC | Age: 38
End: 2023-05-24
Payer: COMMERCIAL

## 2023-05-24 NOTE — TELEPHONE ENCOUNTER
Patient calls asking for guidance with pain medication.     She explains last Thursday she went to [facility] in [place] and  Mirana IUD was placed.     She said initially she had some cramping and did get period on Saturday  Today extreme lower abdominal cramping and into low back, making her feel nauseated      Taking Tylenol as directed, advised 4,000max a day   Takes daily asa due to stroke, asking if she can take ibuprofen.     Advised can take Tylenol and ibuprofen as directed - temporary and short term (has high blood pressure too)  advised can start per bottle, can take small dose once pain comes down    Can also try Ice or heat to lower abd or back 20 minutes 3-5 times a day    Advised if pain is not controlled with OTC medication would recommended UC for pain management.     Patient is waiting for a call back from [facility]   Patient asked if we remove IUD's  , yes, huddled with provider would advise follow up with placing physical and for eval-advised patient.     [facility] called patient back while on the phone- patient disconnected to talk through with placing providers staff.     Mary HUERTA RN   [facility] [place] [facility]

## 2023-06-14 NOTE — PROGRESS NOTES
SUBJECTIVE:   CC: Sayda Valles is an 34 year old woman who presents for preventive health visit.       Healthy Habits:     Getting at least 3 servings of Calcium per day:  NO    Bi-annual eye exam:  Yes    Dental care twice a year:  Yes    Sleep apnea or symptoms of sleep apnea:  Daytime drowsiness    Diet:  Regular (no restrictions)    Frequency of exercise:  1 day/week    Duration of exercise:  15-30 minutes    Taking medications regularly:  Yes    Medication side effects:  Not applicable    PHQ-2 Total Score: 0    Additional concerns today:  Yes      Social:  Doing well, 15 year old daughter is doing hybrid learning and softball.   Work is going well.      TCO follow-up:    Was seen in urgent care on 9/11/2020 for acute back pain.    Right mid thoracic back pain has improved, 50% improved.   Considering physical therapy.    Back adjustment with chiropractor, no neck adjustments.      HTN:    Doing well on Metoprolol XL 50 mg daily.    130's/80's at home.      Asthma:    Asthma has been well controlled.  No concerns.      ACT Total Scores 4/15/2019 10/1/2019 6/25/2020   ACT TOTAL SCORE - - -   ASTHMA ER VISITS - - -   ASTHMA HOSPITALIZATIONS - - -   ACT TOTAL SCORE (Goal Greater than or Equal to 20) 23 23 22   In the past 12 months, how many times did you visit the emergency room for your asthma without being admitted to the hospital? 0 0 0   In the past 12 months, how many times were you hospitalized overnight because of your asthma? 0 0 0       Today's PHQ-2 Score:   PHQ-2 ( 1999 Pfizer) 9/21/2020   Q1: Little interest or pleasure in doing things 0   Q2: Feeling down, depressed or hopeless 0   PHQ-2 Score 0   Q1: Little interest or pleasure in doing things Not at all   Q2: Feeling down, depressed or hopeless Not at all   PHQ-2 Score 0       Abuse: Current or Past (Physical, Sexual or Emotional) - No  Do you feel safe in your environment? No        Social History     Tobacco Use     Smoking status: Former  No care due was identified.  [facility] Embedded Care Due Messages. Reference number: [id number].   6/14/2023 10:34:05 AM CDT   Smoker     Packs/day: 0.50     Years: 2.00     Pack years: 1.00     Types: Cigarettes     Start date:      Last attempt to quit: 2001     Years since quittin.6     Smokeless tobacco: Never Used   Substance Use Topics     Alcohol use: Yes     Comment: 0-0.5 glasses of wine per month     If you drink alcohol do you typically have >3 drinks per day or >7 drinks per week? Not applicable    Alcohol Use 2020   Prescreen: >3 drinks/day or >7 drinks/week? -   Prescreen: >3 drinks/day or >7 drinks/week? Not Applicable       Reviewed orders with patient.  Reviewed health maintenance and updated orders accordingly - Yes  BP Readings from Last 3 Encounters:   20 (!) 120/90   09/15/20 124/82   20 126/74    Wt Readings from Last 3 Encounters:   20 107.2 kg (236 lb 4.8 oz)   09/15/20 107 kg (236 lb)   20 107.5 kg (237 lb)                  Patient Active Problem List   Diagnosis     Allergic rhinitis     Varicose Veins of Legs- painful with standing     Abnormal Mammogram- 2006 - prob benign      Heart palpitations - since  intermittent. Reports multiple work-ups with unclear cause. Followed by cardiology.     CARDIOVASCULAR SCREENING; LDL GOAL LESS THAN 160     Anxiety     Migraine with aura and without status migrainosus, not intractable - since ; stable.     Mild persistent asthma without complication     Bilateral ankle pain     Family history of breast cancer - paternal grandma, paternal aunt and paternal great-grandmother. Dad completed genetic screening which was neg.      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)     Borderline  Aortic root enlargement (H)     History of stroke     Brain aneurysm     Hypertension goal BP (blood pressure) < 140/90     Morbid obesity (H)     Past Surgical History:   Procedure Laterality Date     NO HISTORY OF SURGERY         Social History     Tobacco Use     Smoking status: Former Smoker     Packs/day: 0.50     Years: 2.00     Pack years: 1.00     Types: Cigarettes     Start date:      Last attempt to quit: 2001     Years since quittin.6     Smokeless tobacco: Never Used   Substance Use Topics     Alcohol use: Yes     Comment: 0-0.5 glasses of wine per month     Family History   Problem Relation Age of Onset     Lipids Father      Neurologic Disorder Father         epilepsy     Heart Disease Father 47        MI     Hypertension Father      Hyperlipidemia Father      Lipids Sister      Hyperlipidemia Sister      Breast Cancer Paternal Grandmother         3rd generation      Breast Cancer Paternal Aunt      Diabetes Mother         gestational      Asthma Mother      Diabetes Paternal Grandfather      Other Cancer Maternal Grandfather         Skin     Hyperlipidemia Paternal Half-Sister      Breast Cancer Other      Other Cancer Maternal Grandmother         Lung     Ovarian Cancer Maternal Grandmother      Lung Cancer Maternal Grandmother      Diabetes Maternal Uncle      Diabetes Maternal Uncle      Colon Cancer No family hx of          Current Outpatient Medications   Medication Sig Dispense Refill     acetaminophen (TYLENOL) 500 MG tablet Take 1,000 mg by mouth 2 times daily as needed        albuterol (PROAIR HFA/PROVENTIL HFA/VENTOLIN HFA) 108 (90 Base) MCG/ACT inhaler Inhale 1-2 puffs into the lungs every 4 hours as needed for shortness of breath / dyspnea or wheezing 1 Inhaler 1     aspirin (ASA) 81 MG EC tablet Take 1 tablet (81 mg) by mouth daily       Camphor-Menthol 0.2-3.5 % LIQD Externally apply 1 spray topically       cyclobenzaprine (FLEXERIL) 10 MG tablet Take 1 tablet (10 mg) by  mouth 2 times daily as needed for muscle spasms 10 tablet 0     loratadine (CLARITIN) 10 MG tablet Take 10 mg by mouth daily       metoprolol succinate ER (TOPROL-XL) 50 MG 24 hr tablet Take 1 tablet (50 mg) by mouth daily 90 tablet 3     Multiple Vitamins-Minerals (MULTIVITAMIN GUMMIES WOMENS) CHEW Smarty Pants Women's Complete       VITAMIN D PO Take 2,000 Int'l Units by mouth         Mammogram not appropriate for this patient based on age.    Pertinent mammograms are reviewed under the imaging tab.  History of abnormal Pap smear: NO - age 30-65 PAP every 5 years with negative HPV co-testing recommended  PAP / HPV Latest Ref Rng & Units 6/25/2020 3/11/2016 7/21/2014   PAP - NIL NIL NIL   HPV 16 DNA NEG:Negative Negative Negative -   HPV 18 DNA NEG:Negative Negative Negative -   OTHER HR HPV NEG:Negative Negative Negative -     Reviewed and updated as needed this visit by clinical staff  Tobacco  Allergies         Reviewed and updated as needed this visit by Provider        Past Medical History:   Diagnosis Date     Allergic rhinitis, cause unspecified     Allergic rhinitis     Aneurysm (H)     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 Provider to review and confirm.     Family history of malignant neoplasm of breast     4 generations on her father's side     FAMILY HX BREAST MALIG      Heart palpitations 2/10    PVC's - dr sandhu     Hypertension goal BP (blood pressure) < 140/90 9/15/2020     Migraine without aura, with intractable migraine, so stated, without mention of status migrainosus     sees neurologist     Mild persistent asthma      Non morbid obesity due to excess calories 5/9/2016     Simple goiter 11/25/2008    pt has no enlargement and had normal blood work-up at that time     Stroke (H) 06/15/2019    due to L vert artery dissection  "after chiropractor manipulation      Past Surgical History:   Procedure Laterality Date     NO HISTORY OF SURGERY         Review of Systems  CONSTITUTIONAL: NEGATIVE for fever, chills, change in weight  INTEGUMENTARU/SKIN: NEGATIVE for worrisome rashes, moles or lesions  EYES: NEGATIVE for vision changes or irritation  ENT: NEGATIVE for ear, mouth and throat problems  RESP: NEGATIVE for significant cough or SOB  BREAST: NEGATIVE for masses, tenderness or discharge  CV: NEGATIVE for chest pain, palpitations or peripheral edema  GI: NEGATIVE for nausea, abdominal pain, heartburn, or change in bowel habits  : NEGATIVE for unusual urinary or vaginal symptoms. Periods are regular.  MUSCULOSKELETAL: POSITIVE for right thoracic pain  NEURO: NEGATIVE for weakness, dizziness or paresthesias  PSYCHIATRIC: NEGATIVE for changes in mood or affect     OBJECTIVE:   BP (!) 120/90   Pulse 87   Temp 96.9  F (36.1  C) (Oral)   Ht 1.651 m (5' 5\")   Wt 107.2 kg (236 lb 4.8 oz)   SpO2 97%   BMI 39.32 kg/m    Physical Exam  GENERAL: healthy, alert and no distress  EYES: Eyes grossly normal to inspection, PERRL and conjunctivae and sclerae normal  HENT: ear canals and TM's normal, nose and mouth without ulcers or lesions  NECK: no adenopathy, no asymmetry, masses, or scars and thyroid normal to palpation  RESP: lungs clear to auscultation - no rales, rhonchi or wheezes  CV: regular rate and rhythm, normal S1 S2, no S3 or S4, no murmur, click or rub, no peripheral edema  ABDOMEN: soft, nontender, no hepatosplenomegaly, no masses and bowel sounds normal  MS: no gross musculoskeletal defects noted, no edema, right thoracic/rib cage region is non-tender to palpation  SKIN: no suspicious lesions or rashes  NEURO: Normal strength and tone, mentation intact and speech normal  PSYCH: mentation appears normal, affect normal/bright      ASSESSMENT/PLAN:     Sayda was seen today for physical.    Diagnoses and all orders for this " "visit:    Routine history and physical examination of adult    Benign essential hypertension  Patient doing well on Toprol-XL 50 mg daily.    -     REVIEW OF HEALTH MAINTENANCE PROTOCOL ORDERS  -     metoprolol succinate ER (TOPROL-XL) 50 MG 24 hr tablet; Take 1 tablet (50 mg) by mouth daily  -     **Comprehensive metabolic panel FUTURE anytime; Future    Mild persistent asthma without complication  ACT Total Scores 4/15/2019 10/1/2019 6/25/2020   ACT TOTAL SCORE - - -   ASTHMA ER VISITS - - -   ASTHMA HOSPITALIZATIONS - - -   ACT TOTAL SCORE (Goal Greater than or Equal to 20) 23 23 22   In the past 12 months, how many times did you visit the emergency room for your asthma without being admitted to the hospital? 0 0 0   In the past 12 months, how many times were you hospitalized overnight because of your asthma? 0 0 0   Stable asthma control.  Albuterol inhaler refilled.      -     albuterol (PROAIR HFA/PROVENTIL HFA/VENTOLIN HFA) 108 (90 Base) MCG/ACT inhaler; Inhale 1-2 puffs into the lungs every 4 hours as needed for shortness of breath / dyspnea or wheezing    Thyroid nodule  FNA completed in January 2020, benign biopsy.    -     **TSH with free T4 reflex FUTURE anytime; Future    Screening for diabetes mellitus  - Glucose in CMP    Screening for lipid disorders  -     Lipid panel reflex to direct LDL Fasting; Future        COUNSELING:  Reviewed preventive health counseling, as reflected in patient instructions       Regular exercise       Healthy diet/nutrition    Estimated body mass index is 39.32 kg/m  as calculated from the following:    Height as of this encounter: 1.651 m (5' 5\").    Weight as of this encounter: 107.2 kg (236 lb 4.8 oz).    Weight management plan: Discussed healthy diet and exercise guidelines    She reports that she quit smoking about 19 years ago. Her smoking use included cigarettes. She started smoking about 21 years ago. She has a 1.00 pack-year smoking history. She has never used " smokeless tobacco.      Counseling Resources:  ATP IV Guidelines  Pooled Cohorts Equation Calculator  Breast Cancer Risk Calculator  BRCA-Related Cancer Risk Assessment: FHS-7 Tool  FRAX Risk Assessment  ICSI Preventive Guidelines  Dietary Guidelines for Americans, 2010  USDA's MyPlate  ASA Prophylaxis  Lung CA Screening    Brittany Miles, CHE [place]T  [facility]

## 2023-06-27 ENCOUNTER — MYC MEDICAL ADVICE (OUTPATIENT)
Dept: FAMILY MEDICINE | Facility: CLINIC | Age: 38
End: 2023-06-27
Payer: COMMERCIAL

## 2023-06-27 NOTE — TELEPHONE ENCOUNTER
Please advise if you want to fit pt in or ok to wait until next available appt which is Calin DIAZ RN, BSN

## 2023-06-28 NOTE — TELEPHONE ENCOUNTER
Ok to use next day/virtual slot appointment (early afternoon, not last appt of the day) for patient.     - [place],CNP

## 2023-06-29 ENCOUNTER — OFFICE VISIT (OUTPATIENT)
Dept: FAMILY MEDICINE | Facility: CLINIC | Age: 38
End: 2023-06-29
Payer: COMMERCIAL

## 2023-06-29 VITALS
DIASTOLIC BLOOD PRESSURE: 82 MMHG | TEMPERATURE: 98.2 F | RESPIRATION RATE: 16 BRPM | WEIGHT: 232 LBS | HEART RATE: 93 BPM | BODY MASS INDEX: 38.65 KG/M2 | OXYGEN SATURATION: 98 % | SYSTOLIC BLOOD PRESSURE: 110 MMHG | HEIGHT: 65 IN

## 2023-06-29 DIAGNOSIS — E04.1 THYROID NODULE: ICD-10-CM

## 2023-06-29 DIAGNOSIS — R68.2 ORAL DRYNESS: ICD-10-CM

## 2023-06-29 DIAGNOSIS — R49.0 HOARSENESS: Primary | ICD-10-CM

## 2023-06-29 PROCEDURE — 99214 OFFICE O/P EST MOD 30 MIN: CPT | Performed by: NURSE PRACTITIONER

## 2023-06-29 PROCEDURE — 87102 FUNGUS ISOLATION CULTURE: CPT | Performed by: NURSE PRACTITIONER

## 2023-06-29 ASSESSMENT — ASTHMA QUESTIONNAIRES
QUESTION_2 LAST FOUR WEEKS HOW OFTEN HAVE YOU HAD SHORTNESS OF BREATH: ONCE OR TWICE A WEEK
ACT_TOTALSCORE: 22
QUESTION_5 LAST FOUR WEEKS HOW WOULD YOU RATE YOUR ASTHMA CONTROL: WELL CONTROLLED
QUESTION_4 LAST FOUR WEEKS HOW OFTEN HAVE YOU USED YOUR RESCUE INHALER OR NEBULIZER MEDICATION (SUCH AS ALBUTEROL): NOT AT ALL
QUESTION_3 LAST FOUR WEEKS HOW OFTEN DID YOUR ASTHMA SYMPTOMS (WHEEZING, COUGHING, SHORTNESS OF BREATH, CHEST TIGHTNESS OR PAIN) WAKE YOU UP AT NIGHT OR EARLIER THAN USUAL IN THE MORNING: NOT AT ALL
QUESTION_1 LAST FOUR WEEKS HOW MUCH OF THE TIME DID YOUR ASTHMA KEEP YOU FROM GETTING AS MUCH DONE AT WORK, SCHOOL OR AT HOME: A LITTLE OF THE TIME
ACT_TOTALSCORE: 22

## 2023-06-29 NOTE — PROGRESS NOTES
"  Assessment & Plan   Sayda was seen today for throat problem.    Diagnoses and all orders for this visit:    Hoarseness  3 month history of hoarseness.   Recommend trial of starting Flonase and consistent use of daily antihistamine.   Additionally, plan further consultation with ENT.    -     Adult ENT  Referral; Future    Oral concern, sandpaper sensation  Rule out fungal/thrush infection.   -     Yeast culture    Thyroid nodule  Last thyroid ultrasound completed 1/13/2020.   Thyroid nodules as follows:  1. 0.6 x 0.5 x 0.3 cm complex cystic nodule right lower thyroid.  2. 2.2 x 2.1 x 1.3 cm hypoechoic heterogeneous solid left lower  thyroid nodule. Microcalcifications noted. Peripheral  Hypervascularity.  Subsequent FNA completed, revealed benign findings  Plan repeat thyroid ultrasound for surveillance.    -     US Thyroid; Future        BMI:   Estimated body mass index is 38.31 kg/m  as calculated from the following:    Height as of this encounter: 1.657 m (5' 5.25\").    Weight as of this encounter: 105.2 kg (232 lb).     Return in about 2 weeks (around 7/13/2023) for consultation with ENT.    Brittany Miles, CHE CNP  [facility]    [facility]   Sayda is a 37 year old, presenting for the following health issues:  Throat Problem         No data to display              HPI     Hi there, I had a cold about a month and a half ago and ever since then my voice has not been the same. When I m talking, it sounds like I m losing my voice. It s raspy and just doesn t sound normal. For about the same amount of time I ve also noticed the inside of my cheeks feel like sandpaper. I don t see any marks on my cheeks they just feel strange. Some days are worse than others, and I cannot figure out what is causing that. I called to make an appointment but you didn t have anything until August. I m wondering if you can get me in any sooner? I did have some recent bloodwork done through my OB/GYN, " "and to my understanding all of that was normal.    Patient reports that voice is raspy - intermittent, but hasn't been normal since end of March.    Family and friends have noticed this as well.  Is frustrated with voice concerns.    No URI symptoms.   Takes Claritin on a consistent basis.      Weird feeling inside of cheeks - rough feeling, feels like sandpaper slightly.      Was taking Zinc every day for months - stopped that 3 weeks ago.  This feeling has slightly improved.        Review of Systems   Constitutional, HEENT, cardiovascular, pulmonary, gi and gu systems are negative, except as otherwise noted.      Objective    /82   Pulse 93   Temp 98.2  F (36.8  C) (Tympanic)   Resp 16   Ht 1.657 m (5' 5.25\")   Wt 105.2 kg (232 lb)   LMP  (LMP Unknown)   SpO2 98%   BMI 38.31 kg/m    Body mass index is 38.31 kg/m .  Physical Exam     GENERAL: healthy, alert and no distress  EYES: Eyes grossly normal to inspection  HENT: ear canals and TM's normal, nose and mouth without ulcers or lesions  NECK: no adenopathy, no asymmetry, masses, or scars and thyroid normal to palpation  RESP: lungs clear to auscultation - no rales, rhonchi or wheezes  CV: regular rate and rhythm, normal S1 S2, no S3 or S4, no murmur, click or rub, no peripheral edema  PSYCH: mentation appears normal, affect normal/bright            "

## 2023-06-30 ENCOUNTER — ANCILLARY PROCEDURE (OUTPATIENT)
Dept: ULTRASOUND IMAGING | Facility: CLINIC | Age: 38
End: 2023-06-30
Attending: NURSE PRACTITIONER
Payer: COMMERCIAL

## 2023-06-30 DIAGNOSIS — E04.1 THYROID NODULE: ICD-10-CM

## 2023-06-30 PROCEDURE — 76536 US EXAM OF HEAD AND NECK: CPT

## 2023-07-03 ENCOUNTER — TELEPHONE (OUTPATIENT)
Dept: FAMILY MEDICINE | Facility: CLINIC | Age: 38
End: 2023-07-03
Payer: COMMERCIAL

## 2023-07-03 LAB — BACTERIA SPEC CULT: NO GROWTH

## 2023-07-03 NOTE — TELEPHONE ENCOUNTER
Patient is calling about 6/30/23 thyroid US results.   The patient is asking for recommendations.   Advised  provider is back in office Wednesday    Mary HUERTA RN   [facility] Triage

## 2023-07-05 ENCOUNTER — TELEPHONE (OUTPATIENT)
Dept: ENDOCRINOLOGY | Facility: CLINIC | Age: 38
End: 2023-07-05
Payer: COMMERCIAL

## 2023-07-05 DIAGNOSIS — E04.1 THYROID NODULE: Primary | ICD-10-CM

## 2023-07-05 NOTE — TELEPHONE ENCOUNTER
[facility] Call Center    Phone Message    May a detailed message be left on voicemail: yes     Reason for Call: Appointment Intake    Referring Provider Name: Brittany Miles APRN CNP   Diagnosis and/or Symptoms: Thyroid Nodules    Action Taken: Other: endo    Travel Screening: Not Applicable

## 2023-07-05 NOTE — RESULT ENCOUNTER NOTE
Called patient and reviewed results.   Endocrinology referral placed for following of thyroid nodules.   Consider FNA, pt will have ENT consultation first - scheduled for 7/7/23.     Brittany Miles, APRN, CNP  [facility]

## 2023-07-05 NOTE — RESULT ENCOUNTER NOTE
Dear Sayda,     No fungal growth noted on your culture.        Thank you,     CHE Rueda, CNP  [facility]

## 2023-07-07 ENCOUNTER — TRANSFERRED RECORDS (OUTPATIENT)
Dept: HEALTH INFORMATION MANAGEMENT | Facility: CLINIC | Age: 38
End: 2023-07-07
Payer: COMMERCIAL

## 2023-07-10 ENCOUNTER — MYC MEDICAL ADVICE (OUTPATIENT)
Dept: FAMILY MEDICINE | Facility: CLINIC | Age: 38
End: 2023-07-10
Payer: COMMERCIAL

## 2023-07-10 DIAGNOSIS — R49.0 HOARSENESS: Primary | ICD-10-CM

## 2023-07-11 NOTE — TELEPHONE ENCOUNTER
LOV 6/29/2023      Please see my chart message below     Please review and advise     Thank you     Jamaica Woodruff RN, BSN  [place] Triage

## 2023-07-13 NOTE — TELEPHONE ENCOUNTER
Endocrine triage  The scheduled first available endocrine appointment timeframe is acceptable..  Barbara Canseco MD

## 2023-08-14 DIAGNOSIS — I10 BENIGN ESSENTIAL HYPERTENSION: ICD-10-CM

## 2023-08-15 RX ORDER — METOPROLOL SUCCINATE 50 MG/1
50 TABLET, EXTENDED RELEASE ORAL DAILY
Qty: 90 TABLET | Refills: 0 | Status: SHIPPED | OUTPATIENT
Start: 2023-08-15 | End: 2023-11-10

## 2023-09-28 NOTE — TELEPHONE ENCOUNTER
FUTURE VISIT INFORMATION:      FUTURE VISIT INFORMATION:  Date: 12/7/23  Time: 9 AM  [place] [facility]  REFERRAL INFORMATION:  Referring provider:   Referring providers clinic:   Reason for visit/diagnosis:  hoarseness of the voice.     RECORDS REQUESTED FROM:       Clinic name Comments Records Status Imaging Status   [facility] 7/10/23 mychart advice/ referral from Brittany Miles APRN CNP     6/29/23 office visit [facility]    Primary ENT 7/7/23 note from Musa Feldman MD Scanned in [facility] Imaging US thyroid 6/30/23, 1/13/20  CTA head neck 6/15/19  MR/A brain 6/15/19  XR chest 3/8/19  *more images in pacs [facility] PACS   UTD medical imaging CTA chest abd pel 12/17/20  CTA head neck 11/16/20 [facility] Pending req 11/9/23  PACS   Procedure 12/1/21 Upper GI [facility]       November 9, 2023 7:46 AM - request images -Kavya  November 14, 2023 3:37 PM - image resolved in pacs -Kavya

## 2023-10-04 ENCOUNTER — OFFICE VISIT (OUTPATIENT)
Dept: FAMILY MEDICINE | Facility: CLINIC | Age: 38
End: 2023-10-04
Payer: COMMERCIAL

## 2023-10-04 ENCOUNTER — NURSE TRIAGE (OUTPATIENT)
Dept: FAMILY MEDICINE | Facility: CLINIC | Age: 38
End: 2023-10-04

## 2023-10-04 VITALS
DIASTOLIC BLOOD PRESSURE: 94 MMHG | HEIGHT: 65 IN | SYSTOLIC BLOOD PRESSURE: 133 MMHG | WEIGHT: 236.1 LBS | OXYGEN SATURATION: 98 % | BODY MASS INDEX: 39.34 KG/M2 | TEMPERATURE: 97.8 F | HEART RATE: 76 BPM | RESPIRATION RATE: 16 BRPM

## 2023-10-04 DIAGNOSIS — I77.89 AORTIC ROOT ENLARGEMENT (H): ICD-10-CM

## 2023-10-04 DIAGNOSIS — R07.9 CHEST PAIN, UNSPECIFIED TYPE: Primary | ICD-10-CM

## 2023-10-04 DIAGNOSIS — Q21.12 PATENT FORAMEN OVALE: ICD-10-CM

## 2023-10-04 LAB — TROPONIN T SERPL HS-MCNC: <6 NG/L

## 2023-10-04 PROCEDURE — 36415 COLL VENOUS BLD VENIPUNCTURE: CPT | Performed by: PHYSICIAN ASSISTANT

## 2023-10-04 PROCEDURE — 84484 ASSAY OF TROPONIN QUANT: CPT | Performed by: PHYSICIAN ASSISTANT

## 2023-10-04 PROCEDURE — 99214 OFFICE O/P EST MOD 30 MIN: CPT | Mod: 25 | Performed by: PHYSICIAN ASSISTANT

## 2023-10-04 PROCEDURE — 93000 ELECTROCARDIOGRAM COMPLETE: CPT | Performed by: PHYSICIAN ASSISTANT

## 2023-10-04 RX ORDER — ZINC GLUCONATE 50 MG
50 TABLET ORAL PRN
COMMUNITY

## 2023-10-04 ASSESSMENT — PAIN SCALES - GENERAL: PAINLEVEL: MILD PAIN (3)

## 2023-10-04 NOTE — PROGRESS NOTES
Helio Alfredo is a 37 year old, presenting for the following health issues:  Chest Pain    Started to have pain after pulling up some floor with a hammer 2 days ago  Then last night she was leaning on the countertop looking at her phone for about 10min and when she stood up the pain was severe and has persisted  Currently the pain in the sternum, constant but coming waves on intensity  Denies palpit now, but does have that history  Denies assoc sob or dizziness or lightheadedness  She tried 2 tums which didn't help  She denies any heartburn symptoms  She did have a soda prior to onset of pain so thought that could have caused this  She had some anxiety the past 2 days worrying about her only dtr and also nervous about seeing the dentist.  No longer feeling anxious since made it through her dental appt  She has her period so has been taking tylenol but that not alleviating her pain.  No assoc cough, cold or fever    History of Present Illness       Reason for visit:  Chest pain  Symptom onset:  1-3 days ago  Symptoms include:  Mild constant chest pain near sternum  Symptom intensity:  Mild  Symptom progression:  Staying the same  Had these symptoms before:  Yes  Has tried/received treatment for these symptoms:  Yes  Previous treatment was successful:  Yes  Prior treatment description:  Unsure  What makes it worse:  No  What makes it better:  No    She eats 2-3 servings of fruits and vegetables daily.She consumes 2 sweetened beverage(s) daily.She exercises with enough effort to increase her heart rate 30 to 60 minutes per day.  She exercises with enough effort to increase her heart rate 3 or less days per week.   She is taking medications regularly.     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 Provider to review and confirm.    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     Past Surgical History:   Procedure Laterality Date    COLONOSCOPY N/A 2021    Procedure: Unable to complete EGD; COLONOSCOPY;  Surgeon: Gume Douglas MD;  Location:  GI    ESOPHAGOSCOPY, GASTROSCOPY, DUODENOSCOPY (EGD), COMBINED N/A 2021    Procedure: ESOPHAGOGASTRODUODENOSCOPY (EGD) ATTEMPTED BUT UNABLE TO COMPLETE, needs deeper sedation; patient too gaggy;  Surgeon: Gume Douglas MD;  Location:  GI    NO HISTORY OF SURGERY       Social History     Tobacco Use    Smoking status: Former     Packs/day: 0.50     Years: 2.00     Pack years: 1.00     Types: Cigarettes     Start date:      Quit date: 2001     Years since quittin.6    Smokeless tobacco: Never   Substance Use Topics    Alcohol use: Yes     Comment: 1 glass of wine few times per month     Current Outpatient Medications   Medication Sig Dispense Refill    acetaminophen (TYLENOL) 500 MG tablet Take 1,000 mg by mouth 2 times daily as needed       acetaminophen (TYLENOL) 650 MG CR tablet Take 1,300 mg by mouth as needed      albuterol (PROAIR HFA/PROVENTIL HFA/VENTOLIN HFA) 108 (90 Base) MCG/ACT inhaler Inhale 1-2 puffs into the lungs every 4 hours as needed for shortness of breath / dyspnea or wheezing 1 Inhaler 1     "aspirin (ASA) 81 MG EC tablet Take 1 tablet (81 mg) by mouth daily      cyclobenzaprine (FLEXERIL) 10 MG tablet Take 1 tablet (10 mg) by mouth 2 times daily as needed for muscle spasms 10 tablet 0    famotidine (PEPCID) 20 MG tablet Take 20 mg by mouth daily      loratadine (CLARITIN) 10 MG tablet Take 10 mg by mouth daily      metoprolol succinate ER (TOPROL XL) 50 MG 24 hr tablet TAKE 1 TABLET (50 MG) BY MOUTH DAILY 90 tablet 0    Multiple Vitamins-Minerals (MULTIVITAMIN GUMMIES WOMENS) CHEW Smarty Pants Women's Complete      PROBIOTIC PRODUCT PO Take 1 tablet by mouth daily      VITAMIN D PO Take 2,000 Int'l Units by mouth      zinc gluconate 50 MG tablet Take 50 mg by mouth as needed       Allergies   Allergen Reactions    Zithromax [Azithromycin Dihydrate] GI Disturbance    Azithromycin Nausea     FAMILY HISTORY NOTED AND REVIEWED    PHYSICAL EXAM:    BP (!) 133/94 (BP Location: Right arm, Patient Position: Sitting, Cuff Size: Adult Large)   Pulse 76   Temp 97.8  F (36.6  C) (Tympanic)   Resp 16   Ht 1.651 m (5' 5\")   Wt 107.1 kg (236 lb 1.6 oz)   LMP 09/28/2023 (Exact Date)   SpO2 98%   BMI 39.29 kg/m      Patient appears non toxic  Lungs: CTA bilat  Heart: RRR without skips, minimal sternal tenderness on palpation  Extr: no edema    EKG: NSR    Assessment and Plan:     (R07.9) Chest pain, unspecified type  (primary encounter diagnosis)  Comment: suspect msk, recd ice and tylenol and salon pas  Plan: EKG 12-lead complete w/read - Clinics,         Echocardiogram Complete, Troponin T, High         Sensitivity            (Q21.12) Patent foramen ovale  Comment:   Plan: Echocardiogram Complete            (I77.89) Borderline Aortic root enlargement (H)  Comment: recd she schedule her follow up appt with cardiology since it has been a few years  Plan: Echocardiogram Complete              She Yates PA-C          "

## 2023-10-04 NOTE — TELEPHONE ENCOUNTER
Nurse Triage SBAR    Is this a 2nd Level Triage? YES, LICENSED PRACTITIONER REVIEW IS REQUIRED    Situation: Patient is having sternal pain that doesn't radiate anywhere else. The pain does not get worse with exertion. She was hammering the floor in her house two days ago. Doesn't know if the pain is related to this. States the pain started at 10:30 pm last night. Patient states she does not think cardiac in origin but wants checked out.      Background: had stroke in 2019 due to correction by chiropractor on neck. Has aneurism behind eye and does have a cardiologist but has not followed up in awhile.     Assessment:  Patient needs to be seen in office today    Protocol Recommended Disposition:   See in Office Today    Recommendation: see today     Routed to provider    Does the patient meet one of the following criteria for ADS visit consideration? No      Reason for Disposition   Patient wants to be seen    Additional Information   Negative: Followed an injury to chest   Negative: SEVERE chest pain   Negative: Pain also in shoulder(s) or arm(s) or jaw   Negative: Difficulty breathing   Negative: Cocaine use within last 3 days   Negative: Major surgery in the past month   Negative: Hip or leg fracture (broken bone) in past month (or had cast on leg or ankle in past month)   Negative: Illness requiring prolonged bedrest in past month (e.g., immobilization, long hospital stay)   Negative: Long-distance travel in past month (e.g., car, bus, train, plane; with trip lasting 6 or more hours)   Negative: History of prior 'blood clot' in leg or lungs (i.e., deep vein thrombosis, pulmonary embolism)   Negative: History of inherited increased risk of blood clots (e.g., Factor 5 Leiden, Anti-thrombin 3, Protein C or Protein S deficiency, Prothrombin mutation)   Negative: Cancer treatment in the past two months (or has cancer now)   Negative: Heart beating irregularly or very rapidly   Negative: Chest pain lasting longer than  "5 minutes and occurred in last 3 days (72 hours) (Exception: Feels exactly the same as previously diagnosed heartburn and has accompanying sour taste in mouth.)   Negative: Chest pain or 'angina' comes and goes and is happening more often (increasing in frequency) or getting worse (increasing in severity) (Exception: Chest pains that last only a few seconds.)   Negative: Dizziness or lightheadedness   Negative: Coughing up blood   Negative: Patient sounds very sick or weak to the triager   Negative: Patient says chest pain feels exactly the same as previously diagnosed 'heartburn' and describes burning in chest and accompanying sour taste in mouth   Negative: All other patients with chest pain (Exception: Fleeting chest pain lasting a few seconds.)   Negative: Fever > 100.4 F (38.0 C)   Negative: Chest pain(s) lasting a few seconds persists > 3 days   Negative: Rash in same area as pain (may be described as 'small blisters')    Answer Assessment - Initial Assessment Questions  1. LOCATION: \"Where does it hurt?\"        In the center of the chest, hurting after doing housework.   2. RADIATION: \"Does the pain go anywhere else?\" (e.g., into neck, jaw, arms, back)      No right in the middle   3. ONSET: \"When did the chest pain begin?\" (Minutes, hours or days)       Last night at 10:30   4. PATTERN: \"Does the pain come and go, or has it been constant since it started?\"  \"Does it get worse with exertion?\"       Constant   5. DURATION: \"How long does it last\" (e.g., seconds, minutes, hours)      Every other second   6. SEVERITY: \"How bad is the pain?\"  (e.g., Scale 1-10; mild, moderate, or severe)     - MILD (1-3): doesn't interfere with normal activities      - MODERATE (4-7): interferes with normal activities or awakens from sleep     - SEVERE (8-10): excruciating pain, unable to do any normal activities        2/10  7. CARDIAC RISK FACTORS: \"Do you have any history of heart problems or risk factors for heart disease?\" " "(e.g., angina, prior heart attack; diabetes, high blood pressure, high cholesterol, smoker, or strong family history of heart disease)      None- patient has high blood pressure, stroke in 2019, vertebral artery dissection   Found brain aneurysm behind left eye   Heart palpitations and PSO found during stroke, valve enlarged   8. PULMONARY RISK FACTORS: \"Do you have any history of lung disease?\"  (e.g., blood clots in lung, asthma, emphysema, birth control pills)      Asthma   9. CAUSE: \"What do you think is causing the chest pain?\"      Patient having anxiety- had to go to dentist  10. OTHER SYMPTOMS: \"Do you have any other symptoms?\" (e.g., dizziness, nausea, vomiting, sweating, fever, difficulty breathing, cough)        None   11. PREGNANCY: \"Is there any chance you are pregnant?\" \"When was your last menstrual period?\"        Nope    Protocols used: Chest Pain-A-[place]    "

## 2023-11-07 ENCOUNTER — ANCILLARY PROCEDURE (OUTPATIENT)
Dept: GENERAL RADIOLOGY | Facility: CLINIC | Age: 38
End: 2023-11-07
Attending: PHYSICIAN ASSISTANT
Payer: COMMERCIAL

## 2023-11-07 ENCOUNTER — HOSPITAL ENCOUNTER (OUTPATIENT)
Dept: CARDIOLOGY | Facility: CLINIC | Age: 38
Discharge: HOME | End: 2023-11-07
Attending: PHYSICIAN ASSISTANT | Admitting: PHYSICIAN ASSISTANT
Payer: COMMERCIAL

## 2023-11-07 ENCOUNTER — NURSE TRIAGE (OUTPATIENT)
Dept: NURSING | Facility: CLINIC | Age: 38
End: 2023-11-07
Payer: COMMERCIAL

## 2023-11-07 ENCOUNTER — OFFICE VISIT (OUTPATIENT)
Dept: URGENT CARE | Facility: URGENT CARE | Age: 38
End: 2023-11-07
Payer: COMMERCIAL

## 2023-11-07 VITALS
OXYGEN SATURATION: 98 % | DIASTOLIC BLOOD PRESSURE: 87 MMHG | HEART RATE: 86 BPM | WEIGHT: 235 LBS | RESPIRATION RATE: 17 BRPM | TEMPERATURE: 98.8 F | SYSTOLIC BLOOD PRESSURE: 135 MMHG | BODY MASS INDEX: 39.11 KG/M2

## 2023-11-07 DIAGNOSIS — R07.9 CHEST PAIN, UNSPECIFIED TYPE: ICD-10-CM

## 2023-11-07 DIAGNOSIS — I77.89 AORTIC ROOT ENLARGEMENT (H): ICD-10-CM

## 2023-11-07 DIAGNOSIS — R09.89 CHEST CRACKLES: ICD-10-CM

## 2023-11-07 DIAGNOSIS — Q21.12 PATENT FORAMEN OVALE: ICD-10-CM

## 2023-11-07 DIAGNOSIS — J20.9 ACUTE BRONCHITIS, UNSPECIFIED ORGANISM: Primary | ICD-10-CM

## 2023-11-07 DIAGNOSIS — R05.1 ACUTE COUGH: ICD-10-CM

## 2023-11-07 LAB
LVEF ECHO: NORMAL
SARS-COV-2 RNA RESP QL NAA+PROBE: NEGATIVE

## 2023-11-07 PROCEDURE — 71046 X-RAY EXAM CHEST 2 VIEWS: CPT | Mod: TC | Performed by: RADIOLOGY

## 2023-11-07 PROCEDURE — 99214 OFFICE O/P EST MOD 30 MIN: CPT

## 2023-11-07 PROCEDURE — 255N000002 HC RX 255 OP 636: Performed by: PHYSICIAN ASSISTANT

## 2023-11-07 PROCEDURE — 999N000208 ECHOCARDIOGRAM COMPLETE

## 2023-11-07 PROCEDURE — 87635 SARS-COV-2 COVID-19 AMP PRB: CPT

## 2023-11-07 PROCEDURE — 93306 TTE W/DOPPLER COMPLETE: CPT | Mod: 26 | Performed by: INTERNAL MEDICINE

## 2023-11-07 RX ORDER — ALBUTEROL SULFATE 90 UG/1
2 AEROSOL, METERED RESPIRATORY (INHALATION) EVERY 6 HOURS PRN
Qty: 18 G | Refills: 0 | Status: SHIPPED | OUTPATIENT
Start: 2023-11-07 | End: 2023-11-13

## 2023-11-07 RX ORDER — PREDNISONE 20 MG/1
40 TABLET ORAL DAILY
Qty: 10 TABLET | Refills: 0 | Status: SHIPPED | OUTPATIENT
Start: 2023-11-07 | End: 2023-11-13

## 2023-11-07 RX ADMIN — HUMAN ALBUMIN MICROSPHERES AND PERFLUTREN 9 ML: 10; .22 INJECTION, SOLUTION INTRAVENOUS at 08:53

## 2023-11-07 NOTE — RESULT ENCOUNTER NOTE
Sayda,    Your echocardiogram result reads as follows:  Interpretation Summary    The visual ejection fraction is 60-65%.  The right ventricle is normal in structure, function and size.  The left atrium is mildly dilated.  There is mild (1+) tricuspid regurgitation.    Please follow up with the cardiologist as we discussed at your office visit.    She Yates PA-C     Statement Selected

## 2023-11-07 NOTE — TELEPHONE ENCOUNTER
Patient is calling to make an appointment.  Patient has been having mild chest congestion and now is noticing crackling when breathing and cough is present and when coughing hurts the right side of breast up to neck.  Rattling and wheezing is also present.  Denies fever.   Patient has an albuterol inhaler.   Patient does not feel it is asthma related and patient is coughing up yellow sputum.        Reason for Disposition   Coughing up katherine-colored or yellow-green sputum    Additional Information   Negative: SEVERE asthma attack (e.g., struggling for each breath, speaks in single words, loud wheezes, pulse >120)  (RED  Zone)   Negative: Bluish (or gray) lips or face now   Negative: Difficult to awaken or acting confused (e.g., disoriented, slurred speech)   Negative: Passed out (i.e., lost consciousness, collapsed and was not responding)   Negative: Wheezing started suddenly after medicine, an allergic food, or bee sting   Negative: Sounds like a life-threatening emergency to the triager   Negative: [1] MODERATE asthma attack (e.g., SOB at rest, speaks in phrases, audible wheezes) AND [2] doesn't have neb or inhaler available   Negative: Peak flow rate less than 50% of baseline level  (RED  Zone)   Negative: Oxygen level (e.g., pulse oximetry) 90 percent or lower   Negative: [1] Hospitalized before with asthma AND [2] feels the same now   Negative: Chest pain  (Exception: Mild chest tightness that feels the same as prior asthma attacks.)   Negative: [1] MODERATE asthma attack (e.g., SOB at rest, speaks in phrases, audible wheezes) AND [2] not resolved after 2 or 3 inhaler or nebulizer treatments given 20 minutes apart   Negative: [1] Peak flow rate 50-79% of baseline level (YELLOW zone) AND [2] still present after 2 or 3 inhaler or nebulizer treatments given 20 minutes apart   Negative: Quick-relief asthma medicine (e.g., albuterol /salbutamol, levalbuterol by inhaler or nebulizer) is needed more frequently than  every 4 hours to keep you comfortable   Negative: Fever > 103 F (39.4 C)   Negative: Patient sounds very sick or weak to the triager   Negative: [1] Continuous (nonstop) coughing AND [2] keeps from working or sleeping AND [3] not improved after 2 or 3 inhaler or nebulizer treatments given 20 minutes apart   Negative: [1] Fever > 101 F (38.3 C) AND [2] age > 60 years   Negative: [1] Wheezing or coughing AND [2] hasn't used neb or inhaler (up to 3 treatments given 20 minutes apart) AND [3] it's available   Negative: [1] COVID-19 diagnosed or suspected (e.g., COVID-19 present in community, known COVID-19 exposure, positive test) AND [2] COVID symptoms (e.g., cough, fever)   Negative: [1] Influenza diagnosed or suspected (e.g., flu present in the community, known flu exposure) AND [2] FLU symptoms (e.g., cough with fever)   Negative: Oxygen level (e.g., pulse oximetry) 91 to 94 percent   Negative: [1] MILD asthma attack (e.g., no SOB at rest, mild SOB with walking, speaks normally in sentences, mild wheezing) AND [2] lasting > 24 hours on prescribed treatment   Negative: Fever present > 3 days (72 hours)    Protocols used: Asthma Attack-A-AH

## 2023-11-07 NOTE — PROGRESS NOTES
Chief Complaint   Patient presents with    Urgent Care     Congestion and wheezing, rattling and cracking sounds when breathing and exhaling and some cough x 2 days - took mucinex ER  last night       ASSESSMENT/PLAN:  Sayda was seen today for urgent care.    Diagnoses and all orders for this visit:    Acute bronchitis, unspecified organism  -     albuterol (PROAIR HFA/PROVENTIL HFA/VENTOLIN HFA) 108 (90 Base) MCG/ACT inhaler; Inhale 2 puffs into the lungs every 6 hours as needed for shortness of breath or wheezing  -     predniSONE (DELTASONE) 20 MG tablet; Take 2 tablets (40 mg) by mouth daily for 5 days    Acute cough  -     Symptomatic COVID-19 Virus (Coronavirus) by PCR Nose  -     XR Chest 2 Views; Future    Chest crackles  -     XR Chest 2 Views; Future    No evidence of pneumonia on exam or x-ray.  Vitals reassuring.  Patient feels overall well.  Suspect bronchitis, likely viral.  Given her history of asthma we will restart her on albuterol and step up to prednisone if not improving over the next few days.  Return precautions discussed    Asim Rene PA-C      SUBJECTIVE:  Sayda is a 38 year old female who presents to urgent care with 2 to 3 days of chest congestion, nasal congestion, wheezing and coughing.  She feels like a gurgling sound in her right upper chest and sometimes will have some pain when she takes a deep breath in this area.  No significant fatigue or malaise.  No shortness of breath, no significant chest pain and feels relatively well.  Her cousin had similar symptoms and she was around her before her symptoms began.    ROS: Pertinent ROS neg other than the symptoms noted above in the HPI.     OBJECTIVE:  /87 (BP Location: Left arm, Patient Position: Sitting, Cuff Size: Adult Large)   Pulse 86   Temp 98.8  F (37.1  C) (Tympanic)   Resp 17   Wt 106.6 kg (235 lb)   LMP 10/26/2023 (Exact Date)   SpO2 98%   Breastfeeding No   BMI 39.11 kg/m     GENERAL: healthy, alert and no  distress  EYES: Eyes grossly normal to inspection, PERRL and conjunctivae and sclerae normal  HENT: ear canals and TM's normal, nose and mouth without ulcers or lesions, mild oropharynx erythema  RESP: lungs clear to auscultation - no rales, rhonchi or wheezes, decreased expiration  CV: regular rate and rhythm, normal S1 S2, no S3 or S4, no murmur, click or rub,    DIAGNOSTICS  Xray - Reviewed and interpreted by me.  No acute cardiopulmonary abnormality noted  Results for orders placed or performed during the hospital encounter of 23   Echocardiogram Complete     Status: None   Result Value Ref Range    LVEF  60-65%     Narrative    [id number]  [id number]  [id number]  [id number]^KURT^LEW     [facility]  [facility]  [address]  [place] [address]  Phone [phone]  Fax [phone]     Name: ROHINI CRUZ  MRN: [id number]  : 1985  Study Date: 2023 07:58 AM  Age: 38 yrs  Gender: Female  Patient Location: [facility]  Reason For Study: Chest pain, unspecified type, Patent foramen ovale, Aortic  root  Ordering Physician: KURT SCHAFER  Referring Physician: Brittany Miles  Performed By: Evelina Henderson     BSA: 2.1 m2  Height: 65 in  Weight: 236 lb  HR: 79  BP: 138/86 mmHg  ______________________________________________________________________________  Procedure  Complete Echo Adult. Optison (NDC #[id number]) given intravenously.     ______________________________________________________________________________  Interpretation Summary     The visual ejection fraction is 60-65%.  The right ventricle is normal in structure, function and size.  The left atrium is mildly dilated.  There is mild (1+) tricuspid regurgitation.  ______________________________________________________________________________  Left Ventricle  The left ventricle is normal in structure, function and size. There is normal  left ventricular wall  thickness. The visual ejection fraction is 60-65%.  Diastolic Doppler findings (E/E' ratio and/or other parameters) suggest left  ventricular filling pressures are normal. No regional wall motion  abnormalities noted.     Right Ventricle  The right ventricle is normal in structure, function and size.     Atria  The left atrium is mildly dilated. Right atrial size is normal. There is no  color Doppler evidence of an atrial shunt.     Mitral Valve  The mitral valve is normal in structure and function. There is trace mitral  regurgitation.     Tricuspid Valve  The tricuspid valve is normal in structure and function. There is mild (1+)  tricuspid regurgitation. The right ventricular systolic pressure is  approximated at 19.2 mmHg plus the right atrial pressure.     Aortic Valve  The aortic valve is normal in structure and function. No aortic regurgitation  is present.     Pulmonic Valve  The pulmonic valve is not well seen, but is grossly normal.     Vessels  Normal size aorta. Normal size ascending aorta.     Pericardium  There is no pericardial effusion.     Rhythm  Sinus rhythm was noted.     ______________________________________________________________________________  MMode/2D Measurements & Calculations  IVSd: 0.80 cm  LVIDd: 4.5 cm  LVIDs: 2.8 cm  LVPWd: 0.90 cm  FS: 37.8 %  LV mass(C)d: 123.1 grams  LV mass(C)dI: 58.0 grams/m2  Ao root diam: 3.2 cm  LA dimension: 4.2 cm     asc Aorta Diam: 3.3 cm  LA/Ao: 1.3  Ao root diam index Ht(cm/m): 1.9  Ao root diam index BSA (cm/m2): 1.5  Asc Ao diam index BSA (cm/m2): 1.6  Asc Ao diam index Ht(cm/m): 2.0  LA Volume (BP): 70.2 ml  LA Volume Index (BP): 33.1 ml/m2  RV Base: 3.7 cm  RWT: 0.40     TAPSE: 2.3 cm     Doppler Measurements & Calculations  MV E max jessica: 102.0 cm/sec  MV A max jessica: 86.5 cm/sec  MV E/A: 1.2  MV dec slope: 736.0 cm/sec2  MV dec time: 0.14 sec  TR max jessica: 219.1 cm/sec  TR max P.2 mmHg  E/E' av.2  Lateral E/e': 8.7  Medial E/e': 9.7      ______________________________________________________________________________  Report approved by: Tal Pittman 11/07/2023 09:05 AM              Current Outpatient Medications   Medication    aspirin (ASA) 81 MG EC tablet    famotidine (PEPCID) 20 MG tablet    loratadine (CLARITIN) 10 MG tablet    metoprolol succinate ER (TOPROL XL) 50 MG 24 hr tablet    Multiple Vitamins-Minerals (MULTIVITAMIN GUMMIES WOMENS) CHEW    PROBIOTIC PRODUCT PO    VITAMIN D PO    zinc gluconate 50 MG tablet    acetaminophen (TYLENOL) 500 MG tablet    acetaminophen (TYLENOL) 650 MG CR tablet    albuterol (PROAIR HFA/PROVENTIL HFA/VENTOLIN HFA) 108 (90 Base) MCG/ACT inhaler    cyclobenzaprine (FLEXERIL) 10 MG tablet     No current facility-administered medications for this visit.      Patient Active Problem List   Diagnosis    Allergic rhinitis    Encounter for pre-operative cardiovascular clearance    Varicose Veins of Legs- painful with standing    Abnormal Mammogram- 2/2006 - prob benign     Heart palpitations - since 2010 intermittent. Reports multiple work-ups with unclear cause. Followed by cardiology.    CARDIOVASCULAR SCREENING; LDL GOAL LESS THAN 160    Anxiety    Migraine with aura and without status migrainosus, not intractable - since 2003; stable.    Mild persistent asthma without complication    Bilateral ankle pain    Family history of breast cancer - paternal grandma, paternal aunt and paternal great-grandmother. Dad completed genetic screening which was neg.     Class 2 obesity due to excess calories without serious comorbidity with body mass index (BMI) of 37.0 to 37.9 in adult    Segmental dysfunction of cervical region    Segmental dysfunction of thoracic region    Segmental dysfunction of lumbar region    Segmental dysfunction of sacral region    Mechanical back pain    Stroke (H)    Vertebral artery dissection (H24)    Borderline Aortic root enlargement (H)    History of stroke    Brain aneurysm     Hypertension goal BP (blood pressure) < 140/90    Morbid obesity (H)    Patent foramen ovale      Past Medical History:   Diagnosis Date    Allergic rhinitis, cause unspecified     Allergic rhinitis    Aneurysm (H24)     3.6 mm aneurysm off the ophthalmic L IC art  Seen CT, MRI    Cervicalgia 3/17/2011    Encounter for other general counseling or advice on contraception 9/12/2007     Diagnosis updated by automated process. Provider to review and confirm.    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     Past Surgical History:   Procedure Laterality Date    COLONOSCOPY N/A 12/1/2021    Procedure: Unable to complete EGD; COLONOSCOPY;  Surgeon: Gume Douglas MD;  Location: [facility]    ESOPHAGOSCOPY, GASTROSCOPY, DUODENOSCOPY (EGD), COMBINED N/A 12/1/2021    Procedure: ESOPHAGOGASTRODUODENOSCOPY (EGD) ATTEMPTED BUT UNABLE TO COMPLETE, needs deeper sedation; patient too gaggy;  Surgeon: Gume Douglas MD;  Location: [facility]    NO HISTORY OF SURGERY       Family History   Problem Relation Age of Onset    Lipids Father     Neurologic Disorder Father         epilepsy    Heart Disease Father 47        MI    Hypertension Father     Hyperlipidemia Father     Lipids Sister     Hyperlipidemia Sister     Breast Cancer Paternal Grandmother         3rd generation     Breast Cancer Paternal Aunt     Diabetes Mother         gestational     Asthma Mother     Diabetes  Paternal Grandfather     Other Cancer Maternal Grandfather         Skin    Hyperlipidemia Paternal Half-Sister     Breast Cancer Other     Other Cancer Maternal Grandmother         Lung    Ovarian Cancer Maternal Grandmother     Lung Cancer Maternal Grandmother     Diabetes Maternal Uncle     Diabetes Maternal Uncle     Colon Cancer No family hx of      Social History     Tobacco Use    Smoking status: Former     Packs/day: 0.50     Years: 2.00     Additional pack years: 0.00     Total pack years: 1.00     Types: Cigarettes     Start date:      Quit date: 2001     Years since quittin.7    Smokeless tobacco: Never   Substance Use Topics    Alcohol use: Yes     Comment: 1 glass of wine few times per month              The plan of care was discussed with the patient. They understand and agree with the course of treatment prescribed. A printed summary was given including instructions and medications.  The use of Dragon/Goalbook dictation services may have been used to construct the content in this note; any grammatical or spelling errors are non-intentional. Please contact the author of this note directly if you are in need of any clarification.

## 2023-11-08 ENCOUNTER — NURSE TRIAGE (OUTPATIENT)
Dept: NURSING | Facility: CLINIC | Age: 38
End: 2023-11-08
Payer: COMMERCIAL

## 2023-11-09 NOTE — TELEPHONE ENCOUNTER
Triage Call:    Caller: Patient  Took am meds in the evening, so two doses of everything today.  It included her metoprolol, Asprin, vitamins and allergy medication.  It has been over 12 hours.    Baseline B/P usually is 138/85 on meds.  It just happened right before calling into triage.        Protocol Recommended Disposition: Call PCP.    Paged to Dr. Lani Rodriguez at 11:23pm.    Call returned at 11:28pm. Skip the morning dose, check B/P and HR.  If having lightheadedness or dizziness, should call to PCP clinic.     Provider Recommendation Follow Up:   Reached patient/caregiver. Informed of provider's recommendations. Patient verbalized understanding and agrees with the plan.      Caller verbalized understanding of instructions and questions answered.      Socorro Hamilton RN on 11/8/2023 at 11:17 PM    Reason for Disposition   [1] DOUBLE DOSE (an extra dose or lesser amount) of prescription drug AND [2] NO symptoms  (Exception: A double dose of antibiotics.)    Additional Information   Negative: MORE THAN A DOUBLE DOSE of a prescription or over-the-counter (OTC) drug   Negative: [1] DOUBLE DOSE (an extra dose or lesser amount) of prescription drug AND [2] any symptoms (e.g., dizziness, nausea, pain, sleepiness)   Negative: [1] DOUBLE DOSE (an extra dose or lesser amount) of over-the-counter (OTC) drug AND [2] any symptoms (e.g., dizziness, nausea, pain, sleepiness)   Negative: Took another person's prescription drug    Protocols used: Medication Question Call-A-

## 2023-11-10 DIAGNOSIS — I10 BENIGN ESSENTIAL HYPERTENSION: ICD-10-CM

## 2023-11-10 RX ORDER — METOPROLOL SUCCINATE 50 MG/1
50 TABLET, EXTENDED RELEASE ORAL DAILY
Qty: 90 TABLET | Refills: 1 | Status: SHIPPED | OUTPATIENT
Start: 2023-11-10 | End: 2024-05-15

## 2023-11-13 ENCOUNTER — ANCILLARY PROCEDURE (OUTPATIENT)
Dept: GENERAL RADIOLOGY | Facility: CLINIC | Age: 38
End: 2023-11-13
Attending: INTERNAL MEDICINE
Payer: COMMERCIAL

## 2023-11-13 ENCOUNTER — NURSE TRIAGE (OUTPATIENT)
Dept: FAMILY MEDICINE | Facility: CLINIC | Age: 38
End: 2023-11-13

## 2023-11-13 ENCOUNTER — OFFICE VISIT (OUTPATIENT)
Dept: INTERNAL MEDICINE | Facility: CLINIC | Age: 38
End: 2023-11-13
Payer: COMMERCIAL

## 2023-11-13 VITALS
DIASTOLIC BLOOD PRESSURE: 84 MMHG | HEART RATE: 84 BPM | OXYGEN SATURATION: 98 % | WEIGHT: 236.4 LBS | TEMPERATURE: 98.1 F | SYSTOLIC BLOOD PRESSURE: 124 MMHG | BODY MASS INDEX: 39.34 KG/M2

## 2023-11-13 DIAGNOSIS — J45.21 MILD INTERMITTENT ASTHMA WITH ACUTE EXACERBATION: Primary | ICD-10-CM

## 2023-11-13 LAB
ERYTHROCYTE [DISTWIDTH] IN BLOOD BY AUTOMATED COUNT: 12.6 % (ref 10–15)
HCT VFR BLD AUTO: 41.4 % (ref 35–47)
HGB BLD-MCNC: 13.5 G/DL (ref 11.7–15.7)
MCH RBC QN AUTO: 26.3 PG (ref 26.5–33)
MCHC RBC AUTO-ENTMCNC: 32.6 G/DL (ref 31.5–36.5)
MCV RBC AUTO: 81 FL (ref 78–100)
PLATELET # BLD AUTO: 302 10E3/UL (ref 150–450)
RBC # BLD AUTO: 5.14 10E6/UL (ref 3.8–5.2)
WBC # BLD AUTO: 7.2 10E3/UL (ref 4–11)

## 2023-11-13 PROCEDURE — 71046 X-RAY EXAM CHEST 2 VIEWS: CPT | Mod: TC | Performed by: RADIOLOGY

## 2023-11-13 PROCEDURE — 36415 COLL VENOUS BLD VENIPUNCTURE: CPT | Performed by: INTERNAL MEDICINE

## 2023-11-13 PROCEDURE — 99214 OFFICE O/P EST MOD 30 MIN: CPT | Performed by: INTERNAL MEDICINE

## 2023-11-13 PROCEDURE — 85027 COMPLETE CBC AUTOMATED: CPT | Performed by: INTERNAL MEDICINE

## 2023-11-13 RX ORDER — PREDNISONE 20 MG/1
TABLET ORAL
Qty: 9 TABLET | Refills: 0 | Status: SHIPPED | OUTPATIENT
Start: 2023-11-13 | End: 2023-11-22

## 2023-11-13 RX ORDER — LEVALBUTEROL TARTRATE 45 UG/1
2 AEROSOL, METERED ORAL EVERY 6 HOURS PRN
Qty: 15 G | Refills: 1 | Status: SHIPPED | OUTPATIENT
Start: 2023-11-13 | End: 2024-06-14

## 2023-11-13 NOTE — PROGRESS NOTES
ASSESSMENT/PLAN                                                      (J45.21) Mild intermittent asthma with acute exacerbation  (primary encounter diagnosis)  Comment: poorly controlled without treatment.  Plan: CXR and CBC today to evaluate for pneumonia; prednisone taper and levalbuterol prescribed - patient to use as directed; if symptoms worsen, change, or do not improve, patient to contact MD.      Esther Vasquez MD   [address] [address]  T: [phone], F: [phone]    SUBJECTIVE                                                      Sayda Valles is a very pleasant 38 year old female who presents with an ongoing cough:    Symptoms started a week and a half ago.  Symptoms include a frequent, productive cough, wheezing, crackling sounds in her throat, and chest congestion.     Patient was seen in urgent care for same concerns 11/7/2023. CXR negative at that time. She was prescribed an albuterol inhaler and prednisone burst.  She has taken the albuterol inhaler a couple times but has largely avoided it due to her history of palpitations. She did not take the prednisone prescription also due to her history of palpitations.    Patient is concerned that her symptoms seem to be getting worse over time in spite of her recent urgent care visit.    No reported subjective fevers or chills.    No reported body aches, headaches, or fatigue.  No reported sinus congestion, pressure, or pain.  No reported rhinitis or sore throat.     OBJECTIVE                                                      /84   Pulse 84   Temp 98.1  F (36.7  C)   Wt 107.2 kg (236 lb 6.4 oz)   LMP 10/26/2023 (Exact Date)   SpO2 98%   BMI 39.34 kg/m    Constitutional: well-appearing; no apparent distress and not sick-appearing  Respiratory: normal respiratory effort; inspiratory and expiratory wheezes throughout; diminished airflow throughout; frequent cough throughout visit  Psych: normal  Medication refill protocol not available. Please advise on refill.  LV: 08/30/22  NV:none scheduled    judgment and insight; normal mood and affect; recent and remote memory intact    ---    (Note documentation was completed, in part, with [employer] voice-recognition software. Documentation was reviewed, but some grammatical, spelling, and word errors may remain.)

## 2023-11-13 NOTE — TELEPHONE ENCOUNTER
Reason for Call:  Appointment Request    Patient requesting this type of appt: Chronic Diease Management/Medication/Follow-Up    Requested provider: Brittany Miles    Reason patient unable to be scheduled: Not within requested timeframe    When does patient want to be seen/preferred time: Same day    Comments: Pt is open to seeing any provider available if PCP is unavailable.    Symptoms from 11/7 are getting worse    Could we send this information to you in [facility] or would you prefer to receive a phone call?:   Patient would prefer a phone call   Okay to leave a detailed message?: Yes at Cell number on file:    Telephone Information:   Mobile [phone]       Call taken on 11/13/2023 at 8:57 AM by DIANNE GANN

## 2023-11-13 NOTE — TELEPHONE ENCOUNTER
Situation   Patient concerned about her cough, requesting an appointment.     Background   [facility] 11/7 - prednisone and albuterol- did not take steroids  Hx of asthma   Did not use prednisone because she has baseline heart palpitations and the medication makes it worse.   Recent echo  Has used albuterol a couple time but does not like it because it makes her get heart palpitations  Asthma    Assessment   She states the chest congestion is deeper   The first week seemed in my throat now rattling seem lower into lungs.   Coughing so frequent, every couple minutes  When she coughs hard she gets yellow/green sputum up- that is a change the last few days- was clear before  Rib and back pain the last couple day when having a coughing spell  No SOB  No fever  Occasional exp wheeze.       Taking Mucinex- which seems to loosen phlegm.     Recommendations   Scheduled for same day appointment- advised of location, arrival and apt time.              Reason for Disposition   SEVERE coughing spells (e.g., whooping sound after coughing, vomiting after coughing)   Continuous (nonstop) coughing interferes with work or school and no improvement using cough treatment per Care Advice   Patient wants to be seen    Additional Information   Negative: Bluish (or gray) lips or face   Negative: SEVERE difficulty breathing (e.g., struggling for each breath, speaks in single words)   Negative: Rapid onset of cough and has hives   Negative: Coughing started suddenly after medicine, an allergic food or bee sting   Negative: Difficulty breathing after exposure to flames, smoke, or fumes   Negative: Sounds like a life-threatening emergency to the triager   Negative: Previous asthma attacks and this feels like asthma attack   Negative: Dry cough (non-productive; no sputum or minimal clear sputum) and within 14 days of COVID-19 Exposure   Negative: MODERATE difficulty breathing (e.g., speaks in phrases, SOB even at rest, pulse 100-120) and still present  when not coughing   Negative: Chest pain present when not coughing   Negative: Passed out (i.e., fainted, collapsed and was not responding)   Negative: Patient sounds very sick or weak to the triager   Negative: MILD difficulty breathing (e.g., minimal/no SOB at rest, SOB with walking, pulse <100) and still present when not coughing   Negative: Coughed up > 1 tablespoon (15 ml) blood (Exception: Blood-tinged sputum.)   Negative: Fever > 103 F (39.4 C)   Negative: Fever > 100.0 F (37.8 C) and has diabetes mellitus or a weak immune system (e.g., HIV positive, cancer chemotherapy, organ transplant, splenectomy, chronic steroids)   Negative: Fever > 101 F (38.3 C) and over 60 years of age   Negative: Fever > 100.0 F (37.8 C) and bedridden (e.g., CVA, chronic illness, recovering from surgery)   Negative: Increasing ankle swelling   Negative: Coughing up katherine-colored (reddish-brown) or blood-tinged sputum   Negative: Fever present > 3 days (72 hours)   Negative: Fever returns after gone for over 24 hours and symptoms worse or not improved   Negative: Using nasal washes and pain medicine > 24 hours and sinus pain persists   Negative: Known COPD or other severe lung disease (i.e., bronchiectasis, cystic fibrosis, lung surgery) and worsening symptoms (i.e., increased sputum purulence or amount, increased breathing difficulty)     Asthma   Commented on: Wheezing is present     Not present now, states at times exp wheeze at rest    Protocols used: Cough-A-OH

## 2023-11-14 ENCOUNTER — VIRTUAL VISIT (OUTPATIENT)
Dept: ENDOCRINOLOGY | Facility: CLINIC | Age: 38
End: 2023-11-14
Payer: COMMERCIAL

## 2023-11-14 DIAGNOSIS — E04.1 THYROID NODULE: ICD-10-CM

## 2023-11-14 PROCEDURE — 99204 OFFICE O/P NEW MOD 45 MIN: CPT | Mod: VID | Performed by: INTERNAL MEDICINE

## 2023-11-14 ASSESSMENT — PAIN SCALES - GENERAL: PAINLEVEL: NO PAIN (0)

## 2023-11-14 NOTE — PATIENT INSTRUCTIONS
-[facility]  Dr Eaton, Endocrinology Department    [facility] [place] [facility] [address] [id number]  [place] [address]  Appointment [phone]  Fax: [phone]  [place]: Monday - Thursday      Labs needed.  FNA of left dominant nodule with Radiology.  Follow up after that.     [facility] radiology scheduleing   [place]  [phone]   [facility] Radiology scheduling  [place]  [phone]     Please call and schedule the recommended test as discussed in clinic visit. These are the numbers to call.

## 2023-11-14 NOTE — LETTER
"    11/14/2023         RE: Sayda Valles  [address] [place] [address]        Dear Colleague,    Thank you for referring your patient, Sayda Valles, to the [facility]. Please see a copy of my visit note below.    THIS IS A VIDEO VISIT:    Phone call visit/virtual visit encounter:    Name of patient: Sayda Valles    Date of encounter: 11/14/2023    Time of start of video visit: 11:31    Video started: 11:39    Video ended: 11:54    Provider location: working from home/ [facility]    Patient location: patients home.    Mode of transmission: [employer] video/ [employer]    Verbal consent: obtained before starting visit. Pt is agreeable.      The patient has been notified of following:      \"This VIDEO visit will be conducted via a call between you and your physician/provider. We have found that certain health care needs can be provided without the need for a physical exam.  This service lets us provide the care you need with a short phone conversation.  If a prescription is necessary we can send it directly to your pharmacy.  If lab work is needed we can place an order for that and you can then stop by our lab to have the test done at a later time.     With new updates with corona virus patient might be billed as clinic visit.     If during the course of the call the physician/provider feels a telephone visit is not appropriate, you will not be charged for this service.\"      Past medical history, social history, family history, allergy and medications were reviewed and updated as appropriate.  Reviewed pertinent labs, notes, imaging studies personally.    Name: Sayda Valles  Seen in consultation with Brittany Miles CNP for thyroid nodule.   HPI:  Sayda Valles is a 38 year old female who presents for the evaluation of thyroid nodule.   has a past medical history of Allergic rhinitis, cause unspecified, Aneurysm (H24), Cervicalgia (3/17/2011), Encounter for other general counseling " or advice on contraception (9/12/2007), Family history of malignant neoplasm of breast, FAMILY HX BREAST MALIG, Heart palpitations (2/10), Hypertension goal BP (blood pressure) < 140/90 (9/15/2020), Migraine without aura, with intractable migraine, so stated, without mention of status migrainosus, Mild persistent asthma, Non morbid obesity due to excess calories (5/9/2016), Simple goiter (11/25/2008), and Stroke (H) (06/15/2019).    Initially diagnosed in 2020 as incidental finding on imaging.  Thyroid US 2020: 2 nodules were seen. Dominant one was 2.2 cm on left side.  S/p FNA of left dominant nodule:  Consistent with a benign nodule.  Follow up  US 6/2023:    Was also seen by ENT for vocal cord issues.    No FH of thyroid cancer  No history of radiation  No compressive s/s  Thyroid labs in acceptable range.  She is not on thyroid hormone replacement.  No other major risk factors.    Palpitations:  on and off X 15 years-- followed by cardiology  Diarrhea/Constipation:No  Changes in menses: regular  Dysphagia or Shortness of breath:No  Tremors:No  Changes in weight: stable  Wt Readings from Last 2 Encounters:   11/13/23 107.2 kg (236 lb 6.4 oz)   11/07/23 106.6 kg (235 lb)      PMH/PSH:  Past Medical History:   Diagnosis Date     Allergic rhinitis, cause unspecified     Allergic rhinitis     Aneurysm (H24)     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 Provider to review and confirm.     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     Past Surgical History:   Procedure Laterality Date     COLONOSCOPY N/A 12/1/2021    Procedure: Unable to complete EGD; COLONOSCOPY;  Surgeon: Gume Douglas MD;  Location:  GI     ESOPHAGOSCOPY, GASTROSCOPY, DUODENOSCOPY (EGD), COMBINED N/A 12/1/2021    Procedure: ESOPHAGOGASTRODUODENOSCOPY (EGD) ATTEMPTED BUT UNABLE TO COMPLETE, needs deeper sedation; patient too gaggy;  Surgeon: Gume Douglas MD;  Location: [facility]     NO HISTORY OF SURGERY       Family Hx:  Family History   Problem Relation Age of Onset     Lipids Father      Neurologic Disorder Father         epilepsy     Heart Disease Father 47        MI     Hypertension Father      Hyperlipidemia Father      Lipids Sister      Hyperlipidemia Sister      Breast Cancer Paternal Grandmother         3rd generation      Breast Cancer Paternal Aunt      Diabetes Mother         gestational      Asthma Mother      Diabetes Paternal Grandfather      Other Cancer Maternal Grandfather         Skin     Hyperlipidemia Paternal Half-Sister      Breast Cancer Other      Other Cancer Maternal Grandmother         Lung     Ovarian Cancer Maternal Grandmother      Lung Cancer Maternal Grandmother      Diabetes Maternal Uncle      Diabetes Maternal Uncle      Colon Cancer No family hx of                Social Hx:  Social History     Socioeconomic History     Marital status: Single     Spouse name: none     Number of children: Not on file     Years of education: Not on file     Highest education level: Not on file   Occupational History     Occupation:  at [employer]      Employer: OTHER   Tobacco Use     Smoking status: Former     Packs/day: 0.50     Years: 2.00     Additional pack years: 0.00     Total  pack years: 1.00     Types: Cigarettes     Start date:      Quit date: 2001     Years since quittin.7     Smokeless tobacco: Never   Vaping Use     Vaping Use: Never used   Substance and Sexual Activity     Alcohol use: Yes     Comment: 1 glass of wine few times per month     Drug use: No     Sexual activity: Not Currently     Partners: Male     Birth control/protection: Condom   Other Topics Concern      Service No     Blood Transfusions No     Caffeine Concern No     Occupational Exposure No     Hobby Hazards No     Sleep Concern No     Stress Concern No     Weight Concern Yes     Comment: Would like to lose more weight     Special Diet No     Back Care No     Exercise Yes     Comment: Moderate     Bike Helmet No     Seat Belt Yes     Comment: always      Self-Exams Yes     Comment: SBE encouraged monthly      Parent/sibling w/ CABG, MI or angioplasty before 65F 55M? Yes   Social History Narrative    Calcium - 2-3 dairy servings/ day     Menarche: at age 12    Menses: regular      Social Determinants of Health     Financial Resource Strain: Low Risk  (10/4/2023)    Financial Resource Strain      Within the past 12 months, have you or your family members you live with been unable to get utilities (heat, electricity) when it was really needed?: No   Food Insecurity: Low Risk  (10/4/2023)    Food Insecurity      Within the past 12 months, did you worry that your food would run out before you got money to buy more?: No      Within the past 12 months, did the food you bought just not last and you didn t have money to get more?: No   Transportation Needs: Low Risk  (10/4/2023)    Transportation Needs      Within the past 12 months, has lack of transportation kept you from medical appointments, getting your medicines, non-medical meetings or appointments, work, or from getting things that you need?: No   Physical Activity: Not on file   Stress: Not on file   Social Connections: Not on file    Interpersonal Safety: Not on file   Housing Stability: Low Risk  (10/4/2023)    Housing Stability      Do you have housing? : Yes      Are you worried about losing your housing?: No          MEDICATIONS:  has a current medication list which includes the following prescription(s): aspirin, famotidine, levalbuterol, loratadine, metoprolol succinate er, multivitamin gummies womens, prednisone, probiotic product, vitamin d, and zinc gluconate.    Review of Systems  10 point ROS neg other than the symptoms noted above in the HPI.    Physical Exam   VS: LMP 10/26/2023 (Exact Date)   GENERAL: healthy, alert and no distress  EYES: Eyes grossly normal to inspection, conjunctivae and sclerae normal  ENT: no nose swelling, nasal discharge.  Thyroid: no apparent thyroid nodules  RESP: no audible wheeze, cough, or visible cyanosis.  No visible retractions or increased work of breathing.  Able to speak fully in complete sentences.  ABDO: not evaluated.  EXTREMITIES: no hand tremors.  NEURO: Cranial nerves grossly intact, mentation intact and speech normal  SKIN: No apparent skin lesions, rash or edema seen   PSYCH: mentation appears normal, affect normal/bright, judgement and insight intact, normal speech and appearance well-groomed    LABS:  TFTs:  TSH   Date Value Ref Range Status   09/22/2020 0.72 0.40 - 4.00 mU/L Final     Thyroid Ultrasound 2023:  IMPRESSION:  1.  Bilateral thyroid nodules as documented above. The inferior left thyroid nodule demonstrates minimal interval enlargement. Consider repeat biopsy or close imaging surveillance.    Thyroid FNA 2020:  Thyroid, left , ultrasound guided fine needle aspiration:   Benign   Consistent with a benign nodule (includes adenomatoid nodule, colloid   nodule, etc.)     All pertinent notes, labs, and images personally reviewed by me.     A/P  Ms.Dena RAMON Valles is a 38 year old here for the evaluation of thyroid nodule:  #1 Thyroid Nodule:  No FH of thyroid cancer  No history of  radiation  No compressive s/s  Thyroid labs in acceptable range.  She is not on thyroid hormone replacement.  No other major risk factors.   Plan:  Discussed diagnosis, pathophysiology, management and treatment options of condition with pt.  Labs needed.  FNA of left dominant nodule with Radiology.  Follow up after that.  Plan: US Biopsy Thyroid Fine Needle Aspiration, TSH         with free T4 reflex          Discussed possible outcomes of biopsy including possible benign, possible malignancy and possible AUS. If AUS indication for molecular marker testing.  Discussed possible compressive symptoms and signs to watch for.  Discussed that Thyroid nodules are common and are frequently benign. Data suggest that the prevalence of palpable thyroid nodules is 3% to 7% in [place]; the prevalence is as high as 50% based on ultrasonography (US) or autopsy data. All patients with a palpable thyroid nodule, however, should undergo US examination. US-guided FNA (US-FNA) is recommended for nodules =10 mm; US-FNA is suggested for nodules <10 mm only if clinical information or US features are suspicious.  The frequency of thyroid nodules in general population was discussed. Also discussed possibility of malignancy, potential for thyroid autonomy.     Causes of thyroid Nodules: Benign (Multinodular goiter, Hashimoto s thyroiditis, Simple or hemorrhagic cysts, Follicular adenomas, Subacute thyroiditis) or Malignant(Papillary carcinoma, Follicular carcinoma, Hürthle cell carcinoma, Medullary carcinoma, Anaplastic carcinoma, Primary thyroid lymphoma, or Metastatic malignant lesion).    MultiNodule:  The risk of cancer is not significantly higher in palpable solitary thyroid nodules than in multinodular lesions or in nodules in diffuse goiters. In multinodular thyroid glands, the cytologic sampling should be focused on lesions characterized by suspicious US features rather than on larger or clinically dominant nodules.    Cyst:   Most complex thyroid nodules with a dominant fluid component are benign. USFNA, however, should always be performed because the rare papillary thyroid carcinoma (PTC) can be cystic. An unsatisfactory (nondiagnostic) specimen usually results from a cystic nodule that yields few or no follicular cells. Reaspiration yields satisfactory results in 50% of cases.    All questions were answered.  The patient indicates understanding of the above issues and agrees with the plan set forth.    Follow-up:  As noted in AVS    Ashia Eaton MD  Endocrinology   [facility]/[place]    Cc: Brittany Miles    Addendum to above note and clinic visit:    Labs reviewed.    See result note/telephone encounter.            Again, thank you for allowing me to participate in the care of your patient.        Sincerely,        Ashia Eaton MD

## 2023-11-14 NOTE — PROGRESS NOTES
"THIS IS A VIDEO VISIT:    Phone call visit/virtual visit encounter:    Name of patient: Sayda Valles    Date of encounter: 11/14/2023    Time of start of video visit: 11:31    Video started: 11:39    Video ended: 11:54    Provider location: working from home/ [facility]    Patient location: patients home.    Mode of transmission: [employer] video/ [employer]    Verbal consent: obtained before starting visit. Pt is agreeable.      The patient has been notified of following:      \"This VIDEO visit will be conducted via a call between you and your physician/provider. We have found that certain health care needs can be provided without the need for a physical exam.  This service lets us provide the care you need with a short phone conversation.  If a prescription is necessary we can send it directly to your pharmacy.  If lab work is needed we can place an order for that and you can then stop by our lab to have the test done at a later time.     With new updates with corona virus patient might be billed as clinic visit.     If during the course of the call the physician/provider feels a telephone visit is not appropriate, you will not be charged for this service.\"      Past medical history, social history, family history, allergy and medications were reviewed and updated as appropriate.  Reviewed pertinent labs, notes, imaging studies personally.    Name: Sayda Valles  Seen in consultation with Brittany Miles CNP for thyroid nodule.   HPI:  Sayda Valles is a 38 year old female who presents for the evaluation of thyroid nodule.   has a past medical history of Allergic rhinitis, cause unspecified, Aneurysm (H24), Cervicalgia (3/17/2011), Encounter for other general counseling or advice on contraception (9/12/2007), Family history of malignant neoplasm of breast, FAMILY HX BREAST MALIG, Heart palpitations (2/10), Hypertension goal BP (blood pressure) < 140/90 (9/15/2020), Migraine without aura, with intractable " migraine, so stated, without mention of status migrainosus, Mild persistent asthma, Non morbid obesity due to excess calories (5/9/2016), Simple goiter (11/25/2008), and Stroke (H) (06/15/2019).    Initially diagnosed in 2020 as incidental finding on imaging.  Thyroid US 2020: 2 nodules were seen. Dominant one was 2.2 cm on left side.  S/p FNA of left dominant nodule:  Consistent with a benign nodule.  Follow up  US 6/2023:    Was also seen by ENT for vocal cord issues.    No FH of thyroid cancer  No history of radiation  No compressive s/s  Thyroid labs in acceptable range.  She is not on thyroid hormone replacement.  No other major risk factors.    Palpitations:  on and off X 15 years-- followed by cardiology  Diarrhea/Constipation:No  Changes in menses: regular  Dysphagia or Shortness of breath:No  Tremors:No  Changes in weight: stable  Wt Readings from Last 2 Encounters:   11/13/23 107.2 kg (236 lb 6.4 oz)   11/07/23 106.6 kg (235 lb)      PMH/PSH:  Past Medical History:   Diagnosis Date    Allergic rhinitis, cause unspecified     Allergic rhinitis    Aneurysm (H24)     3.6 mm aneurysm off the ophthalmic L IC art  Seen CT, MRI    Cervicalgia 3/17/2011    Encounter for other general counseling or advice on contraception 9/12/2007     Diagnosis updated by automated process. Provider to review and confirm.    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      Past Surgical History:   Procedure Laterality Date    COLONOSCOPY N/A 2021    Procedure: Unable to complete EGD; COLONOSCOPY;  Surgeon: Gume Douglas MD;  Location: [facility]    ESOPHAGOSCOPY, GASTROSCOPY, DUODENOSCOPY (EGD), COMBINED N/A 2021    Procedure: ESOPHAGOGASTRODUODENOSCOPY (EGD) ATTEMPTED BUT UNABLE TO COMPLETE, needs deeper sedation; patient too gaggy;  Surgeon: Gume Douglas MD;  Location: [facility]    NO HISTORY OF SURGERY       Family Hx:  Family History   Problem Relation Age of Onset    Lipids Father     Neurologic Disorder Father         epilepsy    Heart Disease Father 47        MI    Hypertension Father     Hyperlipidemia Father     Lipids Sister     Hyperlipidemia Sister     Breast Cancer Paternal Grandmother         3rd generation     Breast Cancer Paternal Aunt     Diabetes Mother         gestational     Asthma Mother     Diabetes Paternal Grandfather     Other Cancer Maternal Grandfather         Skin    Hyperlipidemia Paternal Half-Sister     Breast Cancer Other     Other Cancer Maternal Grandmother         Lung    Ovarian Cancer Maternal Grandmother     Lung Cancer Maternal Grandmother     Diabetes Maternal Uncle     Diabetes Maternal Uncle     Colon Cancer No family hx of                Social Hx:  Social History     Socioeconomic History    Marital status: Single     Spouse name: none    Number of children: Not on file    Years of education: Not on file    Highest education level: Not on file   Occupational History    Occupation:  at [employer]      Employer: OTHER   Tobacco Use    Smoking status: Former     Packs/day: 0.50     Years: 2.00     Additional pack years: 0.00     Total pack years: 1.00     Types: Cigarettes     Start date:      Quit date: 2001     Years since quittin.7    Smokeless tobacco: Never   Vaping Use    Vaping Use: Never used   Substance and Sexual Activity    Alcohol use: Yes     Comment: 1 glass of wine few times per  month    Drug use: No    Sexual activity: Not Currently     Partners: Male     Birth control/protection: Condom   Other Topics Concern     Service No    Blood Transfusions No    Caffeine Concern No    Occupational Exposure No    Hobby Hazards No    Sleep Concern No    Stress Concern No    Weight Concern Yes     Comment: Would like to lose more weight    Special Diet No    Back Care No    Exercise Yes     Comment: Moderate    Bike Helmet No    Seat Belt Yes     Comment: always     Self-Exams Yes     Comment: SBE encouraged monthly     Parent/sibling w/ CABG, MI or angioplasty before 65F 55M? Yes   Social History Narrative    Calcium - 2-3 dairy servings/ day     Menarche: at age 12    Menses: regular      Social Determinants of Health     Financial Resource Strain: Low Risk  (10/4/2023)    Financial Resource Strain     Within the past 12 months, have you or your family members you live with been unable to get utilities (heat, electricity) when it was really needed?: No   Food Insecurity: Low Risk  (10/4/2023)    Food Insecurity     Within the past 12 months, did you worry that your food would run out before you got money to buy more?: No     Within the past 12 months, did the food you bought just not last and you didn t have money to get more?: No   Transportation Needs: Low Risk  (10/4/2023)    Transportation Needs     Within the past 12 months, has lack of transportation kept you from medical appointments, getting your medicines, non-medical meetings or appointments, work, or from getting things that you need?: No   Physical Activity: Not on file   Stress: Not on file   Social Connections: Not on file   Interpersonal Safety: Not on file   Housing Stability: Low Risk  (10/4/2023)    Housing Stability     Do you have housing? : Yes     Are you worried about losing your housing?: No          MEDICATIONS:  has a current medication list which includes the following prescription(s): aspirin, famotidine,  levalbuterol, loratadine, metoprolol succinate er, multivitamin gummies womens, prednisone, probiotic product, vitamin d, and zinc gluconate.    Review of Systems  10 point ROS neg other than the symptoms noted above in the HPI.    Physical Exam   VS: LMP 10/26/2023 (Exact Date)   GENERAL: healthy, alert and no distress  EYES: Eyes grossly normal to inspection, conjunctivae and sclerae normal  ENT: no nose swelling, nasal discharge.  Thyroid: no apparent thyroid nodules  RESP: no audible wheeze, cough, or visible cyanosis.  No visible retractions or increased work of breathing.  Able to speak fully in complete sentences.  ABDO: not evaluated.  EXTREMITIES: no hand tremors.  NEURO: Cranial nerves grossly intact, mentation intact and speech normal  SKIN: No apparent skin lesions, rash or edema seen   PSYCH: mentation appears normal, affect normal/bright, judgement and insight intact, normal speech and appearance well-groomed    LABS:  TFTs:  TSH   Date Value Ref Range Status   09/22/2020 0.72 0.40 - 4.00 mU/L Final     Thyroid Ultrasound 2023:  IMPRESSION:  1.  Bilateral thyroid nodules as documented above. The inferior left thyroid nodule demonstrates minimal interval enlargement. Consider repeat biopsy or close imaging surveillance.    Thyroid FNA 2020:  Thyroid, left , ultrasound guided fine needle aspiration:   Benign   Consistent with a benign nodule (includes adenomatoid nodule, colloid   nodule, etc.)     All pertinent notes, labs, and images personally reviewed by me.     A/P  Ms.Dena RAMON Valles is a 38 year old here for the evaluation of thyroid nodule:  #1 Thyroid Nodule:  No FH of thyroid cancer  No history of radiation  No compressive s/s  Thyroid labs in acceptable range.  She is not on thyroid hormone replacement.  No other major risk factors.   Plan:  Discussed diagnosis, pathophysiology, management and treatment options of condition with pt.  Labs needed.  FNA of left dominant nodule with  Radiology.  Follow up after that.  Plan: US Biopsy Thyroid Fine Needle Aspiration, TSH         with free T4 reflex          Discussed possible outcomes of biopsy including possible benign, possible malignancy and possible AUS. If AUS indication for molecular marker testing.  Discussed possible compressive symptoms and signs to watch for.  Discussed that Thyroid nodules are common and are frequently benign. Data suggest that the prevalence of palpable thyroid nodules is 3% to 7% in [place]; the prevalence is as high as 50% based on ultrasonography (US) or autopsy data. All patients with a palpable thyroid nodule, however, should undergo US examination. US-guided FNA (US-FNA) is recommended for nodules ?10 mm; US-FNA is suggested for nodules <10 mm only if clinical information or US features are suspicious.  The frequency of thyroid nodules in general population was discussed. Also discussed possibility of malignancy, potential for thyroid autonomy.     Causes of thyroid Nodules: Benign (Multinodular goiter, Hashimoto s thyroiditis, Simple or hemorrhagic cysts, Follicular adenomas, Subacute thyroiditis) or Malignant(Papillary carcinoma, Follicular carcinoma, Hürthle cell carcinoma, Medullary carcinoma, Anaplastic carcinoma, Primary thyroid lymphoma, or Metastatic malignant lesion).    MultiNodule:  The risk of cancer is not significantly higher in palpable solitary thyroid nodules than in multinodular lesions or in nodules in diffuse goiters. In multinodular thyroid glands, the cytologic sampling should be focused on lesions characterized by suspicious US features rather than on larger or clinically dominant nodules.    Cyst:  Most complex thyroid nodules with a dominant fluid component are benign. USFNA, however, should always be performed because the rare papillary thyroid carcinoma (PTC) can be cystic. An unsatisfactory (nondiagnostic) specimen usually results from a cystic nodule that yields few or no  follicular cells. Reaspiration yields satisfactory results in 50% of cases.    All questions were answered.  The patient indicates understanding of the above issues and agrees with the plan set forth.    Follow-up:  As noted in AVS    Ashia Eaton MD  Endocrinology   [facility]    Cc: Brittany Miles    Addendum to above note and clinic visit:    Labs reviewed.    See result note/telephone encounter.

## 2023-11-14 NOTE — NURSING NOTE
Is the patient currently in the state of [place]? YES    Visit mode:VIDEO    If the visit is dropped, the patient can be reconnected by: VIDEO VISIT: Text to cell phone:   Telephone Information:   Mobile [phone]       Will anyone else be joining the visit? NO  (If patient encounters technical issues they should call [phone]    How would you like to obtain your AVS? MyChart    Are changes needed to the allergy or medication list? No    Reason for visit: Consult    Nilda FERNANDEZ

## 2023-11-21 ENCOUNTER — HOSPITAL ENCOUNTER (OUTPATIENT)
Dept: ULTRASOUND IMAGING | Facility: CLINIC | Age: 38
Discharge: HOME | End: 2023-11-21
Attending: INTERNAL MEDICINE
Payer: COMMERCIAL

## 2023-11-21 ENCOUNTER — LAB (OUTPATIENT)
Dept: LAB | Facility: CLINIC | Age: 38
End: 2023-11-21
Attending: INTERNAL MEDICINE
Payer: COMMERCIAL

## 2023-11-21 DIAGNOSIS — E04.1 THYROID NODULE: ICD-10-CM

## 2023-11-21 DIAGNOSIS — Z77.21 EXPOSURE TO BLOOD OR BODY FLUID: Primary | ICD-10-CM

## 2023-11-21 DIAGNOSIS — Z77.21 EXPOSURE TO BLOOD OR BODY FLUID: ICD-10-CM

## 2023-11-21 LAB
HBV SURFACE AG SERPL QL IA: NONREACTIVE
HIV 1+2 AB+HIV1 P24 AG SERPL QL IA: NONREACTIVE
HIV 1+2 AB+HIV1P24 AG SERPLBLD IA.RAPID: NON REACTIVE
HIV 1+2 AB+HIV1P24 AG SERPLBLD IA.RAPID: NON REACTIVE
HIV INTERPRETATION: NORMAL
TSH SERPL DL<=0.005 MIU/L-ACNC: 0.42 UIU/ML (ref 0.3–4.2)

## 2023-11-21 PROCEDURE — 84443 ASSAY THYROID STIM HORMONE: CPT

## 2023-11-21 PROCEDURE — 272N000431 US BIOPSY THYROID FINE NEEDLE ASPIRATION

## 2023-11-21 PROCEDURE — 999N000104 HEPATITIS C RNA, QUANTITATIVE BY PCR

## 2023-11-21 PROCEDURE — 88173 CYTOPATH EVAL FNA REPORT: CPT | Mod: TC | Performed by: INTERNAL MEDICINE

## 2023-11-21 PROCEDURE — 36415 COLL VENOUS BLD VENIPUNCTURE: CPT

## 2023-11-21 NOTE — PROCEDURES
Interventional Radiology Post-Procedure Note     Patient name:  Sayda Valles  MRN:  [id number]   Date:  11/21/2023     Procedure(s): Left thyroid nodule FNA    Attending:  Joy    Sedation:  None    Pre/Post Procedure Diagnosis: Thyroid nodules    Drains/Lines:  None    Specimen(s):  x3 FNA samples    Estimated Blood Loss:  Minimal    Complications:  None     Findings:  Uneventful procedure, please see dictation in PACS or under the Imaging tab in EPIC for detailed procedure note.    Plan:  Patient may discharge now. Follow-up path results.      Parvez Hamilton MD  Vascular & Interventional Radiology  [facility]  11/21/2023  12:21 PM

## 2023-11-21 NOTE — PROGRESS NOTES
Patient tolerated left thyroid nodule biopsy well by Dr. Hamilton.  Bandage clean, dry and intact at time of discharge.  Patient states understanding of discharge instructions and left department in satisfactory condition.

## 2023-11-22 LAB — HCV RNA SERPL NAA+PROBE-ACNC: NOT DETECTED IU/ML

## 2023-11-24 ENCOUNTER — MYC MEDICAL ADVICE (OUTPATIENT)
Dept: ENDOCRINOLOGY | Facility: CLINIC | Age: 38
End: 2023-11-24
Payer: COMMERCIAL

## 2023-11-24 ENCOUNTER — MYC MEDICAL ADVICE (OUTPATIENT)
Dept: FAMILY MEDICINE | Facility: CLINIC | Age: 38
End: 2023-11-24
Payer: COMMERCIAL

## 2023-11-24 LAB
PATH REPORT.COMMENTS IMP SPEC: ABNORMAL
PATH REPORT.COMMENTS IMP SPEC: ABNORMAL
PATH REPORT.COMMENTS IMP SPEC: YES
PATH REPORT.FINAL DX SPEC: ABNORMAL
PATH REPORT.GROSS SPEC: ABNORMAL
PATH REPORT.MICROSCOPIC SPEC OTHER STN: ABNORMAL
PATH REPORT.RELEVANT HX SPEC: ABNORMAL

## 2023-11-24 PROCEDURE — 88173 CYTOPATH EVAL FNA REPORT: CPT | Mod: 26

## 2023-11-25 ENCOUNTER — TELEPHONE (OUTPATIENT)
Dept: ENDOCRINOLOGY | Facility: CLINIC | Age: 38
End: 2023-11-25
Payer: COMMERCIAL

## 2023-11-25 NOTE — TELEPHONE ENCOUNTER
Please schedule pt on 11/29 at 1:30 for in person visit.  Currently that slot is open.  If it is filled by the time you see this message please inform me ASAP.

## 2023-11-25 NOTE — RESULT ENCOUNTER NOTE
Sayda    Recently done endocrinology lab test/ imaging test showed:  Thyroid hormone levels in range.    Here is a copy for your records.    Please call endocrinology clinic ( [phone]) if questions.    Ashia Eaton MD  Endocrinology   [facility]  November 25, 2023

## 2023-11-27 ENCOUNTER — TELEPHONE (OUTPATIENT)
Dept: ENDOCRINOLOGY | Facility: CLINIC | Age: 38
End: 2023-11-27

## 2023-11-27 NOTE — TELEPHONE ENCOUNTER
Please see result note.  FNA is consistent with atypia of undetermined significance that-this is indeterminate and risk of malignancy is 10-30%.  Possible options include molecular marker testing, repeat FNA.  Okay to follow-up as scheduled and will discuss in details at that time.  Cytology does carry the sample for few weeks so that test can be ordered.

## 2023-11-27 NOTE — TELEPHONE ENCOUNTER
Please help schedule open [facility] on Dec 6th.  Other slot that is mentioned in previous telephone encounter is no longer available.

## 2023-11-27 NOTE — TELEPHONE ENCOUNTER
Please see my chart message below     Please review and advise     Thank you     Jamaica Woodruff RN, BSN  [place] [facility]

## 2023-11-27 NOTE — TELEPHONE ENCOUNTER
Thyroid gland, left lobe nodule (2.7 cm) fine needle aspiration:                 Interpretation:                   Atypia of Undetermined Significance (AUS).  See description and comment.

## 2023-12-06 ENCOUNTER — OFFICE VISIT (OUTPATIENT)
Dept: ENDOCRINOLOGY | Facility: CLINIC | Age: 38
End: 2023-12-06
Payer: COMMERCIAL

## 2023-12-06 VITALS
HEIGHT: 65 IN | WEIGHT: 235 LBS | SYSTOLIC BLOOD PRESSURE: 130 MMHG | HEART RATE: 96 BPM | RESPIRATION RATE: 18 BRPM | DIASTOLIC BLOOD PRESSURE: 92 MMHG | BODY MASS INDEX: 39.15 KG/M2 | TEMPERATURE: 98 F | OXYGEN SATURATION: 99 %

## 2023-12-06 DIAGNOSIS — E04.1 THYROID NODULE: Primary | ICD-10-CM

## 2023-12-06 PROCEDURE — 99214 OFFICE O/P EST MOD 30 MIN: CPT | Performed by: INTERNAL MEDICINE

## 2023-12-06 NOTE — LETTER
12/6/2023         RE: Sayda Valles  [address] [place] [id number]        Dear Colleague,    Thank you for referring your patient, Sayda Valles, to the [facility]. Please see a copy of my visit note below.    Name: Sayda Valles  Seen for follow-up of thyroid nodule.  She is here with her cousin.  HPI:  Sayda Valles is a 38 year old female who presents for the evaluation of thyroid nodule.   has a past medical history of Allergic rhinitis, cause unspecified, Aneurysm (H24), Cervicalgia (3/17/2011), Encounter for other general counseling or advice on contraception (9/12/2007), Family history of malignant neoplasm of breast, FAMILY HX BREAST MALIG, Heart palpitations (2/10), Hypertension goal BP (blood pressure) < 140/90 (9/15/2020), Migraine without aura, with intractable migraine, so stated, without mention of status migrainosus, Mild persistent asthma, Non morbid obesity due to excess calories (5/9/2016), Simple goiter (11/25/2008), and Stroke (H) (06/15/2019).    Initially diagnosed in 2020 as incidental finding on imaging.  Thyroid US 2020: 2 nodules were seen. Dominant one was 2.2 cm on left side.  S/p FNA of left dominant nodule:  Consistent with a benign nodule.  Follow up  US 6/2023: 3 nodules seen. Interval increase in nodule #2 on left.  S/p FNA 11/2023 of left nodule c/w AUS.  Here to discuss results.    Was also seen by ENT for vocal cord issues.    No FH of thyroid cancer  No history of radiation  No compressive s/s  Thyroid labs in acceptable range.  She is not on thyroid hormone replacement.  No other major risk factors.    Palpitations:  on and off X 15 years-- followed by cardiology  Diarrhea/Constipation:No  Changes in menses: regular  Dysphagia or Shortness of breath:No  Tremors:No  Changes in weight: stable  Wt Readings from Last 2 Encounters:   11/13/23 107.2 kg (236 lb 6.4 oz)   11/07/23 106.6 kg (235 lb)      PMH/PSH:  Past Medical History:   Diagnosis Date      Allergic rhinitis, cause unspecified     Allergic rhinitis     Aneurysm (H24)     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 Provider to review and confirm.     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     Past Surgical History:   Procedure Laterality Date     COLONOSCOPY N/A 12/1/2021    Procedure: Unable to complete EGD; COLONOSCOPY;  Surgeon: Gume Douglas MD;  Location:  GI     ESOPHAGOSCOPY, GASTROSCOPY, DUODENOSCOPY (EGD), COMBINED N/A 12/1/2021    Procedure: ESOPHAGOGASTRODUODENOSCOPY (EGD) ATTEMPTED BUT UNABLE TO COMPLETE, needs deeper sedation; patient too gaggy;  Surgeon: Gume Douglas MD;  Location: [facility]     NO HISTORY OF SURGERY       Family Hx:  Family History   Problem Relation Age of Onset     Lipids Father      Neurologic Disorder Father         epilepsy     Heart Disease Father 47        MI     Hypertension Father      Hyperlipidemia Father      Lipids Sister      Hyperlipidemia Sister      Breast Cancer Paternal Grandmother         3rd generation      Breast Cancer Paternal Aunt      Diabetes Mother         gestational      Asthma Mother      Diabetes Paternal Grandfather      Other Cancer Maternal Grandfather         Skin     Hyperlipidemia Paternal  Half-Sister      Breast Cancer Other      Other Cancer Maternal Grandmother         Lung     Ovarian Cancer Maternal Grandmother      Lung Cancer Maternal Grandmother      Diabetes Maternal Uncle      Diabetes Maternal Uncle      Colon Cancer No family hx of                Social Hx:  Social History     Socioeconomic History     Marital status: Single     Spouse name: none     Number of children: Not on file     Years of education: Not on file     Highest education level: Not on file   Occupational History     Occupation:  at [employer]      Employer: OTHER   Tobacco Use     Smoking status: Former     Packs/day: 0.50     Years: 2.00     Additional pack years: 0.00     Total pack years: 1.00     Types: Cigarettes     Start date:      Quit date: 2001     Years since quittin.8     Smokeless tobacco: Never   Vaping Use     Vaping Use: Never used   Substance and Sexual Activity     Alcohol use: Yes     Comment: 1 glass of wine few times per month     Drug use: No     Sexual activity: Not Currently     Partners: Male     Birth control/protection: Condom   Other Topics Concern      Service No     Blood Transfusions No     Caffeine Concern No     Occupational Exposure No     Hobby Hazards No     Sleep Concern No     Stress Concern No     Weight Concern Yes     Comment: Would like to lose more weight     Special Diet No     Back Care No     Exercise Yes     Comment: Moderate     Bike Helmet No     Seat Belt Yes     Comment: always      Self-Exams Yes     Comment: SBE encouraged monthly      Parent/sibling w/ CABG, MI or angioplasty before 65F 55M? Yes   Social History Narrative    Calcium - 2-3 dairy servings/ day     Menarche: at age 12    Menses: regular      Social Determinants of Health     Financial Resource Strain: Low Risk  (10/4/2023)    Financial Resource Strain      Within the past 12 months, have you or your family members you live with been unable to get utilities (heat,  electricity) when it was really needed?: No   Food Insecurity: Low Risk  (10/4/2023)    Food Insecurity      Within the past 12 months, did you worry that your food would run out before you got money to buy more?: No      Within the past 12 months, did the food you bought just not last and you didn t have money to get more?: No   Transportation Needs: Low Risk  (10/4/2023)    Transportation Needs      Within the past 12 months, has lack of transportation kept you from medical appointments, getting your medicines, non-medical meetings or appointments, work, or from getting things that you need?: No   Physical Activity: Not on file   Stress: Not on file   Social Connections: Not on file   Interpersonal Safety: Not on file   Housing Stability: Low Risk  (10/4/2023)    Housing Stability      Do you have housing? : Yes      Are you worried about losing your housing?: No          MEDICATIONS:  has a current medication list which includes the following prescription(s): aspirin, famotidine, loratadine, metoprolol succinate er, multivitamin gummies womens, probiotic product, vitamin d, zinc gluconate, and levalbuterol.    Review of Systems  10 point ROS neg other than the symptoms noted above in the HPI.    Physical Exam   VS: LMP 10/26/2023 (Exact Date)   GENERAL: healthy, alert and no distress  EYES: Eyes grossly normal to inspection, conjunctivae and sclerae normal  ENT: no nose swelling, nasal discharge.  Thyroid: no apparent thyroid nodules  RESP: no audible wheeze, cough, or visible cyanosis.  No visible retractions or increased work of breathing.  Able to speak fully in complete sentences.  ABDO: not evaluated.  EXTREMITIES: no hand tremors.  NEURO: Cranial nerves grossly intact, mentation intact and speech normal  SKIN: No apparent skin lesions, rash or edema seen   PSYCH: mentation appears normal, affect normal/bright, judgement and insight intact, normal speech and appearance well-groomed    LABS:  TFTs:  TSH    Date Value Ref Range Status   11/21/2023 0.42 0.30 - 4.20 uIU/mL Final   09/22/2020 0.72 0.40 - 4.00 mU/L Final     Thyroid Ultrasound 2023:  IMPRESSION:  1.  Bilateral thyroid nodules as documented above. The inferior left thyroid nodule demonstrates minimal interval enlargement. Consider repeat biopsy or close imaging surveillance.    Thyroid FNA 2020:  Thyroid, left , ultrasound guided fine needle aspiration:   Benign   Consistent with a benign nodule (includes adenomatoid nodule, colloid   nodule, etc.)     FNA of left dominant nodule 11/2023:    All pertinent notes, labs, and images personally reviewed by me.     A/P  Ms.Dena RAMON Valles is a 38 year old here for the evaluation of thyroid nodule:  #1 Thyroid Nodule:  FNA of left dominant nodule consistent with AUS   no FH of thyroid cancer  No history of radiation  No compressive s/s  Thyroid labs in acceptable range.  She is not on thyroid hormone replacement.  No other major risk factors.   Plan:  Discussed diagnosis, pathophysiology, management and treatment options of condition with pt.  Discussed diagnosis of atypia of undetermined significance in general.  Discussed next possible management including repeating biopsy, molecular marker testing and surgical removal.  Plan for molecular marker testing.  Follow up after that.          Discussed possible outcomes of biopsy including possible benign, possible malignancy and possible AUS. If AUS indication for molecular marker testing.  Discussed possible compressive symptoms and signs to watch for.  Discussed that Thyroid nodules are common and are frequently benign. Data suggest that the prevalence of palpable thyroid nodules is 3% to 7% in [place]; the prevalence is as high as 50% based on ultrasonography (US) or autopsy data. All patients with a palpable thyroid nodule, however, should undergo US examination. US-guided FNA (US-FNA) is recommended for nodules =10 mm; US-FNA is suggested for nodules <10 mm  only if clinical information or US features are suspicious.  The frequency of thyroid nodules in general population was discussed. Also discussed possibility of malignancy, potential for thyroid autonomy.     Causes of thyroid Nodules: Benign (Multinodular goiter, Hashimoto s thyroiditis, Simple or hemorrhagic cysts, Follicular adenomas, Subacute thyroiditis) or Malignant(Papillary carcinoma, Follicular carcinoma, Hürthle cell carcinoma, Medullary carcinoma, Anaplastic carcinoma, Primary thyroid lymphoma, or Metastatic malignant lesion).    MultiNodule:  The risk of cancer is not significantly higher in palpable solitary thyroid nodules than in multinodular lesions or in nodules in diffuse goiters. In multinodular thyroid glands, the cytologic sampling should be focused on lesions characterized by suspicious US features rather than on larger or clinically dominant nodules.    Cyst:  Most complex thyroid nodules with a dominant fluid component are benign. USFNA, however, should always be performed because the rare papillary thyroid carcinoma (PTC) can be cystic. An unsatisfactory (nondiagnostic) specimen usually results from a cystic nodule that yields few or no follicular cells. Reaspiration yields satisfactory results in 50% of cases.    All questions were answered.  The patient indicates understanding of the above issues and agrees with the plan set forth.    Follow-up:  As noted in AVS    Ashia Eaton MD  Endocrinology   [facility]    Cc: Brittany Miles    Addendum to above note and clinic visit:    Labs reviewed.    See result note/telephone encounter.            Again, thank you for allowing me to participate in the care of your patient.        Sincerely,        Ashia Eaton MD

## 2023-12-06 NOTE — PATIENT INSTRUCTIONS
-[facility]  Dr Eaton, Endocrinology Department    [place] [address] [id number]  [place] [address]  Appointment [phone]  Fax: [phone]  [place]: Monday - Thursday      Plan for molecular marker testing.  Follow up after that.

## 2023-12-06 NOTE — PROGRESS NOTES
Name: Sayda Valles  Seen for follow-up of thyroid nodule.  She is here with her cousin.  HPI:  Sayda Valles is a 38 year old female who presents for the evaluation of thyroid nodule.   has a past medical history of Allergic rhinitis, cause unspecified, Aneurysm (H24), Cervicalgia (3/17/2011), Encounter for other general counseling or advice on contraception (9/12/2007), Family history of malignant neoplasm of breast, FAMILY HX BREAST MALIG, Heart palpitations (2/10), Hypertension goal BP (blood pressure) < 140/90 (9/15/2020), Migraine without aura, with intractable migraine, so stated, without mention of status migrainosus, Mild persistent asthma, Non morbid obesity due to excess calories (5/9/2016), Simple goiter (11/25/2008), and Stroke (H) (06/15/2019).    Initially diagnosed in 2020 as incidental finding on imaging.  Thyroid US 2020: 2 nodules were seen. Dominant one was 2.2 cm on left side.  S/p FNA of left dominant nodule:  Consistent with a benign nodule.  Follow up  US 6/2023: 3 nodules seen. Interval increase in nodule #2 on left.  S/p FNA 11/2023 of left nodule c/w AUS.  Here to discuss results.    Was also seen by ENT for vocal cord issues.    No FH of thyroid cancer  No history of radiation  No compressive s/s  Thyroid labs in acceptable range.  She is not on thyroid hormone replacement.  No other major risk factors.    Palpitations:  on and off X 15 years-- followed by cardiology  Diarrhea/Constipation:No  Changes in menses: regular  Dysphagia or Shortness of breath:No  Tremors:No  Changes in weight: stable  Wt Readings from Last 2 Encounters:   11/13/23 107.2 kg (236 lb 6.4 oz)   11/07/23 106.6 kg (235 lb)      PMH/PSH:  Past Medical History:   Diagnosis Date    Allergic rhinitis, cause unspecified     Allergic rhinitis    Aneurysm (H24)     3.6 mm aneurysm off the ophthalmic L IC art  Seen CT, MRI    Cervicalgia 3/17/2011    Encounter for other general counseling or advice on contraception 9/12/2007      Diagnosis updated by automated process. Provider to review and confirm.    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     Past Surgical History:   Procedure Laterality Date    COLONOSCOPY N/A 12/1/2021    Procedure: Unable to complete EGD; COLONOSCOPY;  Surgeon: Gume Douglas MD;  Location:  GI    ESOPHAGOSCOPY, GASTROSCOPY, DUODENOSCOPY (EGD), COMBINED N/A 12/1/2021    Procedure: ESOPHAGOGASTRODUODENOSCOPY (EGD) ATTEMPTED BUT UNABLE TO COMPLETE, needs deeper sedation; patient too gaggy;  Surgeon: Gume Douglas MD;  Location:  GI    NO HISTORY OF SURGERY       Family Hx:  Family History   Problem Relation Age of Onset    Lipids Father     Neurologic Disorder Father         epilepsy    Heart Disease Father 47        MI    Hypertension Father     Hyperlipidemia Father     Lipids Sister     Hyperlipidemia Sister     Breast Cancer Paternal Grandmother         3rd generation     Breast Cancer Paternal Aunt     Diabetes Mother         gestational     Asthma Mother     Diabetes Paternal Grandfather     Other Cancer Maternal Grandfather         Skin    Hyperlipidemia Paternal Half-Sister     Breast Cancer Other     Other Cancer Maternal Grandmother         Lung    Ovarian Cancer Maternal Grandmother     Lung Cancer Maternal Grandmother     Diabetes Maternal Uncle     Diabetes Maternal Uncle     Colon Cancer No family hx of                Social  Hx:  Social History     Socioeconomic History    Marital status: Single     Spouse name: none    Number of children: Not on file    Years of education: Not on file    Highest education level: Not on file   Occupational History    Occupation:  at [employer]      Employer: OTHER   Tobacco Use    Smoking status: Former     Packs/day: 0.50     Years: 2.00     Additional pack years: 0.00     Total pack years: 1.00     Types: Cigarettes     Start date:      Quit date: 2001     Years since quittin.8    Smokeless tobacco: Never   Vaping Use    Vaping Use: Never used   Substance and Sexual Activity    Alcohol use: Yes     Comment: 1 glass of wine few times per month    Drug use: No    Sexual activity: Not Currently     Partners: Male     Birth control/protection: Condom   Other Topics Concern     Service No    Blood Transfusions No    Caffeine Concern No    Occupational Exposure No    Hobby Hazards No    Sleep Concern No    Stress Concern No    Weight Concern Yes     Comment: Would like to lose more weight    Special Diet No    Back Care No    Exercise Yes     Comment: Moderate    Bike Helmet No    Seat Belt Yes     Comment: always     Self-Exams Yes     Comment: SBE encouraged monthly     Parent/sibling w/ CABG, MI or angioplasty before 65F 55M? Yes   Social History Narrative    Calcium - 2-3 dairy servings/ day     Menarche: at age 12    Menses: regular      Social Determinants of Health     Financial Resource Strain: Low Risk  (10/4/2023)    Financial Resource Strain     Within the past 12 months, have you or your family members you live with been unable to get utilities (heat, electricity) when it was really needed?: No   Food Insecurity: Low Risk  (10/4/2023)    Food Insecurity     Within the past 12 months, did you worry that your food would run out before you got money to buy more?: No     Within the past 12 months, did the food you bought just not last and you didn t have  money to get more?: No   Transportation Needs: Low Risk  (10/4/2023)    Transportation Needs     Within the past 12 months, has lack of transportation kept you from medical appointments, getting your medicines, non-medical meetings or appointments, work, or from getting things that you need?: No   Physical Activity: Not on file   Stress: Not on file   Social Connections: Not on file   Interpersonal Safety: Not on file   Housing Stability: Low Risk  (10/4/2023)    Housing Stability     Do you have housing? : Yes     Are you worried about losing your housing?: No          MEDICATIONS:  has a current medication list which includes the following prescription(s): aspirin, famotidine, loratadine, metoprolol succinate er, multivitamin gummies womens, probiotic product, vitamin d, zinc gluconate, and levalbuterol.    Review of Systems  10 point ROS neg other than the symptoms noted above in the HPI.    Physical Exam   VS: LMP 10/26/2023 (Exact Date)   GENERAL: healthy, alert and no distress  EYES: Eyes grossly normal to inspection, conjunctivae and sclerae normal  ENT: no nose swelling, nasal discharge.  Thyroid: no apparent thyroid nodules  RESP: no audible wheeze, cough, or visible cyanosis.  No visible retractions or increased work of breathing.  Able to speak fully in complete sentences.  ABDO: not evaluated.  EXTREMITIES: no hand tremors.  NEURO: Cranial nerves grossly intact, mentation intact and speech normal  SKIN: No apparent skin lesions, rash or edema seen   PSYCH: mentation appears normal, affect normal/bright, judgement and insight intact, normal speech and appearance well-groomed    LABS:  TFTs:  TSH   Date Value Ref Range Status   11/21/2023 0.42 0.30 - 4.20 uIU/mL Final   09/22/2020 0.72 0.40 - 4.00 mU/L Final     Thyroid Ultrasound 2023:  IMPRESSION:  1.  Bilateral thyroid nodules as documented above. The inferior left thyroid nodule demonstrates minimal interval enlargement. Consider repeat biopsy or close  imaging surveillance.    Thyroid FNA 2020:  Thyroid, left , ultrasound guided fine needle aspiration:   Benign   Consistent with a benign nodule (includes adenomatoid nodule, colloid   nodule, etc.)     FNA of left dominant nodule 11/2023:    All pertinent notes, labs, and images personally reviewed by me.     A/P  Ms.Dena RAMON Valles is a 38 year old here for the evaluation of thyroid nodule:  #1 Thyroid Nodule:  FNA of left dominant nodule consistent with AUS   no FH of thyroid cancer  No history of radiation  No compressive s/s  Thyroid labs in acceptable range.  She is not on thyroid hormone replacement.  No other major risk factors.   Plan:  Discussed diagnosis, pathophysiology, management and treatment options of condition with pt.  Discussed diagnosis of atypia of undetermined significance in general.  Discussed next possible management including repeating biopsy, molecular marker testing and surgical removal.  Plan for molecular marker testing.  Follow up after that.          Discussed possible outcomes of biopsy including possible benign, possible malignancy and possible AUS. If AUS indication for molecular marker testing.  Discussed possible compressive symptoms and signs to watch for.  Discussed that Thyroid nodules are common and are frequently benign. Data suggest that the prevalence of palpable thyroid nodules is 3% to 7% in [place]; the prevalence is as high as 50% based on ultrasonography (US) or autopsy data. All patients with a palpable thyroid nodule, however, should undergo US examination. US-guided FNA (US-FNA) is recommended for nodules ?10 mm; US-FNA is suggested for nodules <10 mm only if clinical information or US features are suspicious.  The frequency of thyroid nodules in general population was discussed. Also discussed possibility of malignancy, potential for thyroid autonomy.     Causes of thyroid Nodules: Benign (Multinodular goiter, Hashimoto s thyroiditis, Simple or hemorrhagic  cysts, Follicular adenomas, Subacute thyroiditis) or Malignant(Papillary carcinoma, Follicular carcinoma, Hürthle cell carcinoma, Medullary carcinoma, Anaplastic carcinoma, Primary thyroid lymphoma, or Metastatic malignant lesion).    MultiNodule:  The risk of cancer is not significantly higher in palpable solitary thyroid nodules than in multinodular lesions or in nodules in diffuse goiters. In multinodular thyroid glands, the cytologic sampling should be focused on lesions characterized by suspicious US features rather than on larger or clinically dominant nodules.    Cyst:  Most complex thyroid nodules with a dominant fluid component are benign. USFNA, however, should always be performed because the rare papillary thyroid carcinoma (PTC) can be cystic. An unsatisfactory (nondiagnostic) specimen usually results from a cystic nodule that yields few or no follicular cells. Reaspiration yields satisfactory results in 50% of cases.    All questions were answered.  The patient indicates understanding of the above issues and agrees with the plan set forth.    Follow-up:  As noted in AVS    Ashia Eaton MD  Endocrinology   [facility]    Cc: Brittany Miles    Addendum to above note and clinic visit:    Labs reviewed.    See result note/telephone encounter.

## 2023-12-07 ENCOUNTER — PRE VISIT (OUTPATIENT)
Dept: OTOLARYNGOLOGY | Facility: CLINIC | Age: 38
End: 2023-12-07

## 2023-12-13 ENCOUNTER — NURSE TRIAGE (OUTPATIENT)
Dept: FAMILY MEDICINE | Facility: CLINIC | Age: 38
End: 2023-12-13
Payer: COMMERCIAL

## 2023-12-13 NOTE — TELEPHONE ENCOUNTER
"S-(situation): Pt calling about numbness    B-(background): Pt had a biopsy on 11/21/23 of thyroid.     A-(assessment): Since then she has had numbness of the skin on the neck and down on the chest. She called radiology and was advised to ice biopsy area. She then say endocrinology who advised follow up with primary.     R-(recommendations): Advised appointment in next 3 days however Pt is going out of town and requests appointment next week.       Reason for Disposition   Nursing judgment    Additional Information   Negative: Sounds like a life-threatening emergency to the triager   Negative: Information only call about a Well Adult (no illness or injury)   Negative: Caller can't be reached by phone   Negative: Nursing judgment   Negative: Nursing judgment   Negative: Nursing judgment   Negative: Nursing judgment   Negative: Nursing judgment   Negative: Nursing judgment   Negative: Patient wants to be seen   Negative: Nursing judgment   Negative: Nursing judgment   Negative: Nursing judgment    Answer Assessment - Initial Assessment Questions  1. REASON FOR CALL: \"What is your main concern right now?\"      Pt had biopsy and now has numbness of her skin on left side.   2. ONSET: \"When did the numbness start?\"      With biopsy on 11/21/23  3. SEVERITY: \"How bad is the numbness?\"      No tingling but unable to feel sensation on skin on left neck chest area  4. FEVER: \"Do you have a fever?\"      no  5. OTHER SYMPTOMS: \"Do you have any other new symptoms?\"      Slight bruise still and slight welling of area  6. TREATMENTS AND RESPONSE: \"What have you done so far to try to make this better? What medicines have you used?\"      Ice   7. PREGNANCY: \"Is there any chance you are pregnant?\" \"When was your last menstrual period?\"      N/A    Protocols used: No Protocol [id number]    "

## 2023-12-13 NOTE — PROGRESS NOTES
Assessment & Plan     Sayda was seen today for palpitations, lab request and eye problem.    Diagnoses and all orders for this visit:    Palpitations  S/P ED visit on 5/9/22 for acute palpitations (onset after lack of sleep the night before).    Noted improvement, no return of palpitations.   Continue Toprol XL 50 mg daily.       Chalazion of right lower eyelid   Chalazion vs. hordeolum  -     erythromycin (ROMYCIN) 5 MG/GM ophthalmic ointment; Place 0.5 inches into the right eye 2 times daily for 7 days  -     With no improvement or worsening recommend further consultation with ophthalmologist.      Iron deficiency anemia, unspecified iron deficiency anemia type  Surveillance CBC and iron levels.    -     CBC with platelets  -     Ferritin  -     Iron and iron binding capacity          Return in about 4 weeks (around 6/10/2022) for Physical Exam.      Brittany Miles, [facility]   Sayda is a 36 year old who presents for the following health issues.          History of Present Illness       Reason for visit:  Follow up for heart palpitations and iron levels  Symptom onset:  3-7 days ago  Symptoms include:  Heart palpitations  Symptom intensity:  Moderate  Symptom progression:  Improving  Had these symptoms before:  Yes    She eats 0-1 servings of fruits and vegetables daily.She consumes 3 sweetened beverage(s) daily.She exercises with enough effort to increase her heart rate 9 or less minutes per day.  She exercises with enough effort to increase her heart rate 3 or less days per week.   She is taking medications regularly.       ED/ Followup:    Facility:  [facility]  Date of visit: 05/09/2022  Reason for visit: Palpitations       Volunteered for daughter's jie prom and was up until 4 a.m. Worsening palpitations went to ED for further evaluation.    No further issues with palpitations.  Palpitations have resolved.         EYE  History of present illness: Patient with persistent right hip and groin pain    X-rays are relatively unremarkable with a small pincer spur    She has known severe spine stenosis and degenerative changes most noticeably at 3 4 and 5    Recent hip MRI again shows minimal arthritic change a little bit abductor muscle and hamstring degeneration obviously back was limited to scanned on the hip MRI but does show some obvious degenerative changes    Physical exam:    General: No acute distress or breathing difficulty or discomfort, pleasant and cooperative with the examination.    Extremities: The affected right hip was examined and inspected.  There was tenderness to touch along the groin and over the lateral aspect of the hip over the bursal area.  Hip joint occasionally displayed catching, locking and mechanical symptoms.    The skin was intact without breakdown or open wound.  Old incisions of present were all healed.  There was mild crepitus seen with internal and external rotation and range of motion without evidence of gross instability.    Inspection of the low back showed normal curvature integrity of the skin.  The strength and stability of the low back and ligaments were within normal limits.    There was a normal straight leg raise with no foot drop, numbness or tingling in the bilateral lower extremities.  Sensation, reflexes and pulses in the foot and ankle are preserved and present.  There was no obvious effusion.  Range of motion showed flexion to 90 degrees, abduction to 20 degrees, external rotation to 5 degrees and 0 degrees of internal rotation.  The patient had the ability to bear weight but with discomfort.  The patient's gait Antalgic and secondary to discomfort    Diagnostic studies: We reviewed the hip MRI right side showing again some mild arthritis and some abductor muscle degeneration and tearing and hamstring inflammation    Impression: Right hip mild arthritis but continued hip groin pain  "PROBLEM -   RIGHT x 3 weeks, Stye?  Tried to extract it and that helped initially, but has persisted.     Slightly tender to palpation.     LABS REQUESTED - Iron & Platelets  No iron supplement for the past 3 weeks.      Per RN note below:   \"She lives in [facility].  Is seen at [facility].  No available appointment.  She was instructed needs to be seen; she will be seen in urgent care near her today.  She states will check and see where closest urgent care is near her in her network.\"     Called and spoke with patient.      She is sitting in ER in [place].  wanted her to be seen in ER. BP elevated. Would be good to still follow up on Friday. Patient will plan to keep appointment on Friday. Patient stated an understanding and agreed with plan.      Felipa Machado RN  [facility]                  Review of Systems   Constitutional, HEENT, cardiovascular, pulmonary, gi and gu systems are negative, except as otherwise noted.      Objective    /88   Pulse 92   Temp 98.3  F (36.8  C) (Tympanic)   Ht 1.657 m (5' 5.25\")   Wt 107.5 kg (237 lb)   LMP 05/08/2022 (Exact Date)   SpO2 96%   Breastfeeding No   BMI 39.14 kg/m    Body mass index is 39.14 kg/m .  Physical Exam     GENERAL: healthy, alert and no distress  EYES: right lower eyelid with mildly erythematous lesion, no surrounding tissue swelling, no drainage  RESP: lungs clear to auscultation - no rales, rhonchi or wheezes  CV: regular rate and rhythm, normal S1 S2, no S3 or S4, no murmur, click or rub, no peripheral edema   PSYCH: mentation appears normal, affect normal/bright            " mild pincer spur is noted on x-ray now for fluoroscopy guided injection    Plan: Will see her back after fluoroscopy guided injection hopefully at [facility]    I will see her back in 6 to 8 weeks and decide how to proceed

## 2023-12-18 LAB — SCANNED LAB RESULT: NORMAL

## 2023-12-19 ENCOUNTER — OFFICE VISIT (OUTPATIENT)
Dept: FAMILY MEDICINE | Facility: CLINIC | Age: 38
End: 2023-12-19
Payer: COMMERCIAL

## 2023-12-19 VITALS
DIASTOLIC BLOOD PRESSURE: 86 MMHG | SYSTOLIC BLOOD PRESSURE: 132 MMHG | WEIGHT: 235 LBS | HEART RATE: 96 BPM | BODY MASS INDEX: 39.15 KG/M2 | TEMPERATURE: 98.2 F | OXYGEN SATURATION: 99 % | RESPIRATION RATE: 18 BRPM | HEIGHT: 65 IN

## 2023-12-19 DIAGNOSIS — R20.0 NUMBNESS: Primary | ICD-10-CM

## 2023-12-19 PROCEDURE — 99213 OFFICE O/P EST LOW 20 MIN: CPT | Performed by: NURSE PRACTITIONER

## 2023-12-19 ASSESSMENT — ASTHMA QUESTIONNAIRES
QUESTION_5 LAST FOUR WEEKS HOW WOULD YOU RATE YOUR ASTHMA CONTROL: SOMEWHAT CONTROLLED
QUESTION_4 LAST FOUR WEEKS HOW OFTEN HAVE YOU USED YOUR RESCUE INHALER OR NEBULIZER MEDICATION (SUCH AS ALBUTEROL): TWO OR THREE TIMES PER WEEK
ACT_TOTALSCORE: 18
QUESTION_2 LAST FOUR WEEKS HOW OFTEN HAVE YOU HAD SHORTNESS OF BREATH: THREE TO SIX TIMES A WEEK
QUESTION_3 LAST FOUR WEEKS HOW OFTEN DID YOUR ASTHMA SYMPTOMS (WHEEZING, COUGHING, SHORTNESS OF BREATH, CHEST TIGHTNESS OR PAIN) WAKE YOU UP AT NIGHT OR EARLIER THAN USUAL IN THE MORNING: NOT AT ALL
QUESTION_1 LAST FOUR WEEKS HOW MUCH OF THE TIME DID YOUR ASTHMA KEEP YOU FROM GETTING AS MUCH DONE AT WORK, SCHOOL OR AT HOME: A LITTLE OF THE TIME
ACT_TOTALSCORE: 18

## 2023-12-19 ASSESSMENT — ENCOUNTER SYMPTOMS: NUMBNESS: 1

## 2023-12-19 NOTE — PROGRESS NOTES
"  Assessment & Plan     Numbness  S/P FNA of thyroid nodule on 11/21/23  In light of negative exam and no concurrent neurological symptoms will continue to monitor. Encouraged to return if numbness increases or new symptoms develop. Reassurance that numbness can take time to full resolve.       BMI:   Estimated body mass index is 39.11 kg/m  as calculated from the following:    Height as of this encounter: 1.651 m (5' 5\").    Weight as of this encounter: 106.6 kg (235 lb).       Return in about 5 months (around 5/19/2024) for Preventative Visit .    Radha Jacob DNP Student at the [facility]    I was present with the student who participated in the service and in the documentation of the note, which I have reviewed and verified. The history, reviews of systems, objective data, and assessment/plan were completed by myself.    Brittany Miles, CHE [facility] [place]        Subjective   Sayda is a 38 year old, presenting for the following health issues:  Numbness    Sayda is a 38 year old female with a PMH of stroke, brain aneurysm, vertebral artery dissection, migraines, allergic rhinitis, asthma, and heart palpitations is here for numbness post thyroid biopsy and to discuss results.     Numbness started right after biopsy and has stayed stable, not worsening, and no improvement. Area extends from back of neck down to top of shoulder down chest stopping at sternum. She endorses sensation but only about 20% of her normal.     Biopsy spot was tender at first that has improved. Clinic encouraged heat and ice, but this has not resulted in any improvement.     Reviewed note from FRANK Eaton that notes molecular marker testing was negative indicating a low risk of cancer. US in 6 months        12/19/2023     1:41 PM   Additional Questions   Roomed by Diane LAMB       History of Present Illness       Reason for visit:  Numbness post thyroid biopsy  Symptom onset:  3-4 weeks ago  Symptoms include:  Numbness " "and loss of feeling in skin on chest and neck  Symptom intensity:  Moderate  Symptom progression:  Staying the same  Had these symptoms before:  No  What makes it worse:  No  What makes it better:  No    She eats 2-3 servings of fruits and vegetables daily.She consumes 1 sweetened beverage(s) daily.She exercises with enough effort to increase her heart rate 30 to 60 minutes per day.  She exercises with enough effort to increase her heart rate 3 or less days per week.   She is taking medications regularly.     S-(situation): Pt calling about numbness     B-(background): Pt had a biopsy on 11/21/23 of thyroid- test was abnormal -molecular marker testing- would like to discuss results.     A-(assessment): Since then she has had numbness of the skin on the neck and down on the chest. She called radiology and was advised to ice biopsy area. She then say endocrinology who advised follow up with primary.      R-(recommendations): Advised appointment in next 3 days however Pt is going out of town and requests appointment next week.            Review of Systems   Neurological:  Positive for numbness.            Objective    BP (!) 132/94   Pulse 96   Temp 98.2  F (36.8  C)   Resp 18   Ht 1.651 m (5' 5\")   Wt 106.6 kg (235 lb)   LMP 11/23/2023 (Exact Date)   SpO2 99%   BMI 39.11 kg/m    Body mass index is 39.11 kg/m .    Physical Exam  Constitutional:       Appearance: Normal appearance. She is not ill-appearing.   Neck:      Comments: Mild tenderness to palpation when palpating to left of incision. There is no changes to skin color or masses noted that would indicate a hematoma  Cardiovascular:      Rate and Rhythm: Normal rate and regular rhythm.   Pulmonary:      Effort: Pulmonary effort is normal. No respiratory distress.      Breath sounds: Normal breath sounds. No stridor. No wheezing.   Musculoskeletal:      Cervical back: Neck supple. Tenderness present. No edema, erythema or rigidity.   Lymphadenopathy:      " Cervical: No cervical adenopathy.   Skin:     General: Skin is warm.             Comments: Area in red the area of reduced sensation   Neurological:      Mental Status: She is alert.      Motor: Motor function is intact. No weakness.

## 2023-12-19 NOTE — RESULT ENCOUNTER NOTE
Sayda    Recently done endocrinology lab test/ imaging test showed:  Molecular marker testing was negative. That means the risk of cancer is small and it is likely benign.  Plan to continue to monitor and recheck thyroid ultrasound in 6 months.    Here is a copy for your records.    Follow up as discussed in last clinic visit.    Please call endocrinology clinic ( [phone]) if questions.    Ashia Eaton MD  Endocrinology   [facility]  December 19, 2023

## 2024-01-31 ENCOUNTER — OFFICE VISIT (OUTPATIENT)
Dept: CARDIOLOGY | Facility: CLINIC | Age: 39
End: 2024-01-31
Payer: COMMERCIAL

## 2024-01-31 ENCOUNTER — ORDERS ONLY (AUTO-RELEASED) (OUTPATIENT)
Dept: CARDIOLOGY | Facility: CLINIC | Age: 39
End: 2024-01-31

## 2024-01-31 VITALS
HEART RATE: 70 BPM | BODY MASS INDEX: 39.34 KG/M2 | HEIGHT: 65 IN | DIASTOLIC BLOOD PRESSURE: 88 MMHG | WEIGHT: 236.1 LBS | OXYGEN SATURATION: 99 % | SYSTOLIC BLOOD PRESSURE: 136 MMHG

## 2024-01-31 DIAGNOSIS — R00.2 PALPITATIONS: Primary | ICD-10-CM

## 2024-01-31 DIAGNOSIS — R00.2 PALPITATIONS: ICD-10-CM

## 2024-01-31 PROCEDURE — 93000 ELECTROCARDIOGRAM COMPLETE: CPT | Performed by: INTERNAL MEDICINE

## 2024-01-31 NOTE — Clinical Note
1/31/2024    Brittany Miles, APRN CNP  [address] [id number]    RE: Sayda Valles       Dear Colleague,     I had the pleasure of seeing Sayda Valles in the [facility].  No notes on file    Thank you for allowing me to participate in the care of your patient.      Sincerely,     Aaron Clemente MD, MD     [facility]  cc:   Referred Self, MD  No address on file

## 2024-02-22 PROCEDURE — 93244 EXT ECG>48HR<7D REV&INTERPJ: CPT | Performed by: INTERNAL MEDICINE

## 2024-05-15 DIAGNOSIS — I10 BENIGN ESSENTIAL HYPERTENSION: ICD-10-CM

## 2024-05-15 RX ORDER — METOPROLOL SUCCINATE 50 MG/1
50 TABLET, EXTENDED RELEASE ORAL DAILY
Qty: 30 TABLET | Refills: 0 | Status: SHIPPED | OUTPATIENT
Start: 2024-05-15 | End: 2024-06-13

## 2024-05-15 NOTE — TELEPHONE ENCOUNTER
Spoke with patient, attached different pharmacy, please send script to this one.  Routing to PCP.    Diane Diallo MA

## 2024-05-15 NOTE — TELEPHONE ENCOUNTER
Unable to find requested pharmacy.   Please call patient and confirm requested pharmacy.    - [place], CNP

## 2024-06-01 ENCOUNTER — TRANSFERRED RECORDS (OUTPATIENT)
Dept: MULTI SPECIALTY CLINIC | Facility: CLINIC | Age: 39
End: 2024-06-01

## 2024-06-01 LAB — PAP SMEAR - HIM PATIENT REPORTED: NORMAL

## 2024-06-13 DIAGNOSIS — I10 BENIGN ESSENTIAL HYPERTENSION: ICD-10-CM

## 2024-06-13 RX ORDER — METOPROLOL SUCCINATE 50 MG/1
50 TABLET, EXTENDED RELEASE ORAL DAILY
Qty: 90 TABLET | Refills: 1 | Status: SHIPPED | OUTPATIENT
Start: 2024-06-13 | End: 2024-06-14

## 2024-06-13 SDOH — HEALTH STABILITY: PHYSICAL HEALTH: ON AVERAGE, HOW MANY DAYS PER WEEK DO YOU ENGAGE IN MODERATE TO STRENUOUS EXERCISE (LIKE A BRISK WALK)?: 3 DAYS

## 2024-06-13 SDOH — HEALTH STABILITY: PHYSICAL HEALTH: ON AVERAGE, HOW MANY MINUTES DO YOU ENGAGE IN EXERCISE AT THIS LEVEL?: 20 MIN

## 2024-06-13 ASSESSMENT — ASTHMA QUESTIONNAIRES
QUESTION_1 LAST FOUR WEEKS HOW MUCH OF THE TIME DID YOUR ASTHMA KEEP YOU FROM GETTING AS MUCH DONE AT WORK, SCHOOL OR AT HOME: NONE OF THE TIME
ACT_TOTALSCORE: 23
QUESTION_4 LAST FOUR WEEKS HOW OFTEN HAVE YOU USED YOUR RESCUE INHALER OR NEBULIZER MEDICATION (SUCH AS ALBUTEROL): NOT AT ALL
QUESTION_2 LAST FOUR WEEKS HOW OFTEN HAVE YOU HAD SHORTNESS OF BREATH: ONCE OR TWICE A WEEK
ACT_TOTALSCORE: 23
QUESTION_3 LAST FOUR WEEKS HOW OFTEN DID YOUR ASTHMA SYMPTOMS (WHEEZING, COUGHING, SHORTNESS OF BREATH, CHEST TIGHTNESS OR PAIN) WAKE YOU UP AT NIGHT OR EARLIER THAN USUAL IN THE MORNING: NOT AT ALL
QUESTION_5 LAST FOUR WEEKS HOW WOULD YOU RATE YOUR ASTHMA CONTROL: WELL CONTROLLED

## 2024-06-13 ASSESSMENT — SOCIAL DETERMINANTS OF HEALTH (SDOH): HOW OFTEN DO YOU GET TOGETHER WITH FRIENDS OR RELATIVES?: TWICE A WEEK

## 2024-06-14 ENCOUNTER — OFFICE VISIT (OUTPATIENT)
Dept: FAMILY MEDICINE | Facility: CLINIC | Age: 39
End: 2024-06-14
Payer: COMMERCIAL

## 2024-06-14 VITALS
RESPIRATION RATE: 18 BRPM | HEIGHT: 65 IN | HEART RATE: 84 BPM | TEMPERATURE: 97.8 F | BODY MASS INDEX: 39.15 KG/M2 | OXYGEN SATURATION: 98 % | SYSTOLIC BLOOD PRESSURE: 128 MMHG | DIASTOLIC BLOOD PRESSURE: 94 MMHG | WEIGHT: 235 LBS

## 2024-06-14 DIAGNOSIS — J45.30 MILD PERSISTENT ASTHMA WITHOUT COMPLICATION: ICD-10-CM

## 2024-06-14 DIAGNOSIS — F41.9 ANXIETY: ICD-10-CM

## 2024-06-14 DIAGNOSIS — Z13.1 SCREENING FOR DIABETES MELLITUS: ICD-10-CM

## 2024-06-14 DIAGNOSIS — Z01.84 IMMUNITY STATUS TESTING: ICD-10-CM

## 2024-06-14 DIAGNOSIS — I10 BENIGN ESSENTIAL HYPERTENSION: ICD-10-CM

## 2024-06-14 DIAGNOSIS — Z00.00 ROUTINE GENERAL MEDICAL EXAMINATION AT A HEALTH CARE FACILITY: Primary | ICD-10-CM

## 2024-06-14 DIAGNOSIS — Z13.29 SCREENING FOR THYROID DISORDER: ICD-10-CM

## 2024-06-14 DIAGNOSIS — L30.9 DERMATITIS: ICD-10-CM

## 2024-06-14 DIAGNOSIS — Z13.220 SCREENING FOR LIPID DISORDERS: ICD-10-CM

## 2024-06-14 DIAGNOSIS — Z12.31 SCREENING MAMMOGRAM FOR BREAST CANCER: ICD-10-CM

## 2024-06-14 DIAGNOSIS — Z13.0 SCREENING FOR DEFICIENCY ANEMIA: ICD-10-CM

## 2024-06-14 DIAGNOSIS — R22.1 LUMP IN NECK: ICD-10-CM

## 2024-06-14 LAB
ALBUMIN SERPL BCG-MCNC: 4.3 G/DL (ref 3.5–5.2)
ALP SERPL-CCNC: 50 U/L (ref 40–150)
ALT SERPL W P-5'-P-CCNC: 19 U/L (ref 0–50)
ANION GAP SERPL CALCULATED.3IONS-SCNC: 10 MMOL/L (ref 7–15)
AST SERPL W P-5'-P-CCNC: 19 U/L (ref 0–45)
BILIRUB SERPL-MCNC: 0.5 MG/DL
BUN SERPL-MCNC: 10.3 MG/DL (ref 6–20)
CALCIUM SERPL-MCNC: 8.9 MG/DL (ref 8.6–10)
CHLORIDE SERPL-SCNC: 105 MMOL/L (ref 98–107)
CHOLEST SERPL-MCNC: 161 MG/DL
CREAT SERPL-MCNC: 0.7 MG/DL (ref 0.51–0.95)
DEPRECATED HCO3 PLAS-SCNC: 23 MMOL/L (ref 22–29)
EGFRCR SERPLBLD CKD-EPI 2021: >90 ML/MIN/1.73M2
ERYTHROCYTE [DISTWIDTH] IN BLOOD BY AUTOMATED COUNT: 12.8 % (ref 10–15)
FASTING STATUS PATIENT QL REPORTED: YES
FASTING STATUS PATIENT QL REPORTED: YES
FERRITIN SERPL-MCNC: 26 NG/ML (ref 6–175)
GLUCOSE SERPL-MCNC: 93 MG/DL (ref 70–99)
HBA1C MFR BLD: 5.1 % (ref 0–5.6)
HBV SURFACE AB SERPL IA-ACNC: <3.5 M[IU]/ML
HBV SURFACE AB SERPL IA-ACNC: NONREACTIVE M[IU]/ML
HCT VFR BLD AUTO: 39.7 % (ref 35–47)
HDLC SERPL-MCNC: 40 MG/DL
HGB BLD-MCNC: 13.3 G/DL (ref 11.7–15.7)
IRON BINDING CAPACITY (ROCHE): 271 UG/DL (ref 240–430)
IRON SATN MFR SERPL: 21 % (ref 15–46)
IRON SERPL-MCNC: 58 UG/DL (ref 37–145)
LDLC SERPL CALC-MCNC: 96 MG/DL
MCH RBC QN AUTO: 27.1 PG (ref 26.5–33)
MCHC RBC AUTO-ENTMCNC: 33.5 G/DL (ref 31.5–36.5)
MCV RBC AUTO: 81 FL (ref 78–100)
NONHDLC SERPL-MCNC: 121 MG/DL
PLATELET # BLD AUTO: 286 10E3/UL (ref 150–450)
POTASSIUM SERPL-SCNC: 4.2 MMOL/L (ref 3.4–5.3)
PROT SERPL-MCNC: 6.8 G/DL (ref 6.4–8.3)
RBC # BLD AUTO: 4.91 10E6/UL (ref 3.8–5.2)
SODIUM SERPL-SCNC: 138 MMOL/L (ref 135–145)
TRIGL SERPL-MCNC: 125 MG/DL
TSH SERPL DL<=0.005 MIU/L-ACNC: 1.31 UIU/ML (ref 0.3–4.2)
WBC # BLD AUTO: 5.7 10E3/UL (ref 4–11)

## 2024-06-14 PROCEDURE — 84443 ASSAY THYROID STIM HORMONE: CPT | Performed by: NURSE PRACTITIONER

## 2024-06-14 PROCEDURE — 99214 OFFICE O/P EST MOD 30 MIN: CPT | Mod: 25 | Performed by: NURSE PRACTITIONER

## 2024-06-14 PROCEDURE — 85027 COMPLETE CBC AUTOMATED: CPT | Performed by: NURSE PRACTITIONER

## 2024-06-14 PROCEDURE — 96127 BRIEF EMOTIONAL/BEHAV ASSMT: CPT | Performed by: NURSE PRACTITIONER

## 2024-06-14 PROCEDURE — 99395 PREV VISIT EST AGE 18-39: CPT | Performed by: NURSE PRACTITIONER

## 2024-06-14 PROCEDURE — 82728 ASSAY OF FERRITIN: CPT | Performed by: NURSE PRACTITIONER

## 2024-06-14 PROCEDURE — 86706 HEP B SURFACE ANTIBODY: CPT | Performed by: NURSE PRACTITIONER

## 2024-06-14 PROCEDURE — 36415 COLL VENOUS BLD VENIPUNCTURE: CPT | Performed by: NURSE PRACTITIONER

## 2024-06-14 PROCEDURE — 83540 ASSAY OF IRON: CPT | Performed by: NURSE PRACTITIONER

## 2024-06-14 PROCEDURE — 83550 IRON BINDING TEST: CPT | Performed by: NURSE PRACTITIONER

## 2024-06-14 PROCEDURE — 80053 COMPREHEN METABOLIC PANEL: CPT | Performed by: NURSE PRACTITIONER

## 2024-06-14 PROCEDURE — 83036 HEMOGLOBIN GLYCOSYLATED A1C: CPT | Performed by: NURSE PRACTITIONER

## 2024-06-14 PROCEDURE — 80061 LIPID PANEL: CPT | Performed by: NURSE PRACTITIONER

## 2024-06-14 RX ORDER — LEVALBUTEROL TARTRATE 45 UG/1
2 AEROSOL, METERED ORAL EVERY 6 HOURS PRN
Qty: 15 G | Refills: 1 | Status: SHIPPED | OUTPATIENT
Start: 2024-06-14

## 2024-06-14 RX ORDER — AZELASTINE 1 MG/ML
1 SPRAY, METERED NASAL 2 TIMES DAILY
COMMUNITY

## 2024-06-14 RX ORDER — HYDROXYZINE HYDROCHLORIDE 25 MG/1
25 TABLET, FILM COATED ORAL EVERY 6 HOURS PRN
Qty: 30 TABLET | Refills: 1 | Status: SHIPPED | OUTPATIENT
Start: 2024-06-14

## 2024-06-14 RX ORDER — TRIAMCINOLONE ACETONIDE 1 MG/G
OINTMENT TOPICAL 2 TIMES DAILY
Qty: 15 G | Refills: 0 | Status: SHIPPED | OUTPATIENT
Start: 2024-06-14

## 2024-06-14 RX ORDER — METOPROLOL SUCCINATE 50 MG/1
50 TABLET, EXTENDED RELEASE ORAL DAILY
Qty: 90 TABLET | Refills: 3 | Status: SHIPPED | OUTPATIENT
Start: 2024-06-14 | End: 2024-07-22

## 2024-06-14 NOTE — RESULT ENCOUNTER NOTE
Dear Sayda,     -Normal red blood cell (hgb) levels, normal white blood cell count and normal platelet levels.  -A1C (diabetic test) is normal and indicates that your blood sugar has been in a normal range the last 3 months.      Thank you,     Brittany Miles, CHE, CNP  [facility]    If you have further questions about the interpretation of your labs, [email or web address] is a good website to check out for further information.

## 2024-06-14 NOTE — PROGRESS NOTES
Preventive Care Visit  [facility] PRIOR CHE Abreu CNP, Family Medicine  Jun 14, 2024    Assessment & Plan     Sayda was seen today for physical.    Diagnoses and all orders for this visit:    Routine general medical examination at a health care facility  -     REVIEW OF HEALTH MAINTENANCE PROTOCOL ORDERS  -     PRIMARY CARE FOLLOW-UP SCHEDULING; Future    Screening mammogram for breast cancer  -     MA Screen Bilateral w/Ata; Future    Screening for thyroid disorder  -     TSH with free T4 reflex    Screening for lipid disorders  -     Lipid panel reflex to direct LDL Fasting    Immunity status testing  -     Hepatitis B Surface Antibody    Screening for diabetes mellitus  -     Hemoglobin A1c  -     Comprehensive metabolic panel (BMP + Alb, Alk Phos, ALT, AST, Total. Bili, TP)    Screening for deficiency anemia  -     CBC with platelets  -     Ferritin  -     Iron and iron binding capacity    Dermatitis  -     triamcinolone (KENALOG) 0.1 % external ointment; Apply topically 2 times daily    Mild persistent asthma without complication      6/29/2023    10:56 AM 12/19/2023     4:27 PM 6/13/2024     4:39 PM   ACT Total Scores   ACT TOTAL SCORE (Goal Greater than or Equal to 20) 22 18 23   In the past 12 months, how many times did you visit the emergency room for your asthma without being admitted to the hospital? 0 0 0   In the past 12 months, how many times were you hospitalized overnight because of your asthma? 0 0 0   Stable, well controlled.    -     levalbuterol (XOPENEX HFA) 45 MCG/ACT inhaler; Inhale 2 puffs into the lungs every 6 hours as needed for shortness of breath or wheezing    Anxiety  Intermittent use, prior to upcoming dental appointment.    -     hydrOXYzine HCl (ATARAX) 25 MG tablet; Take 1 tablet (25 mg) by mouth every 6 hours as needed for anxiety    Benign essential hypertension  Stable, normal home blood pressures.    -     metoprolol succinate ER (TOPROL XL) 50  "MG 24 hr tablet; Take 1 tablet (50 mg) by mouth daily    Lump in neck  Plan further evaluation with ultrasound.    -     US Head Neck Soft Tissue; Future            BMI  Estimated body mass index is 39.11 kg/m  as calculated from the following:    Height as of this encounter: 1.651 m (5' 5\").    Weight as of this encounter: 106.6 kg (235 lb).       Counseling  Appropriate preventive services were discussed with this patient.  Checklist reviewing preventive services available has been given to the patient.    Return in about 1 year (around 6/14/2025) for Preventative Visit .        Helio Alfredo is a 38 year old, presenting for the following:  Physical        6/14/2024     6:58 AM   Additional Questions   Roomed by Diane LAMB        Health Care Directive  Patient does not have a Health Care Directive or Living Will.    HPI    Derm- dry patch on left hand, scaly. Has been using Aquaphor ointment at night.      Lump on right side of neck, moves, no pain. First noticed 2 months ago.      Sometimes notices she gets jittery but feels better after eating, would like her A1C checked. Notices that if she forgets to eat or doesn't eat as well.Goes away after eating.  Also wants her iron checked, has had low iron in the past.   Hemoglobin A1C   Date Value Ref Range Status   06/14/2024 5.1 0.0 - 5.6 % Final     Comment:     Normal <5.7%   Prediabetes 5.7-6.4%    Diabetes 6.5% or higher     Note: Adopted from ADA consensus guidelines.   06/11/2003 5.4 4.3 - 6.0 % Final        Asthma Follow-Up    Was ACT completed today?  Yes        6/13/2024     4:39 PM   ACT Total Scores   ACT TOTAL SCORE (Goal Greater than or Equal to 20) 23   In the past 12 months, how many times did you visit the emergency room for your asthma without being admitted to the hospital? 0   In the past 12 months, how many times were you hospitalized overnight because of your asthma? 0        How many days per week do you miss taking your asthma controller " medication?  I do not have an asthma controller medication  Please describe any recent triggers for your asthma:  allergy induced  Have you had any Emergency Room Visits, Urgent Care Visits, or Hospital Admissions since your last office visit?  No      Hypertension Follow-up    Do you check your blood pressure regularly outside of the clinic? Yes   Are you following a low salt diet? No  Are your blood pressures ever more than 140 on the top number (systolic) OR more   than 90 on the bottom number (diastolic), for example 140/90? Yes      Working with therapist, doing EMDR for history of medical anxiety.         12/11/2012     8:12 AM 5/30/2014     4:27 PM 6/18/2019     4:53 PM   THELMA-7 SCORE   Total Score 1 0    Total Score   6         6/18/2019     4:53 PM   PHQ   PHQ-9 Total Score 4   Q9: Thoughts of better off dead/self-harm past 2 weeks Not at all             6/13/2024   General Health   How would you rate your overall physical health? Good   Feel stress (tense, anxious, or unable to sleep) Only a little   (!) STRESS CONCERN      6/13/2024   Nutrition   Three or more servings of calcium each day? Yes   Diet: Regular (no restrictions)   How many servings of fruit and vegetables per day? (!) 2-3   How many sweetened beverages each day? 0-1         6/13/2024   Exercise   Days per week of moderate/strenous exercise 3 days   Average minutes spent exercising at this level 20 min         6/13/2024   Social Factors   Frequency of gathering with friends or relatives Twice a week   Worry food won't last until get money to buy more No   Food not last or not have enough money for food? No   Do you have housing?  Yes   Are you worried about losing your housing? No   Lack of transportation? No   Unable to get utilities (heat,electricity)? No         6/13/2024   Dental   Dentist two times every year? Yes         6/13/2024   TB Screening   Were you born outside of the [place]? No         Today's PHQ-2 Score:       6/13/2024     4:37  PM   PHQ-2 (  [employer])   Q1: Little interest or pleasure in doing things 0   Q2: Feeling down, depressed or hopeless 0   PHQ-2 Score 0   Q1: Little interest or pleasure in doing things Not at all   Q2: Feeling down, depressed or hopeless Not at all   PHQ-2 Score 0           2024   Substance Use   Alcohol more than 3/day or more than 7/wk No   Do you use any other substances recreationally? No     Social History     Tobacco Use    Smoking status: Former     Current packs/day: 0.00     Average packs/day: 0.5 packs/day for 2.1 years (1.0 ttl pk-yrs)     Types: Cigarettes     Start date: 1999     Quit date: 2001     Years since quittin.3    Smokeless tobacco: Never   Vaping Use    Vaping status: Never Used   Substance Use Topics    Alcohol use: Not Currently     Comment: 1 glass of wine few times per month    Drug use: No        Start mammograms at age 39 in October.      Mammogram Screening - Mammogram every 1-2 years updated in Health Maintenance based on mutual decision making        2024   STI Screening   New sexual partner(s) since last STI/HIV test? (!) YES      History of abnormal Pap smear: No - age 30- 64 PAP with HPV every 5 years recommended        Latest Ref Rng & Units 2020     9:48 AM 2020     9:46 AM 3/11/2016    10:47 AM   PAP / HPV   PAP (Historical)   NIL     HPV 16 DNA NEG^Negative Negative   Negative    HPV 18 DNA NEG^Negative Negative   Negative    Other HR HPV NEG^Negative Negative   Negative          [facility] -  NIL pap and negative HPV on 23   Contraception/Family Planning   Questions about contraception or family planning No     Reviewed and updated as needed this visit by Provider     Meds                BP Readings from Last 3 Encounters:   24 (!) 128/94   24 136/88   23 132/86    Wt Readings from Last 3 Encounters:   24 106.6 kg (235 lb)   24 107.1 kg (236 lb 1.6 oz)   23 106.6 kg  (235 lb)                  Patient Active Problem List   Diagnosis    Allergic rhinitis    Encounter for pre-operative cardiovascular clearance    Varicose Veins of Legs- painful with standing    Abnormal Mammogram- 2/2006 - prob benign     Heart palpitations - since 2010 intermittent. Reports multiple work-ups with unclear cause. Followed by cardiology.    CARDIOVASCULAR SCREENING; LDL GOAL LESS THAN 160    Anxiety    Migraine with aura and without status migrainosus, not intractable - since 2003; stable.    Mild persistent asthma without complication    Bilateral ankle pain    Family history of breast cancer - paternal grandma, paternal aunt and paternal great-grandmother. Dad completed genetic screening which was neg.     Class 2 obesity due to excess calories without serious comorbidity with body mass index (BMI) of 37.0 to 37.9 in adult    Segmental dysfunction of cervical region    Segmental dysfunction of thoracic region    Segmental dysfunction of lumbar region    Segmental dysfunction of sacral region    Mechanical back pain    Stroke (H)    Vertebral artery dissection (H24)    Borderline Aortic root enlargement (H)    History of stroke    Brain aneurysm    Hypertension goal BP (blood pressure) < 140/90    Morbid obesity (H)    Patent foramen ovale    Thyroid nodule     Past Surgical History:   Procedure Laterality Date    BIOPSY  1999    2 moles removed from scalp and biopsied, non cancerous.    COLONOSCOPY N/A 12/01/2021    Procedure: Unable to complete EGD; COLONOSCOPY;  Surgeon: Gume Douglas MD;  Location:  GI    ESOPHAGOSCOPY, GASTROSCOPY, DUODENOSCOPY (EGD), COMBINED N/A 12/01/2021    Procedure: ESOPHAGOGASTRODUODENOSCOPY (EGD) ATTEMPTED BUT UNABLE TO COMPLETE, needs deeper sedation; patient too gaggy;  Surgeon: Gume Douglas MD;  Location:  GI    NO HISTORY OF SURGERY         Social History     Tobacco Use    Smoking status: Former     Current packs/day: 0.00     Average packs/day: 0.5  packs/day for 2.1 years (1.0 ttl pk-yrs)     Types: Cigarettes     Start date: 1999     Quit date: 2001     Years since quittin.3    Smokeless tobacco: Never   Substance Use Topics    Alcohol use: Not Currently     Comment: 1 glass of wine few times per month     Family History   Problem Relation Age of Onset    Lipids Father     Neurologic Disorder Father         epilepsy    Heart Disease Father 47        MI    Hypertension Father     Hyperlipidemia Father     Anxiety Disorder Father     Lipids Sister     Hyperlipidemia Sister     Breast Cancer Paternal Grandmother         3rd generation     Breast Cancer Paternal Aunt     Diabetes Mother         gestational     Asthma Mother     Diabetes Paternal Grandfather     Other Cancer Paternal Grandfather         Blood    Other Cancer Maternal Grandfather         Skin    Hyperlipidemia Paternal Half-Sister     Breast Cancer Other     Other Cancer Maternal Grandmother         Lung    Ovarian Cancer Maternal Grandmother     Lung Cancer Maternal Grandmother     Anesthesia Reaction Maternal Grandmother     Diabetes Maternal Uncle     Diabetes Maternal Uncle     Colon Cancer No family hx of          Current Outpatient Medications   Medication Sig Dispense Refill    aspirin (ASA) 81 MG EC tablet Take 1 tablet (81 mg) by mouth daily      azelastine (ASTELIN) 0.1 % nasal spray Spray 1 spray into both nostrils 2 times daily      famotidine (PEPCID) 20 MG tablet Take 20 mg by mouth daily      hydrOXYzine HCl (ATARAX) 25 MG tablet Take 1 tablet (25 mg) by mouth every 6 hours as needed for anxiety 30 tablet 1    levalbuterol (XOPENEX HFA) 45 MCG/ACT inhaler Inhale 2 puffs into the lungs every 6 hours as needed for shortness of breath or wheezing 15 g 1    loratadine (CLARITIN) 10 MG tablet Take 10 mg by mouth daily      metoprolol succinate ER (TOPROL XL) 50 MG 24 hr tablet Take 1 tablet (50 mg) by mouth daily 90 tablet 3    Multiple Vitamins-Minerals (MULTIVITAMIN GUMMIES  "WOMENS) CHEW Smarty Pants Women's Complete      PROBIOTIC PRODUCT PO Take 1 tablet by mouth daily      triamcinolone (KENALOG) 0.1 % external ointment Apply topically 2 times daily 15 g 0    VITAMIN D PO Take 2,000 Int'l Units by mouth      zinc gluconate 50 MG tablet Take 50 mg by mouth as needed         Review of Systems  Constitutional, HEENT, cardiovascular, pulmonary, gi and gu systems are negative, except as otherwise noted.     Objective    Exam  BP (!) 128/94   Pulse 84   Temp 97.8  F (36.6  C)   Resp 18   Ht 1.651 m (5' 5\")   Wt 106.6 kg (235 lb)   LMP 05/28/2024   SpO2 98%   BMI 39.11 kg/m     Estimated body mass index is 39.11 kg/m  as calculated from the following:    Height as of this encounter: 1.651 m (5' 5\").    Weight as of this encounter: 106.6 kg (235 lb).    Physical Exam    GENERAL: alert and no distress  EYES: Eyes grossly normal to inspection  HENT: ear canals and TM's normal, nose and mouth without ulcers or lesions  NECK: right supraclavicular, cervical lymphadenopathy, no asymmetry, masses, or scars  RESP: lungs clear to auscultation - no rales, rhonchi or wheezes  CV: regular rate and rhythm, normal S1 S2, no S3 or S4, no murmur, click or rub, no peripheral edema  ABDOMEN: soft, nontender, no hepatosplenomegaly, no masses and bowel sounds normal  MS: no gross musculoskeletal defects noted, no edema  SKIN: no suspicious lesions or rashes  NEURO: Normal strength and tone, mentation intact and speech normal  PSYCH: mentation appears normal, affect normal/bright        Signed Electronically by: Brittany Miles, APRN CNP    "

## 2024-06-14 NOTE — PATIENT INSTRUCTIONS
"Patient Education   Preventive Care Advice   This is general advice we often give to help people stay healthy. Your care team may have specific advice just for you. Please talk to your care team about your own preventive care needs.  Lifestyle  Exercise at least 150 minutes each week (30 minutes a day, 5 days a week).  Do muscle strengthening activities 2 days a week. These help control your weight and prevent disease.  No smoking.  Wear sunscreen to prevent skin cancer.  Have your home tested for radon every 2 to 5 years. Radon is a colorless, odorless gas that can harm your lungs. To learn more, go to www.health.[facility].[place].us and search for \"Radon in Homes.\"  Keep guns unloaded and locked up in a safe place like a safe or gun vault, or, use a gun lock and hide the keys. Always lock away bullets separately. To learn more, visit [employer].gov and search for \"safe gun storage.\"  Nutrition  Eat 5 or more servings of fruits and vegetables each day.  Try wheat bread, brown rice and whole grain pasta (instead of white bread, rice, and pasta).  Get enough calcium and vitamin D. Check the label on foods and aim for 100% of the RDA (recommended daily allowance).  Regular exams  Have a dental exam and cleaning every 6 months.  See your health care team every year to talk about:  Any changes in your health.  Any medicines your care team has prescribed.  Preventive care, family planning, and ways to prevent chronic diseases.  Shots (vaccines)   HPV shots (up to age 26), if you've never had them before.  Hepatitis B shots (up to age 59), if you've never had them before.  COVID-19 shot: Get this shot when it's due.  Flu shot: Get a flu shot every year.  Tetanus shot: Get a tetanus shot every 10 years.  Pneumococcal, hepatitis A, and RSV shots: Ask your care team if you need these based on your risk.  Shingles shot (for age 50 and up).  General health tests  Diabetes screening:  Starting at age 35, Get screened for diabetes at least " every 3 years.  If you are younger than age 35, ask your care team if you should be screened for diabetes.  Cholesterol test: At age 39, start having a cholesterol test every 5 years, or more often if advised.  Bone density scan (DEXA): At age 50, ask your care team if you should have this scan for osteoporosis (brittle bones).  Hepatitis C: Get tested at least once in your life.  Abdominal aortic aneurysm screening: Talk to your doctor about having this screening if you:  Have ever smoked; and  Are biologically male; and  Are between the ages of 65 and 75.  STIs (sexually transmitted infections)  Before age 24: Ask your care team if you should be screened for STIs.  After age 24: Get screened for STIs if you're at risk. You are at risk for STIs (including HIV) if:  You are sexually active with more than one person.  You don't use condoms every time.  You or a partner was diagnosed with a sexually transmitted infection.  If you are at risk for HIV, ask about PrEP medicine to prevent HIV.  Get tested for HIV at least once in your life, whether you are at risk for HIV or not.  Cancer screening tests  Cervical cancer screening: If you have a cervix, begin getting regular cervical cancer screening tests at age 21. Most people who have regular screenings with normal results can stop after age 65. Talk about this with your provider.  Breast cancer scan (mammogram): If you've ever had breasts, begin having regular mammograms starting at age 40. This is a scan to check for breast cancer.  Colon cancer screening: It is important to start screening for colon cancer at age 45.  Have a colonoscopy test every 10 years (or more often if you're at risk) Or, ask your provider about stool tests like a FIT test every year or Cologuard test every 3 years.  To learn more about your testing options, visit: www.[employer]/804493.pdf.  For help making a decision, visit: prieto/ff44907.  Prostate cancer screening test: If you have a  prostate and are age 55 to 69, ask your provider if you would benefit from a yearly prostate cancer screening test.  Lung cancer screening: If you are a current or former smoker age 50 to 80, ask your care team if ongoing lung cancer screenings are right for you.  For informational purposes only. Not to replace the advice of your health care provider. Copyright   2023 [facility]. All rights reserved. Clinically reviewed by the [facility] Transitions Program. [employer] 977446 - REV 04/24.  Safer Sex: Care Instructions  Overview  Safer sex is a way to reduce your risk of getting a sexually transmitted infection (STI). It can also help prevent pregnancy.  Several products can help you practice safer sex and reduce your chance of STIs. One of the best is a condom. There are internal and external condoms. You can use a special rubber sheet (dental dam) for protection during oral sex. Disposable gloves can keep your hands from touching blood, semen, or other body fluids that can carry infections.  Remember that birth control methods such as diaphragms, IUDs, foams, and birth control pills do not stop you from getting STIs.  Follow-up care is a key part of your treatment and safety. Be sure to make and go to all appointments, and call your doctor if you are having problems. It's also a good idea to know your test results and keep a list of the medicines you take.  How can you care for yourself at home?  Think about getting vaccinated to help prevent hepatitis A, hepatitis B, and human papillomavirus (HPV). They can be spread through sex.  Use a condom every time you have sex. Use an external condom, which goes on the penis. Or use an internal condom, which goes into the vagina or anus.  Make sure you use the right size external condom. A condom that's too small can break easily. A condom that's too big can slip off during sex.  Use a new condom each time you have sex. Be careful not to poke a hole in  "the condom when you open the wrapper.  Don't use an internal condom and an external condom at the same time.  Never use petroleum jelly (such as Vaseline), grease, hand lotion, baby oil, or anything with oil in it. These products can make holes in the condom.  After intercourse, hold the edge of the condom as you remove it. This will help keep semen from spilling out of the condom.  Do not have sex with anyone who has symptoms of an STI, such as sores on the genitals or mouth.  Do not drink a lot of alcohol or use drugs before sex.  Limit your sex partners. Sex with one partner who has sex only with you can reduce your risk of getting an STI.  Don't share sex toys. But if you do share them, use a condom and clean the sex toys between each use.  Talk to any partners before you have sex. Talk about what you feel comfortable with and whether you have any boundaries with sex. And find out if your partner or partners may be at risk for any STI. Keep in mind that a person may be able to spread an STI even if they do not have symptoms. You and any partners may want to get tested for STIs.  Where can you learn more?  Go to https://www.[employer].net/patiented  Enter B608 in the search box to learn more about \"Safer Sex: Care Instructions.\"  Current as of: November 27, 2023               Content Version: 14.0    8398-9610 [employer].   Care instructions adapted under license by your healthcare professional. If you have questions about a medical condition or this instruction, always ask your healthcare professional. [employer] disclaims any warranty or liability for your use of this information.         "

## 2024-06-17 NOTE — RESULT ENCOUNTER NOTE
Dear Sayda,     -HDL(good) cholesterol level is low (this can be improved by exercise).  Your LDL(bad) cholesterol and trigylceride levels are normal.  ADVISE: exercising 150 minutes of aerobic exercise per week (30 minutes 5 days per week or 50 minutes 3 days per week are options).   In 12 months, you should recheck your fasting cholesterol panel.     -Liver and gallbladder tests are normal (ALT,AST, Alk phos, bilirubin), kidney function is normal (Cr, GFR), sodium is normal, potassium is normal, calcium is normal, glucose is normal.    -TSH (thyroid stimulating hormone) level is normal which indicates normal thyroid function.    -Ferritin (iron) level is normal.    -Total iron saturation level is normal.      -Hepatitis B antibody is non-reactive and you can follow-up in the pharmacy or for a nurse only appointment to complete the Hepatitis B vaccine series.      Thank you,     Brittany Miles, APRN, CNP  [facility]    If you have further questions about the interpretation of your labs, labtestsonline.org is a good website to check out for further information.

## 2024-06-19 ENCOUNTER — HOSPITAL ENCOUNTER (OUTPATIENT)
Dept: ULTRASOUND IMAGING | Facility: CLINIC | Age: 39
Discharge: HOME | End: 2024-06-19
Attending: NURSE PRACTITIONER | Admitting: NURSE PRACTITIONER
Payer: COMMERCIAL

## 2024-06-19 DIAGNOSIS — R22.1 LUMP IN NECK: ICD-10-CM

## 2024-06-19 PROCEDURE — 76536 US EXAM OF HEAD AND NECK: CPT

## 2024-07-20 DIAGNOSIS — I10 BENIGN ESSENTIAL HYPERTENSION: ICD-10-CM

## 2024-07-22 RX ORDER — METOPROLOL SUCCINATE 50 MG/1
50 TABLET, EXTENDED RELEASE ORAL DAILY
Qty: 90 TABLET | Refills: 2 | Status: SHIPPED | OUTPATIENT
Start: 2024-07-22 | End: 2024-09-10

## 2024-07-22 RX ORDER — METOPROLOL SUCCINATE 50 MG/1
50 TABLET, EXTENDED RELEASE ORAL DAILY
Qty: 30 TABLET | OUTPATIENT
Start: 2024-07-22

## 2024-09-10 DIAGNOSIS — I10 BENIGN ESSENTIAL HYPERTENSION: ICD-10-CM

## 2024-09-10 RX ORDER — METOPROLOL SUCCINATE 50 MG/1
50 TABLET, EXTENDED RELEASE ORAL DAILY
Qty: 90 TABLET | Refills: 2 | Status: SHIPPED | OUTPATIENT
Start: 2024-09-10

## 2024-10-09 ENCOUNTER — OFFICE VISIT (OUTPATIENT)
Dept: ORTHOPEDICS | Facility: CLINIC | Age: 39
End: 2024-10-09
Payer: COMMERCIAL

## 2024-10-09 ENCOUNTER — NURSE TRIAGE (OUTPATIENT)
Dept: FAMILY MEDICINE | Facility: CLINIC | Age: 39
End: 2024-10-09
Payer: COMMERCIAL

## 2024-10-09 VITALS
DIASTOLIC BLOOD PRESSURE: 95 MMHG | WEIGHT: 235 LBS | SYSTOLIC BLOOD PRESSURE: 146 MMHG | BODY MASS INDEX: 39.15 KG/M2 | HEIGHT: 65 IN

## 2024-10-09 DIAGNOSIS — M62.838 MUSCLE SPASM: Primary | ICD-10-CM

## 2024-10-09 PROCEDURE — 99204 OFFICE O/P NEW MOD 45 MIN: CPT | Performed by: PHYSICIAN ASSISTANT

## 2024-10-09 RX ORDER — CYCLOBENZAPRINE HCL 5 MG
5 TABLET ORAL 3 TIMES DAILY PRN
Qty: 10 TABLET | Refills: 0 | Status: SHIPPED | OUTPATIENT
Start: 2024-10-09

## 2024-10-09 NOTE — LETTER
10/9/2024      Sayda Valles  [address] [facility] [facility] [address]      Dear Colleague,    Thank you for referring your patient, Sayda Valles, to the [facility] [place]. Please see a copy of my visit note below.    ASSESSMENT & PLAN  Sayda Valles is seen today for her right paraspinal shoulder region muscle spasm.  We discussed given her failure of previous symptoms, recommend a course of muscle relaxer and she has tried Flexeril in the past with good relief.  She was counseled on this medication and to avoid drinking and driving while taking this medication.  We did review other over-the-counter topical creams that she may use in addition.  Should her symptoms fail to improve or worsen would recommend following up with her pain clinic providers for consideration of trigger point injections.  She was understanding this plan and agreement.  All questions answered.    Millie Fernandes PA-C  [facility]    -----  Chief Complaint   Patient presents with     Right Shoulder - Pain       SUBJECTIVE  Sayda Valles is a/an 38 year old right-handed female who is seen as a WALK IN patient for evaluation of right shoulder pain.  Onset was 5 days ago after sleeping at an [employer]. Pain is located over the scapula that radiates into the trapezius muscle. Symptoms are worsened by laying down and sleeping.  She has tried Tylenol, ice, chiropractic care, KT taping, aspirin, and massage. Associated symptoms include   limited range of motion .    The patient is seen with their friend    Prior injury/Surgical history of affected joint: Hx of back tightness  Social History/Occupation: Works in retail     REVIEW OF SYSTEMS:  Pertinent positives/negative: As stated above in HPI    OBJECTIVE:  There were no vitals taken for this visit.   General: Alert and in no distress  Skin: no visable rashes  CV: Extremities appear well perfused   Resp: normal respiratory effort, no  conversational dyspnea   Psych: normal mood, affect  MSK: Cervical Spine    Inspection:  There is no erythema, open wounds, or drainage  There is no swelling or ecchymosis    Palpation:  Tenderness to palpation about the left trapezius and paraspinal   No tenderness to palpation about the midline spine     Range of motion:  Flexion: 30 with pain  Extension: 20 with pain  Rotation: able with pain    Strength:  Shoulder Abduction: 5/5  Biceps: 5/5  Triceps: 5/5  Wrist extension: 5/5  Wrist flexion: 5/5  Hang : 5/5    Sensation/Vascular:  Sensation is intact to light touch throughout the dermatomes  Radial pulse is palpable, hand is warm and well perfused    Shoulder ROM  Flexion: 150  Abduction: 90  External: 70  Internal: L2        RADIOLOGY:  No imaging.        Again, thank you for allowing me to participate in the care of your patient.        Sincerely,        Millie Fernandes PA-C

## 2024-10-09 NOTE — PATIENT INSTRUCTIONS
1. Muscle spasm      - Symptoms more of muscle spasm.   Recommendations  - OTC Tylenol 1000 mg and Ibuprofen 400 mg every 8 hours for pain.   -Topical creams such as Voltaren gel, Asperecreme, Biofreeze, china gel, or blue emu.   - Apply heat and ice    Flexeril three times daily as needed; avoid drinking and driving while on medication  Massage therapy as needed    -Call direct clinic number [[phone]] at any time with questions or concerns.    -Orthopedic Walk in Monday through Thursday 8 am to 6 pm, Friday 8 am to 5 pm, Saturday 8 am to 12 pm at [address] [facility] [address] [place].

## 2024-10-09 NOTE — PROGRESS NOTES
ASSESSMENT & PLAN  Sayda Valles is seen today for her right paraspinal shoulder region muscle spasm.  We discussed given her failure of previous symptoms, recommend a course of muscle relaxer and she has tried Flexeril in the past with good relief.  She was counseled on this medication and to avoid drinking and driving while taking this medication.  We did review other over-the-counter topical creams that she may use in addition.  Should her symptoms fail to improve or worsen would recommend following up with her pain clinic providers for consideration of trigger point injections.  She was understanding this plan and agreement.  All questions answered.    Millie Fernandes PA-C  [facility]    -----  Chief Complaint   Patient presents with    Right Shoulder - Pain       SUBJECTIVE  Sayda Valles is a/an 38 year old right-handed female who is seen as a WALK IN patient for evaluation of right shoulder pain.  Onset was 5 days ago after sleeping at an [employer]. Pain is located over the scapula that radiates into the trapezius muscle. Symptoms are worsened by laying down and sleeping.  She has tried Tylenol, ice, chiropractic care, KT taping, aspirin, and massage. Associated symptoms include   limited range of motion .    The patient is seen with their friend    Prior injury/Surgical history of affected joint: Hx of back tightness  Social History/Occupation: Works in retail     REVIEW OF SYSTEMS:  Pertinent positives/negative: As stated above in HPI    OBJECTIVE:  There were no vitals taken for this visit.   General: Alert and in no distress  Skin: no visable rashes  CV: Extremities appear well perfused   Resp: normal respiratory effort, no conversational dyspnea   Psych: normal mood, affect  MSK: Cervical Spine    Inspection:  There is no erythema, open wounds, or drainage  There is no swelling or ecchymosis    Palpation:  Tenderness to palpation about the left trapezius and paraspinal   No  tenderness to palpation about the midline spine     Range of motion:  Flexion: 30 with pain  Extension: 20 with pain  Rotation: able with pain    Strength:  Shoulder Abduction: 5/5  Biceps: 5/5  Triceps: 5/5  Wrist extension: 5/5  Wrist flexion: 5/5  Hang : 5/5    Sensation/Vascular:  Sensation is intact to light touch throughout the dermatomes  Radial pulse is palpable, hand is warm and well perfused    Shoulder ROM  Flexion: 150  Abduction: 90  External: 70  Internal: L2        RADIOLOGY:  No imaging.

## 2024-10-09 NOTE — TELEPHONE ENCOUNTER
"Reason for Disposition   SEVERE pain (e.g., excruciating, unable to do any normal activities)    Additional Information   Negative: Shock suspected (e.g., cold/pale/clammy skin, too weak to stand, low BP, rapid pulse)   Negative: Similar pain previously and it was from 'heart attack'   Negative: Similar pain previously and it was from 'angina' and not relieved by nitroglycerin   Negative: Sounds like a life-threatening emergency to the triager   Negative: Chest pain   Negative: Followed an injury to shoulder   Negative: Difficulty breathing or unusual sweating (e.g., sweating without exertion)   Negative: Pain lasting > 5 minutes and pain also present in chest  (Exception: Pain is clearly made worse by movement.)   Negative: Age > 40 and no obvious cause and pain even when not moving the arm  (Exception: Pain is clearly made worse by moving arm or bending neck.)   Negative: Red area or streak and fever   Negative: Swollen joint and fever   Negative: Entire arm is swollen   Negative: Patient sounds very sick or weak to the triager    Answer Assessment - Initial Assessment Questions  1. ONSET: \"When did the pain start?\"      Saturday morning - 10/4/2024    2. LOCATION: \"Where is the pain located?\"      Right shoulder blade     3. PAIN: \"How bad is the pain?\" (Scale 1-10; or mild, moderate, severe)    - MILD (1-3): doesn't interfere with normal activities    - MODERATE (4-7): interferes with normal activities (e.g., work or school) or awakens from sleep    - SEVERE (8-10): excruciating pain, unable to do any normal activities, unable to move arm at all due to pain      Severe 10     4. WORK OR EXERCISE: \"Has there been any recent work or exercise that involved this part of the body?\"      As she moves throughout the day it does get better     5. CAUSE: \"What do you think is causing the shoulder pain?\"      She slept in a different bed over the weekend at an [employer]     6. OTHER SYMPTOMS: \"Do you have any other " "symptoms?\" (e.g., neck pain, swelling, rash, fever, numbness, weakness)      Denies: neck pain, swelling, rash, fever, numbness, weakness)    7. PREGNANCY: \"Is there any chance you are pregnant?\" \"When was your last menstrual period?\"      NA    Protocols used: Shoulder Pain-A-[place]    "

## 2024-10-10 ENCOUNTER — TELEPHONE (OUTPATIENT)
Dept: ORTHOPEDICS | Facility: CLINIC | Age: 39
End: 2024-10-10
Payer: COMMERCIAL

## 2024-10-10 DIAGNOSIS — M62.838 MUSCLE SPASM: Primary | ICD-10-CM

## 2024-10-10 DIAGNOSIS — M25.511 CHRONIC RIGHT SHOULDER PAIN: ICD-10-CM

## 2024-10-10 DIAGNOSIS — M25.511 ACUTE PAIN OF RIGHT SHOULDER: ICD-10-CM

## 2024-10-10 DIAGNOSIS — G89.29 CHRONIC RIGHT SHOULDER PAIN: ICD-10-CM

## 2024-10-10 RX ORDER — METHYLPREDNISOLONE 4 MG/1
TABLET ORAL
Qty: 21 TABLET | Refills: 0 | Status: SHIPPED | OUTPATIENT
Start: 2024-10-10

## 2024-10-10 NOTE — TELEPHONE ENCOUNTER
Call placed to patient to verify preferred pharmacy. Patient inquired about the dosage, and I informed her that I am not aware of the dosage, but it will be explained to her at her pharmacy.    JORDAN Rogers, ATC, LAT

## 2024-10-10 NOTE — TELEPHONE ENCOUNTER
Other: Pt was seen as a walk in, she tried the cyclobenzaprine and said it didn't do anything, she's going to try 10mg tonight. She is wondering if she can try an oral steroid to try and knock some of this out?  Please contact her to discuss this request and any other options.        Could we send this information to you in [email or web address] or would you prefer to receive a phone call?:   Patient would prefer a phone call   Okay to leave a detailed message?: Yes at Cell number on file:    Telephone Information:   Mobile [phone]

## 2024-11-03 ENCOUNTER — HEALTH MAINTENANCE LETTER (OUTPATIENT)
Age: 39
End: 2024-11-03

## 2024-12-19 NOTE — LETTER
November 11, 2021      Sayda Valles  Maxwell VANG   [facility] [id number]         Thank you for choosing [facility]. You are scheduled for the following service(s).   Please be aware that coverage of these services is subject to the terms and limitations of your health insurance plan.  Call member services at your health plan with any benefit or coverage questions.    Date:       Wednesday December 1, 2021       Procedure: UPPER ENDOSCOPY & COLONOSCOPY  Doctor:  Dr Douglas          Arrival Time:   1115  *check in at Main Door*  Procedure Time:   12noon  Location:   [facility] [place] [facility]        Endoscopy Department, First Floor (Enter throug the Main Doors) *         [address] [place], [place] [address]      [phone] or [phone] () to reschedule       Upper Endoscopy or Esophagogastroduodenoscopy (EGD) is a test performed to evaluate symptoms of persistent abdominal pain, nausea, vomiting or difficulty swallowing. It may also be used to treat various conditions of the upper gastrointestinal (GI) tract, such as bleeding, narrowing or abnormal growths. During the procedure, a doctor examines the lining of your esophagus, stomach and the first part of your small intestine through a thin, flexible tube called an endoscope. If growths or other abnormalities are found during the procedure, the doctor may remove the abnormal tissue (biopsy) for further examination.     Colonoscopy is the most accurate test to detect colon polyps and colon cancer; and the only test where polyps can be removed. During this procedure, a doctor examines the lining of your large intestine and rectum through a flexible tube.     Transportation  You must arrange for a ride for the day of your procedure with a responsible adult. A taxi , Uber, etc, is not an option unless you are accompanied by a responsible adult. If you fail to arrange transportation with a responsible adult, your  procedure will be cancelled and rescheduled.    What happens during an upper endoscopy?  On the day of your procedure, plan to spend up to one and a half hours after your arrival at the endoscopy center. The exam itself takes about 5 to 10 minutes.  Before the exam:  - You will change into a gown.   - Your medical history and medication list will be reviewed with you, unless it has already been done over the phone.   - A nurse will insert an intravenous (IV) line into your hand or arm.  - The doctor will talk to you and give you a consent form to sign.    During the exam:  - Medicine will be given through the IV line to help you relax and feel comfortable.   - Your heart rate and oxygen levels will be monitored. If your blood pressure is low, you may be given fluids through the IV line.   - The doctor will insert a flexible, hollow tube, called an endoscope, into your mouth and will advance it slowly through the esophagus, stomach and duodenum (the first part of your small intestine).   - You may have a feeling of pressure or fullness.   - If you have difficulty swallowing, and the doctor finds a narrowing in your esophagus, it may be possible for the area to be expanded-dilated during the exam.   - If abnormal tissue is found, the doctor may remove it through the endoscope (biopsy it) for closer examination. The tissue removal is painless.    After the exam:  - Any tissue samples removed during the exam will be sent to a lab for evaluation. It may take 5 to 7 working days for you to be notified of the results  - The doctor will prepare a full report for the physician who referred you for the upper endoscopy.   - The doctor will talk with you about the initial results of your exam.   - You may feel bloated after the procedure. That is normal and should not last long.   - Your throat may feel sore for a short time.   - Following the exam, you may resume your normal diet. Avoid alcohol until the next day.   - You may  resume your regular activities the day after the procedure.   - Medication given during the exam will prohibit you from driving for the rest of the day.  - A nurse will provide you with complete discharge instructions before you leave the endoscopy center. Be sure to ask the nurse for specific instructions if you take blood thinners such as Aspirin , Coumadin , Lovenox , Plavix , etc.       PREPARATION FOR THE UPPER ENDOSCOPY  To ensure a successful exam, please follow all instructions carefully.      The night before your exam:    STOP eating solid foods at 11:45 pm.     Clear liquids are okay to drink (examples: Gatorade , apple juice, clear broth,coffee or tea without milk or cream, etc.).     DO NOT drink red liquids or alcoholic beverages.    The day of your exam:    STOP drinking clear liquids 4 hours before your exam.     You may take your usual medications with 4 oz. of water, but it needs to be at least 4 hours prior to your procedure.    When you leave for the procedure:    Bring a list of all of your current medications, including any allergy or over-the-counter medications, unless you have already reviewed that with an Endoscopy RN over the phone.     Bring a photo ID as well as up-to-date insurance information, such as your insurance card and any referral forms that might be required by your payer.     COLONOSCOPY:  MIRALAX -GATORADE  PREP  Purchase the  following supplies at your local pharmacy:  - 2 (two) bisacodyl tablets: each tablet contains 5 mg.  (Dulcolax  laxative NOT Dulcolax  stool softener)   - 1 (one) 8.3 oz bottle of Polyethylene Glycol (PEG) 3350 Powder   (MiraLAX , Smooth LAX , ClearLAX  or equivalent)  - 64 oz Gatorade    Regular Gatorade, Gatorade G2 , Powerade , Powerade Zero  or Pedialyte  is acceptable. Red colored flavors are not allowed; all other colors (yellow, green, orange, purple and blue) are okay. It is also okay to buy two 2.12 oz packets of powdered Gatorade that can be  mixed with water to a total volume of 64 oz of liquid.  - 1 (one) 10 oz bottle of Magnesium Citrate (Red colored flavors are not allowed)  It is also okay for you to use a 0.5 oz package of powdered magnesium citrate (17 g) mixed with 10 oz of water.      PREPARATION FOR COLONOSCOPY  7 days before:    Discontinue fiber supplements and medications containing iron. This includes Metamucil  and Fibercon ; and multivitamins with iron.    3 days before:    Begin a low-fiber diet. A low-fiber diet helps making the cleanout more effective.     Examples of a low-fiber diet include (but are not limited to): white bread, white rice, pasta, crackers, fish, chicken, eggs, ground beef, creamy peanut butter, cooked/steamed/boiled vegetables, canned fruit, bananas, melons, milk, plain yogurt cheese, salad dressing and other condiments.     The following are not allowed on a low-fiber diet: seeds, nuts, popcorn, bran, whole wheat, corn, quinoa, raw fruits and vegetables, berries and dried fruit, beans and lentils.    For additional details on low-fiber diet, please refer to the table on the last page.    2 days before:    Continue the low-fiber diet.     Drink at least 8 glasses of water throughout the day.     Stop eating solid foods at 11:45 pm.    1 day before:    In the morning: begin a clear liquid diet (liquids you can see through).     Examples of a clear liquid diet include: water, clear broth or bouillon, Gatorade, Pedialyte or Powerade, carbonated and non-carbonated soft drinks (Sprite , 7-Up , ginger ale), strained fruit juices without pulp (apple, white grape, white cranberry), Jell-O  and popsicles.     The following are not allowed on a clear liquid diet: red liquids, alcoholic beverages, dairy products (milk, creamer, and yogurt), protein shakes, creamy broths, juice with pulp and chewing tobacco.    At noon: take 2 (two) bisacodyl tablets     At 4 (and no later than 6pm): start drinking the Miralax-Gatorade  preparation (8.3 oz of Miralax mixed with 64 oz of Gatorade in a large pitcher). Drink 1(one) 8 oz glass every 15 minutes thereafter, until the mixture is gone.    COLON CLEANSING TIPS: drink adequate amounts of fluids before and after your colon cleansing to prevent dehydration. Stay near a toilet because you will have diarrhea. Even if you are sitting on the toilet, continue to drink the cleansing solution every 15 minutes. If you feel nauseous or vomit, rinse your mouth with water, take a 15 to 30-minute-break and then continue drinking the solution. You will be uncomfortable until the stool has flushed from your colon (in about 2 to 4 hours). You may feel chilled.    Day of your procedure  You may take all of your morning medications including blood pressure medications, blood thinners (if you have not been instructed to stop these by our office), methadone, anti-seizure medications with sips of water 3 hours prior to your procedure or earlier. Do not take insulin or vitamins prior to your procedure. Continue the clear liquid diet.   4 hours prior: drink 10 oz of magnesium citrate. It may be easier to drink it with a straw.    STOP consuming all liquids after that.     Do not take anything by mouth during this time.     Allow extra time to travel to your procedure as you may need to stop and use a restroom along the way.  You are ready for the procedure, if you followed all instructions and your stool is no longer formed, but clear or yellow liquid. If you are unsure whether your colon is clean, please call our office at [phone] before you leave for your appointment.  Bring the following to your procedure:  - Insurance Card/Photo ID.   - List of current medications including over-the-counter medications and supplements.   - Your rescue inhaler if you currently use one to control asthma.    Canceling or rescheduling your appointment:   If you must cancel or reschedule your appointment, please call  [phone] as soon as possible.    COLONOSCOPY PRE-PROCEDURE CHECKLIST  If you have diabetes, ask your regular doctor for diet and medication restrictions.  If you take an anticoagulant or anti-platelet medication (such as Coumadin , Lovenox , Pradaxa , Xarelto , Eliquis , etc.), please call your primary doctor for advice on holding this medication.  If you take aspirin you may continue to do so.  If you are or may be pregnant, please discuss the risks and benefits of this procedure with your doctor.    What happens during a colonoscopy?    Plan to spend up to two hours, starting at registration time, at the endoscopy center the day of your procedure. The colonoscopy takes an average of 15 to 30 minutes. Recovery time is about 30 minutes.      Before the exam:    You will change into a gown.    Your medical history and medication list will be reviewed with you, unless that has been done over the phone prior to the procedure.     A nurse will insert an intravenous (IV) line into your hand or arm.    The doctor will meet with you and will give you a consent form to sign.  During the exam:     Medicine will be given through the IV line to help you relax.     Your heart rate and oxygen levels will be monitored. If your blood pressure is low, you may be given fluids through the IV line.     The doctor will insert a flexible hollow tube, called a colonoscope, into your rectum. The scope will be advanced slowly through the large intestine (colon).    You may have a feeling of fullness or pressure.     If an abnormal tissue or a polyp is found, the doctor may remove it through the endoscope for closer examination, or biopsy. Tissue removal is painless    After the exam:           Any tissue samples removed during the exam will be sent to a lab for evaluation. It may take 5-7 working days for you to be notified of the results.     A nurse will provide you with complete discharge instructions before you leave the endoscopy  center. Be sure to ask the nurse for specific instructions if you take blood thinners such as Aspirin, Coumadin or Plavix.     The doctor will prepare a full report for you and for the physician who referred you for the procedure.     Your doctor will talk with you about the initial results of your exam.      Medication given during the exam will prohibit you from driving for the rest of the day.     Following the exam, you may resume your normal diet. Your first meal should be light, no greasy foods. Avoid alcohol until the next day.     You may resume your regular activities the day after the procedure.     LOW-FIBER DIET    Foods RECOMMENDED Foods to AVOID   Breads, Cereal, Rice and Pasta:   White bread, rolls, biscuits, croissant and gage toast.   Waffles, [place] toast and pancakes.   White rice, noodles, pasta, macaroni and peeled cooked potatoes.   Plain crackers and saltines.   Cooked cereals: farina, cream of rice.   Cold cereals: Puffed Rice , Rice Krispies , Corn Flakes  and Special K    Breads, Cereal, Rice and Pasta:   Breads or rolls with nuts, seeds or fruit.   Whole wheat, pumpernickel, rye breads and cornbread.   Potatoes with skin, brown or wild rice, and kasha (buckwheat).     Vegetables:   Tender cooked and canned vegetables without seeds: carrots, asparagus tips, green or wax beans, pumpkin, spinach, lima beans. Vegetables:   Raw or steamed vegetables (w/ or without seeds)   Sauerkraut.   Winter squash, peas, broccoli, Brussel sprouts, cabbage, onions, cauliflower, baked beans, peas and corn.   Fruits:   Strained fruit juice.   Canned fruit, except pineapple.   Ripe bananas and melon. Fruits:   Prunes and prune juice.   Raw fruits.   Dried fruits: figs, dates and raisins.   Milk/Dairy:   Milk: plain or flavored; yogurt; ice cream.   Custard; cheese and cottage cheese Milk/Dairy:     Meat and other proteins:   Ground, well-cooked tender beef, lamb, ham, veal, pork, fish, poultry, organ meats and  eggs.   Peanut butter without nuts. Meat and other proteins:   Tough, fibrous meats with gristle.   Dry beans and peas; lentils and Tofu   Peanut butter with nuts.   Fats, Snack, Sweets, Condiments and Beverages:   Margarine, butter, oils, mayonnaise, sour cream and salad dressing, plain gravy.   Sugar, hard candy, clear jelly, honey and syrup.   Spices, cooked herbs, bouillon, broth and soups made with allowed vegetable, ketchup and mustard.   Coffee, tea and carbonated drinks.   Plain cakes, cookies and pretzels.   Gelatin, plain puddings, custard, ice cream, sherbet and popsicles. Fats, Snack, Sweets, Condiments and Beverages:   Nuts, seeds and coconut.   Jam, marmalade and preserves.   Pickles, olives, relish and horseradish.   All desserts containing nuts, seeds, dried fruit and coconut; or made from whole grains or bran.   Candy made with nuts or seeds.   Popcorn.         DIRECTIONS TO THE ENDOSCOPY DEPARTMENT    From the north ([facility])  Take [address] South, exit on [address] [address]. Get into the left hand nakia, turn left (east), go one-half mile to [address] and turn left. Go north to the second stoplight, take a right on [address] and follow it to the Main Hospital entrance.    From the south ([facility])  Take [address]N to the [address] split and exit on [address] [address]. On [address] [address], turn left (west) to [address]. Turn right (north) on [address]. Go north to the second stoplight, take a right on [address] and follow it to the Main Hospital entrance.    From the east via 35E ([facility])  Take [address] south to [address] [address] exit. Turn right on [address] [address]. Go west to [address]. Turn right (north) on [address]. Go to the second stoplight, take a right on [address] to the Main Hospital entrance.    From the east via [address] ([facility])  [address] to [address]. Turn left (south) on [address]  to [address], turn left (east) on [address] and follow it to the Main Hospital entrance.    From the west via [address] ([place], [place])  [address] to [address]. Turn right (south) on [address] to [address], turn left (east) on [address] and follow it to the Main [facility] entrance.                               No

## 2024-12-26 ENCOUNTER — PATIENT OUTREACH (OUTPATIENT)
Dept: CARE COORDINATION | Facility: CLINIC | Age: 39
End: 2024-12-26
Payer: COMMERCIAL

## 2025-01-10 ENCOUNTER — ORDERS ONLY (AUTO-RELEASED) (OUTPATIENT)
Dept: CARDIOLOGY | Facility: CLINIC | Age: 40
End: 2025-01-10

## 2025-01-10 DIAGNOSIS — R00.2 PALPITATIONS: ICD-10-CM

## 2025-01-28 LAB — CV ZIO PRELIM RESULTS: NORMAL

## 2025-02-05 ENCOUNTER — HOSPITAL ENCOUNTER (OUTPATIENT)
Dept: CARDIOLOGY | Facility: CLINIC | Age: 40
Discharge: HOME | End: 2025-02-05
Attending: PHYSICIAN ASSISTANT
Payer: COMMERCIAL

## 2025-02-05 ENCOUNTER — HOSPITAL ENCOUNTER (OUTPATIENT)
Dept: ULTRASOUND IMAGING | Facility: CLINIC | Age: 40
Discharge: HOME | End: 2025-02-05
Attending: PHYSICIAN ASSISTANT
Payer: COMMERCIAL

## 2025-02-05 ENCOUNTER — HOSPITAL ENCOUNTER (OUTPATIENT)
Dept: CT IMAGING | Facility: CLINIC | Age: 40
Discharge: HOME | End: 2025-02-05
Payer: COMMERCIAL

## 2025-02-05 DIAGNOSIS — I67.1 CEREBRAL ANEURYSM: ICD-10-CM

## 2025-02-05 DIAGNOSIS — I10 BENIGN ESSENTIAL HYPERTENSION: ICD-10-CM

## 2025-02-05 DIAGNOSIS — R00.2 PALPITATIONS: ICD-10-CM

## 2025-02-05 LAB — LVEF ECHO: NORMAL

## 2025-02-05 PROCEDURE — 93975 VASCULAR STUDY: CPT

## 2025-02-05 PROCEDURE — 250N000009 HC RX 250

## 2025-02-05 PROCEDURE — 76770 US EXAM ABDO BACK WALL COMP: CPT

## 2025-02-05 PROCEDURE — 93880 EXTRACRANIAL BILAT STUDY: CPT

## 2025-02-05 PROCEDURE — C8929 TTE W OR WO FOL WCON,DOPPLER: HCPCS

## 2025-02-05 PROCEDURE — 999N000208 ECHOCARDIOGRAM COMPLETE

## 2025-02-05 PROCEDURE — 93880 EXTRACRANIAL BILAT STUDY: CPT | Mod: 26 | Performed by: INTERNAL MEDICINE

## 2025-02-05 PROCEDURE — 70496 CT ANGIOGRAPHY HEAD: CPT

## 2025-02-05 PROCEDURE — 255N000002 HC RX 255 OP 636: Performed by: PHYSICIAN ASSISTANT

## 2025-02-05 PROCEDURE — 250N000011 HC RX IP 250 OP 636

## 2025-02-05 PROCEDURE — 93306 TTE W/DOPPLER COMPLETE: CPT | Mod: 26 | Performed by: INTERNAL MEDICINE

## 2025-02-05 RX ORDER — IOPAMIDOL 755 MG/ML
67 INJECTION, SOLUTION INTRAVASCULAR ONCE
Status: COMPLETED | OUTPATIENT
Start: 2025-02-05 | End: 2025-02-05

## 2025-02-05 RX ADMIN — HUMAN ALBUMIN MICROSPHERES AND PERFLUTREN 9 ML: 10; .22 INJECTION, SOLUTION INTRAVENOUS at 08:18

## 2025-02-05 RX ADMIN — SODIUM CHLORIDE 100 ML: 9 INJECTION, SOLUTION INTRAVENOUS at 09:54

## 2025-02-05 RX ADMIN — IOPAMIDOL 67 ML: 755 INJECTION, SOLUTION INTRAVENOUS at 09:54

## 2025-05-14 ENCOUNTER — OFFICE VISIT (OUTPATIENT)
Dept: CARDIOLOGY | Facility: CLINIC | Age: 40
End: 2025-05-14
Attending: PHYSICIAN ASSISTANT
Payer: COMMERCIAL

## 2025-05-14 VITALS
OXYGEN SATURATION: 97 % | BODY MASS INDEX: 38.64 KG/M2 | SYSTOLIC BLOOD PRESSURE: 124 MMHG | WEIGHT: 231.9 LBS | HEART RATE: 78 BPM | DIASTOLIC BLOOD PRESSURE: 87 MMHG | HEIGHT: 65 IN

## 2025-05-14 DIAGNOSIS — R00.2 PALPITATIONS: ICD-10-CM

## 2025-05-14 DIAGNOSIS — I77.9 CAROTID ARTERY DISEASE, UNSPECIFIED LATERALITY, UNSPECIFIED TYPE: ICD-10-CM

## 2025-05-14 DIAGNOSIS — I10 BENIGN ESSENTIAL HYPERTENSION: ICD-10-CM

## 2025-05-14 RX ORDER — CETIRIZINE HYDROCHLORIDE 10 MG/1
10 TABLET ORAL DAILY
COMMUNITY

## 2025-05-14 NOTE — LETTER
5/14/2025    Brittany Miles, APRN CNP  [address] [facility] [address]    RE: Sayda Valles       Dear Colleague,     I had the pleasure of seeing Sayda Valles in the [facility].  Primary Cardiologist: Dr. 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 Imaging at that time also showed possible healed left internal carotid artery dissection. She also has left paraclinoid internal carotid aneurysm for which she was referred to interventional neurology but possibility of procedure was delayed due to the pandemic. She was screened for FMD with Chest/Abd/Pel CTA and it was unremarkable. She was also noted to have borderline aortic root dilatation. Genetic testing was deferred per Dr. Roman. She has hypertension, symptomatic PVC's (low burden on prior monitors), and small PFO (neuro felt this is less likely the cause of her stroke). She has family history of premature CAD and stroke. Echocardiogram images have shown preserved LVEF with no valve disease with most recent study being in 2/2025. Recent carotid US showed less than 50% on both sides. ZIOpatch monitor in 1/2025 showed NSR with occasional premature beats.     She returns to clinic today feeling well. She has no symptoms of SOB or CP or palpitations. She is working on weight loss.     Assessment and Plan:  Sayda Valles is a very pleasant 39-year-old female with history of CVA secondary to vertebral artery dissection.  She was also found to have carotid artery dissection as well as small saccular distal carotid aneurysm.  She has PVC history, hypertension, and small PFO.     She is doing well from a cardiovascular standpoint. We will see her back in 1 year.     43 minutes spent on the date of the encounter with chart review, patient visit, care coordination, and  documentation.    The longitudinal plan of care for the diagnose(s)/condition(s) as documented were addressed during this visit. Due to the added complexity in care, I will continue to support Sayda Valles  in the subsequent management and with ongoing continuity of care.     This note was completed in part using Dragon voice recognition software. Although reviewed after completion, some word and grammatical errors may occur.    Orders this Visit:  No orders of the defined types were placed in this encounter.    Orders Placed This Encounter   Medications     cetirizine (ZYRTEC ALLERGY) 10 MG tablet     Sig: Take 10 mg by mouth daily.     Medications Discontinued During This Encounter   Medication Reason     famotidine (PEPCID) 20 MG tablet Stopped by Patient (No AVS)     loratadine (CLARITIN) 10 MG tablet Stopped by Patient (No AVS)         Encounter Diagnoses   Name Primary?     Palpitations      Benign essential hypertension      Carotid artery disease, unspecified laterality, unspecified type        CURRENT MEDICATIONS:  Current Outpatient Medications   Medication Sig Dispense Refill     aspirin (ASA) 81 MG EC tablet Take 1 tablet (81 mg) by mouth daily       cetirizine (ZYRTEC ALLERGY) 10 MG tablet Take 10 mg by mouth daily.       cyclobenzaprine (FLEXERIL) 5 MG tablet Take 1 tablet (5 mg) by mouth 3 times daily as needed for muscle spasms. 10 tablet 0     hydrOXYzine HCl (ATARAX) 25 MG tablet Take 1 tablet (25 mg) by mouth every 6 hours as needed for anxiety 30 tablet 1     metoprolol succinate ER (TOPROL XL) 50 MG 24 hr tablet TAKE 1 TABLET (50 MG) BY MOUTH DAILY 90 tablet 2     Multiple Vitamins-Minerals (MULTIVITAMIN GUMMIES WOMENS) CHEW Smarty Pants Women's Complete       PROBIOTIC PRODUCT PO Take 1 tablet by mouth daily       VITAMIN D PO Take 2,000 Int'l Units by mouth       zinc gluconate 50 MG tablet Take 50 mg by mouth as needed         ALLERGIES     Allergies   Allergen Reactions     Zithromax  [Azithromycin Dihydrate] GI Disturbance     Azithromycin Nausea     Seasonal Allergies Other (See Comments)     seasonal       PAST MEDICAL HISTORY:  Past Medical History:   Diagnosis Date     Allergic rhinitis, cause unspecified     Allergic rhinitis     Aneurysm     3.6 mm aneurysm off the ophthalmic L IC art  Seen CT, MRI     Cervicalgia 03/17/2011     Encounter for other general counseling or advice on contraception 09/12/2007     Diagnosis updated by automated process. Provider to review and confirm.     Family history of malignant neoplasm of breast     4 generations on her father's side     FAMILY HX BREAST MALIG      Heart palpitations 02/2010    PVC's - dr sandhu     Hypertension goal BP (blood pressure) < 140/90 09/15/2020     Migraine without aura, with intractable migraine, so stated, without mention of status migrainosus     sees neurologist     Mild persistent asthma      Non morbid obesity due to excess calories 05/09/2016     Simple goiter 11/25/2008    pt has no enlargement and had normal blood work-up at that time     Stroke (H) 06/15/2019    due to L vert artery dissection after chiropractor manipulation       PAST SURGICAL HISTORY:  Past Surgical History:   Procedure Laterality Date     BIOPSY  1999    2 moles removed from scalp and biopsied, non cancerous.     COLONOSCOPY N/A 12/01/2021    Procedure: Unable to complete EGD; COLONOSCOPY;  Surgeon: Gume Douglas MD;  Location:  GI     ESOPHAGOSCOPY, GASTROSCOPY, DUODENOSCOPY (EGD), COMBINED N/A 12/01/2021    Procedure: ESOPHAGOGASTRODUODENOSCOPY (EGD) ATTEMPTED BUT UNABLE TO COMPLETE, needs deeper sedation; patient too gaggy;  Surgeon: Gume Douglas MD;  Location:  GI     NO HISTORY OF SURGERY         FAMILY HISTORY:  Family History   Problem Relation Age of Onset     Lipids Father      Neurologic Disorder Father         epilepsy     Heart Disease Father 47        MI     Hypertension Father      Hyperlipidemia Father      Anxiety  Disorder Father      Lipids Sister      Hyperlipidemia Sister      Breast Cancer Paternal Grandmother         3rd generation      Breast Cancer Paternal Aunt      Diabetes Mother         gestational      Asthma Mother      Diabetes Paternal Grandfather      Other Cancer Paternal Grandfather         Blood     Other Cancer Maternal Grandfather         Skin     Hyperlipidemia Paternal Half-Sister      Breast Cancer Other      Other Cancer Maternal Grandmother         Lung     Ovarian Cancer Maternal Grandmother      Lung Cancer Maternal Grandmother      Anesthesia Reaction Maternal Grandmother      Diabetes Maternal Uncle      Diabetes Maternal Uncle      Colon Cancer No family hx of        SOCIAL HISTORY:  Social History     Socioeconomic History     Marital status: Single     Spouse name: none     Number of children: None     Years of education: None     Highest education level: None   Occupational History     Occupation:  at [employer]      Employer: OTHER   Tobacco Use     Smoking status: Former     Current packs/day: 0.00     Average packs/day: 0.5 packs/day for 2.1 years (1.0 ttl pk-yrs)     Types: Cigarettes     Start date: 1999     Quit date: 2001     Years since quittin.2     Smokeless tobacco: Never   Vaping Use     Vaping status: Never Used   Substance and Sexual Activity     Alcohol use: Not Currently     Comment: 1 glass of wine few times per month     Drug use: No     Sexual activity: Not Currently     Partners: Male     Birth control/protection: Condom   Other Topics Concern      Service No     Blood Transfusions No     Caffeine Concern No     Occupational Exposure No     Hobby Hazards No     Sleep Concern No     Stress Concern No     Weight Concern Yes     Comment: Would like to lose more weight     Special Diet No     Back Care No     Exercise Yes     Comment: Moderate     Bike Helmet No     Seat Belt Yes     Comment: always      Self-Exams Yes     Comment:  SBE encouraged monthly      Parent/sibling w/ CABG, MI or angioplasty before 65F 55M? Yes   Social History Narrative    Calcium - 2-3 dairy servings/ day     Menarche: at age 12    Menses: regular      Social Drivers of Health     Financial Resource Strain: Low Risk  (6/13/2024)    Financial Resource Strain      Within the past 12 months, have you or your family members you live with been unable to get utilities (heat, electricity) when it was really needed?: No   Food Insecurity: Low Risk  (6/13/2024)    Food Insecurity      Within the past 12 months, did you worry that your food would run out before you got money to buy more?: No      Within the past 12 months, did the food you bought just not last and you didn t have money to get more?: No   Transportation Needs: Low Risk  (6/13/2024)    Transportation Needs      Within the past 12 months, has lack of transportation kept you from medical appointments, getting your medicines, non-medical meetings or appointments, work, or from getting things that you need?: No   Physical Activity: Insufficiently Active (6/13/2024)    Exercise Vital Sign      Days of Exercise per Week: 3 days      Minutes of Exercise per Session: 20 min   Stress: No Stress Concern Present (6/13/2024)    [place] [employer] - Occupational Stress Questionnaire      Feeling of Stress : Only a little   Social Connections: Unknown (6/13/2024)    Social Connection and Isolation Panel [NHANES]      Frequency of Social Gatherings with Friends and Family: Twice a week   Interpersonal Safety: Not At Risk (1/10/2022)    Received from [facility]    Humiliation, Afraid, Rape, and Kick questionnaire      Fear of Current or Ex-Partner: No      Emotionally Abused: No      Physically Abused: No      Sexually Abused: No   Housing Stability: Low Risk  (6/13/2024)    Housing Stability      Do you have housing? : Yes      Are you worried about losing your  "housing?: No       Review of Systems:  Skin:        Eyes:  Positive for    ENT:  Positive for    Respiratory:  Negative    Cardiovascular:    palpitations, Positive for, edema  Gastroenterology:      Genitourinary:       Musculoskeletal:       Neurologic:       Psychiatric:       Heme/Lymph/Imm:       Endocrine:         Physical Exam:  Vitals: Ht 1.651 m (5' 5\")   BMI 38.77 kg/m       GEN:  NAD  NECK: No JVD  C/V:  Regular rate and rhythm, no murmur, rub or gallop.  RESP: Clear to auscultation bilaterally without wheezing, rales, or rhonchi.  GI: Abdomen soft, nontender, nondistended. No HSM appreciated.   EXTREM: No pitting LE edema.   NEURO: Alert and oriented, cooperative. No obvious focal deficits.   PSYCH: Normal affect.  SKIN: Warm and dry.       Recent Lab Results:  LIPID RESULTS:  Lab Results   Component Value Date    CHOL 161 06/14/2024    CHOL 167 09/22/2020    HDL 40 (L) 06/14/2024    HDL 42 (L) 09/22/2020    LDL 96 06/14/2024     (H) 09/22/2020    TRIG 125 06/14/2024    TRIG 96 09/22/2020    CHOLHDLRATIO 3.8 07/21/2014       LIVER ENZYME RESULTS:  Lab Results   Component Value Date    AST 19 06/14/2024    AST 13 09/22/2020    ALT 19 06/14/2024    ALT 23 09/22/2020       CBC RESULTS:  Lab Results   Component Value Date    WBC 5.7 06/14/2024    WBC 7.7 06/16/2019    RBC 4.91 06/14/2024    RBC 4.82 06/16/2019    HGB 13.3 06/14/2024    HGB 12.0 06/16/2019    HCT 39.7 06/14/2024    HCT 38.8 06/16/2019    MCV 81 06/14/2024    MCV 81 06/16/2019    MCH 27.1 06/14/2024    MCH 24.9 (L) 06/16/2019    MCHC 33.5 06/14/2024    MCHC 30.9 (L) 06/16/2019    RDW 12.8 06/14/2024    RDW 13.7 06/16/2019     06/14/2024     06/16/2019       BMP RESULTS:  Lab Results   Component Value Date     06/14/2024     09/22/2020    POTASSIUM 4.2 06/14/2024    POTASSIUM 4.4 09/20/2021    POTASSIUM 4.1 09/22/2020    CHLORIDE 105 06/14/2024    CHLORIDE 109 09/20/2021    CHLORIDE 109 09/22/2020    CO2 23 " "06/14/2024    CO2 27 09/20/2021    CO2 21 09/22/2020    ANIONGAP 10 06/14/2024    ANIONGAP 2 (L) 09/20/2021    ANIONGAP 6 09/22/2020    GLC 93 06/14/2024    GLC 77 09/20/2021    GLC 87 09/22/2020    BUN 10.3 06/14/2024    BUN 10 09/20/2021    BUN 9 09/22/2020    CR 0.70 06/14/2024    CR 0.70 09/22/2020    GFRESTIMATED >90 06/14/2024    GFRESTIMATED >90 09/22/2020    GFRESTBLACK >90 09/22/2020    PRANAV 8.9 06/14/2024    PRANAV 8.3 (L) 09/22/2020        A1C RESULTS:  Lab Results   Component Value Date    A1C 5.1 06/14/2024    A1C 5.4 06/11/2003       INR RESULTS:  No results found for: \"INR\"          Dulce Andrew PA-C  May 14, 2025       Thank you for allowing me to participate in the care of your patient.      Sincerely,     Dulce Andrew PA-C     [facility]  cc:   Dulce Andrew PA-C  [address] [address]      "

## 2025-05-14 NOTE — PROGRESS NOTES
Primary Cardiologist: Dr. 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 Imaging at that time also showed possible healed left internal carotid artery dissection. She also has left paraclinoid internal carotid aneurysm for which she was referred to interventional neurology but possibility of procedure was delayed due to the pandemic. She was screened for FMD with Chest/Abd/Pel CTA and it was unremarkable. She was also noted to have borderline aortic root dilatation. Genetic testing was deferred per Dr. Roman. She has hypertension, symptomatic PVC's (low burden on prior monitors), and small PFO (neuro felt this is less likely the cause of her stroke). She has family history of premature CAD and stroke. Echocardiogram images have shown preserved LVEF with no valve disease with most recent study being in 2/2025. Recent carotid US showed less than 50% on both sides. ZIOpatch monitor in 1/2025 showed NSR with occasional premature beats.     She returns to clinic today feeling well. She has no symptoms of SOB or CP or palpitations. She is working on weight loss.     Assessment and Plan:  Sayda Valles is a very pleasant 39-year-old female with history of CVA secondary to vertebral artery dissection.  She was also found to have carotid artery dissection as well as small saccular distal carotid aneurysm.  She has PVC history, hypertension, and small PFO.     She is doing well from a cardiovascular standpoint. We will see her back in 1 year.     43 minutes spent on the date of the encounter with chart review, patient visit, care coordination, and documentation.    The longitudinal plan of care for the diagnose(s)/condition(s) as documented were addressed during this visit. Due to the added complexity in care, I will continue to support Sayda Valles  in the subsequent  management and with ongoing continuity of care.     This note was completed in part using Dragon voice recognition software. Although reviewed after completion, some word and grammatical errors may occur.    Orders this Visit:  No orders of the defined types were placed in this encounter.    Orders Placed This Encounter   Medications    cetirizine (ZYRTEC ALLERGY) 10 MG tablet     Sig: Take 10 mg by mouth daily.     Medications Discontinued During This Encounter   Medication Reason    famotidine (PEPCID) 20 MG tablet Stopped by Patient (No AVS)    loratadine (CLARITIN) 10 MG tablet Stopped by Patient (No AVS)         Encounter Diagnoses   Name Primary?    Palpitations     Benign essential hypertension     Carotid artery disease, unspecified laterality, unspecified type        CURRENT MEDICATIONS:  Current Outpatient Medications   Medication Sig Dispense Refill    aspirin (ASA) 81 MG EC tablet Take 1 tablet (81 mg) by mouth daily      cetirizine (ZYRTEC ALLERGY) 10 MG tablet Take 10 mg by mouth daily.      cyclobenzaprine (FLEXERIL) 5 MG tablet Take 1 tablet (5 mg) by mouth 3 times daily as needed for muscle spasms. 10 tablet 0    hydrOXYzine HCl (ATARAX) 25 MG tablet Take 1 tablet (25 mg) by mouth every 6 hours as needed for anxiety 30 tablet 1    metoprolol succinate ER (TOPROL XL) 50 MG 24 hr tablet TAKE 1 TABLET (50 MG) BY MOUTH DAILY 90 tablet 2    Multiple Vitamins-Minerals (MULTIVITAMIN GUMMIES WOMENS) CHEW Smarty Pants Women's Complete      PROBIOTIC PRODUCT PO Take 1 tablet by mouth daily      VITAMIN D PO Take 2,000 Int'l Units by mouth      zinc gluconate 50 MG tablet Take 50 mg by mouth as needed         ALLERGIES     Allergies   Allergen Reactions    Zithromax [Azithromycin Dihydrate] GI Disturbance    Azithromycin Nausea    Seasonal Allergies Other (See Comments)     seasonal       PAST MEDICAL HISTORY:  Past Medical History:   Diagnosis Date    Allergic rhinitis, cause unspecified     Allergic rhinitis     Aneurysm     3.6 mm aneurysm off the ophthalmic L IC art  Seen CT, MRI    Cervicalgia 03/17/2011    Encounter for other general counseling or advice on contraception 09/12/2007     Diagnosis updated by automated process. Provider to review and confirm.    Family history of malignant neoplasm of breast     4 generations on her father's side    FAMILY HX BREAST MALIG     Heart palpitations 02/2010    PVC's - dr sandhu    Hypertension goal BP (blood pressure) < 140/90 09/15/2020    Migraine without aura, with intractable migraine, so stated, without mention of status migrainosus     sees neurologist    Mild persistent asthma     Non morbid obesity due to excess calories 05/09/2016    Simple goiter 11/25/2008    pt has no enlargement and had normal blood work-up at that time    Stroke (H) 06/15/2019    due to L vert artery dissection after chiropractor manipulation       PAST SURGICAL HISTORY:  Past Surgical History:   Procedure Laterality Date    BIOPSY  1999    2 moles removed from scalp and biopsied, non cancerous.    COLONOSCOPY N/A 12/01/2021    Procedure: Unable to complete EGD; COLONOSCOPY;  Surgeon: Gume Douglas MD;  Location:  GI    ESOPHAGOSCOPY, GASTROSCOPY, DUODENOSCOPY (EGD), COMBINED N/A 12/01/2021    Procedure: ESOPHAGOGASTRODUODENOSCOPY (EGD) ATTEMPTED BUT UNABLE TO COMPLETE, needs deeper sedation; patient too gaggy;  Surgeon: Gume Douglas MD;  Location:  GI    NO HISTORY OF SURGERY         FAMILY HISTORY:  Family History   Problem Relation Age of Onset    Lipids Father     Neurologic Disorder Father         epilepsy    Heart Disease Father 47        MI    Hypertension Father     Hyperlipidemia Father     Anxiety Disorder Father     Lipids Sister     Hyperlipidemia Sister     Breast Cancer Paternal Grandmother         3rd generation     Breast Cancer Paternal Aunt     Diabetes Mother         gestational     Asthma Mother     Diabetes Paternal Grandfather     Other Cancer Paternal  Grandfather         Blood    Other Cancer Maternal Grandfather         Skin    Hyperlipidemia Paternal Half-Sister     Breast Cancer Other     Other Cancer Maternal Grandmother         Lung    Ovarian Cancer Maternal Grandmother     Lung Cancer Maternal Grandmother     Anesthesia Reaction Maternal Grandmother     Diabetes Maternal Uncle     Diabetes Maternal Uncle     Colon Cancer No family hx of        SOCIAL HISTORY:  Social History     Socioeconomic History    Marital status: Single     Spouse name: none    Number of children: None    Years of education: None    Highest education level: None   Occupational History    Occupation:  at [employer]      Employer: OTHER   Tobacco Use    Smoking status: Former     Current packs/day: 0.00     Average packs/day: 0.5 packs/day for 2.1 years (1.0 ttl pk-yrs)     Types: Cigarettes     Start date: 1999     Quit date: 2001     Years since quittin.2    Smokeless tobacco: Never   Vaping Use    Vaping status: Never Used   Substance and Sexual Activity    Alcohol use: Not Currently     Comment: 1 glass of wine few times per month    Drug use: No    Sexual activity: Not Currently     Partners: Male     Birth control/protection: Condom   Other Topics Concern     Service No    Blood Transfusions No    Caffeine Concern No    Occupational Exposure No    Hobby Hazards No    Sleep Concern No    Stress Concern No    Weight Concern Yes     Comment: Would like to lose more weight    Special Diet No    Back Care No    Exercise Yes     Comment: Moderate    Bike Helmet No    Seat Belt Yes     Comment: always     Self-Exams Yes     Comment: SBE encouraged monthly     Parent/sibling w/ CABG, MI or angioplasty before 65F 55M? Yes   Social History Narrative    Calcium - 2-3 dairy servings/ day     Menarche: at age 12    Menses: regular      Social Drivers of Health     Financial Resource Strain: Low Risk  (2024)    Financial Resource Strain      Within the past 12 months, have you or your family members you live with been unable to get utilities (heat, electricity) when it was really needed?: No   Food Insecurity: Low Risk  (6/13/2024)    Food Insecurity     Within the past 12 months, did you worry that your food would run out before you got money to buy more?: No     Within the past 12 months, did the food you bought just not last and you didn t have money to get more?: No   Transportation Needs: Low Risk  (6/13/2024)    Transportation Needs     Within the past 12 months, has lack of transportation kept you from medical appointments, getting your medicines, non-medical meetings or appointments, work, or from getting things that you need?: No   Physical Activity: Insufficiently Active (6/13/2024)    Exercise Vital Sign     Days of Exercise per Week: 3 days     Minutes of Exercise per Session: 20 min   Stress: No Stress Concern Present (6/13/2024)    [employer] - Occupational Stress Questionnaire     Feeling of Stress : Only a little   Social Connections: Unknown (6/13/2024)    Social Connection and Isolation Panel [NHANES]     Frequency of Social Gatherings with Friends and Family: Twice a week   Interpersonal Safety: Not At Risk (1/10/2022)    Received from [facility] and [facility]    Humiliation, Afraid, Rape, and Kick questionnaire     Fear of Current or Ex-Partner: No     Emotionally Abused: No     Physically Abused: No     Sexually Abused: No   Housing Stability: Low Risk  (6/13/2024)    Housing Stability     Do you have housing? : Yes     Are you worried about losing your housing?: No       Review of Systems:  Skin:        Eyes:  Positive for    ENT:  Positive for    Respiratory:  Negative    Cardiovascular:    palpitations, Positive for, edema  Gastroenterology:      Genitourinary:       Musculoskeletal:       Neurologic:       Psychiatric:       Heme/Lymph/Imm:       Endocrine:         Physical  "Exam:  Vitals: Ht 1.651 m (5' 5\")   BMI 38.77 kg/m       GEN:  NAD  NECK: No JVD  C/V:  Regular rate and rhythm, no murmur, rub or gallop.  RESP: Clear to auscultation bilaterally without wheezing, rales, or rhonchi.  GI: Abdomen soft, nontender, nondistended. No HSM appreciated.   EXTREM: No pitting LE edema.   NEURO: Alert and oriented, cooperative. No obvious focal deficits.   PSYCH: Normal affect.  SKIN: Warm and dry.       Recent Lab Results:  LIPID RESULTS:  Lab Results   Component Value Date    CHOL 161 06/14/2024    CHOL 167 09/22/2020    HDL 40 (L) 06/14/2024    HDL 42 (L) 09/22/2020    LDL 96 06/14/2024     (H) 09/22/2020    TRIG 125 06/14/2024    TRIG 96 09/22/2020    CHOLHDLRATIO 3.8 07/21/2014       LIVER ENZYME RESULTS:  Lab Results   Component Value Date    AST 19 06/14/2024    AST 13 09/22/2020    ALT 19 06/14/2024    ALT 23 09/22/2020       CBC RESULTS:  Lab Results   Component Value Date    WBC 5.7 06/14/2024    WBC 7.7 06/16/2019    RBC 4.91 06/14/2024    RBC 4.82 06/16/2019    HGB 13.3 06/14/2024    HGB 12.0 06/16/2019    HCT 39.7 06/14/2024    HCT 38.8 06/16/2019    MCV 81 06/14/2024    MCV 81 06/16/2019    MCH 27.1 06/14/2024    MCH 24.9 (L) 06/16/2019    MCHC 33.5 06/14/2024    MCHC 30.9 (L) 06/16/2019    RDW 12.8 06/14/2024    RDW 13.7 06/16/2019     06/14/2024     06/16/2019       BMP RESULTS:  Lab Results   Component Value Date     06/14/2024     09/22/2020    POTASSIUM 4.2 06/14/2024    POTASSIUM 4.4 09/20/2021    POTASSIUM 4.1 09/22/2020    CHLORIDE 105 06/14/2024    CHLORIDE 109 09/20/2021    CHLORIDE 109 09/22/2020    CO2 23 06/14/2024    CO2 27 09/20/2021    CO2 21 09/22/2020    ANIONGAP 10 06/14/2024    ANIONGAP 2 (L) 09/20/2021    ANIONGAP 6 09/22/2020    GLC 93 06/14/2024    GLC 77 09/20/2021    GLC 87 09/22/2020    BUN 10.3 06/14/2024    BUN 10 09/20/2021    BUN 9 09/22/2020    CR 0.70 06/14/2024    CR 0.70 09/22/2020    GFRESTIMATED >90 06/14/2024 " "   GFRESTIMATED >90 09/22/2020    GFRESTBLACK >90 09/22/2020    PRANAV 8.9 06/14/2024    PRANAV 8.3 (L) 09/22/2020        A1C RESULTS:  Lab Results   Component Value Date    A1C 5.1 06/14/2024    A1C 5.4 06/11/2003       INR RESULTS:  No results found for: \"INR\"          Dulce Andrew PA-C  May 14, 2025     "

## 2025-05-15 ENCOUNTER — PATIENT OUTREACH (OUTPATIENT)
Dept: CARE COORDINATION | Facility: CLINIC | Age: 40
End: 2025-05-15
Payer: COMMERCIAL

## 2025-06-17 ENCOUNTER — OFFICE VISIT (OUTPATIENT)
Dept: FAMILY MEDICINE | Facility: CLINIC | Age: 40
End: 2025-06-17
Attending: NURSE PRACTITIONER
Payer: COMMERCIAL

## 2025-06-17 ENCOUNTER — RESULTS FOLLOW-UP (OUTPATIENT)
Dept: FAMILY MEDICINE | Facility: CLINIC | Age: 40
End: 2025-06-17

## 2025-06-17 VITALS
RESPIRATION RATE: 16 BRPM | SYSTOLIC BLOOD PRESSURE: 118 MMHG | HEIGHT: 66 IN | WEIGHT: 234 LBS | DIASTOLIC BLOOD PRESSURE: 82 MMHG | HEART RATE: 92 BPM | OXYGEN SATURATION: 97 % | BODY MASS INDEX: 37.61 KG/M2 | TEMPERATURE: 98.3 F

## 2025-06-17 DIAGNOSIS — Z13.220 SCREENING FOR LIPID DISORDERS: ICD-10-CM

## 2025-06-17 DIAGNOSIS — E66.812 CLASS 2 SEVERE OBESITY WITH SERIOUS COMORBIDITY AND BODY MASS INDEX (BMI) OF 38.0 TO 38.9 IN ADULT, UNSPECIFIED OBESITY TYPE (H): ICD-10-CM

## 2025-06-17 DIAGNOSIS — Z13.1 SCREENING FOR DIABETES MELLITUS: ICD-10-CM

## 2025-06-17 DIAGNOSIS — Z00.00 ROUTINE GENERAL MEDICAL EXAMINATION AT A HEALTH CARE FACILITY: Primary | ICD-10-CM

## 2025-06-17 DIAGNOSIS — Z13.29 SCREENING FOR THYROID DISORDER: ICD-10-CM

## 2025-06-17 DIAGNOSIS — Z12.31 VISIT FOR SCREENING MAMMOGRAM: ICD-10-CM

## 2025-06-17 DIAGNOSIS — E66.01 CLASS 2 SEVERE OBESITY WITH SERIOUS COMORBIDITY AND BODY MASS INDEX (BMI) OF 38.0 TO 38.9 IN ADULT, UNSPECIFIED OBESITY TYPE (H): ICD-10-CM

## 2025-06-17 DIAGNOSIS — I10 BENIGN ESSENTIAL HYPERTENSION: ICD-10-CM

## 2025-06-17 PROBLEM — M99.02 SEGMENTAL DYSFUNCTION OF THORACIC REGION: Status: RESOLVED | Noted: 2019-02-05 | Resolved: 2025-06-17

## 2025-06-17 PROBLEM — M99.03 SEGMENTAL DYSFUNCTION OF LUMBAR REGION: Status: RESOLVED | Noted: 2019-02-05 | Resolved: 2025-06-17

## 2025-06-17 PROBLEM — E66.09 CLASS 2 OBESITY DUE TO EXCESS CALORIES WITHOUT SERIOUS COMORBIDITY WITH BODY MASS INDEX (BMI) OF 37.0 TO 37.9 IN ADULT: Status: RESOLVED | Noted: 2018-09-19 | Resolved: 2025-06-17

## 2025-06-17 PROBLEM — M99.01 SEGMENTAL DYSFUNCTION OF CERVICAL REGION: Status: RESOLVED | Noted: 2019-02-05 | Resolved: 2025-06-17

## 2025-06-17 PROBLEM — M99.04 SEGMENTAL DYSFUNCTION OF SACRAL REGION: Status: RESOLVED | Noted: 2019-02-05 | Resolved: 2025-06-17

## 2025-06-17 LAB
EST. AVERAGE GLUCOSE BLD GHB EST-MCNC: 103 MG/DL
HBA1C MFR BLD: 5.2 % (ref 0–5.6)

## 2025-06-17 PROCEDURE — 99214 OFFICE O/P EST MOD 30 MIN: CPT | Mod: 25 | Performed by: NURSE PRACTITIONER

## 2025-06-17 PROCEDURE — 3074F SYST BP LT 130 MM HG: CPT | Performed by: NURSE PRACTITIONER

## 2025-06-17 PROCEDURE — 99396 PREV VISIT EST AGE 40-64: CPT | Performed by: NURSE PRACTITIONER

## 2025-06-17 PROCEDURE — 3079F DIAST BP 80-89 MM HG: CPT | Performed by: NURSE PRACTITIONER

## 2025-06-17 PROCEDURE — 36415 COLL VENOUS BLD VENIPUNCTURE: CPT | Performed by: NURSE PRACTITIONER

## 2025-06-17 PROCEDURE — 80061 LIPID PANEL: CPT | Performed by: NURSE PRACTITIONER

## 2025-06-17 PROCEDURE — 83036 HEMOGLOBIN GLYCOSYLATED A1C: CPT | Performed by: NURSE PRACTITIONER

## 2025-06-17 PROCEDURE — 3044F HG A1C LEVEL LT 7.0%: CPT | Performed by: NURSE PRACTITIONER

## 2025-06-17 PROCEDURE — 80053 COMPREHEN METABOLIC PANEL: CPT | Performed by: NURSE PRACTITIONER

## 2025-06-17 PROCEDURE — 84443 ASSAY THYROID STIM HORMONE: CPT | Performed by: NURSE PRACTITIONER

## 2025-06-17 RX ORDER — METOPROLOL SUCCINATE 50 MG/1
50 TABLET, EXTENDED RELEASE ORAL DAILY
Qty: 90 TABLET | Refills: 3 | Status: SHIPPED | OUTPATIENT
Start: 2025-06-17

## 2025-06-17 RX ORDER — FLUTICASONE PROPIONATE 50 MCG
SPRAY, SUSPENSION (ML) NASAL
COMMUNITY

## 2025-06-17 SDOH — HEALTH STABILITY: PHYSICAL HEALTH: ON AVERAGE, HOW MANY DAYS PER WEEK DO YOU ENGAGE IN MODERATE TO STRENUOUS EXERCISE (LIKE A BRISK WALK)?: 1 DAY

## 2025-06-17 SDOH — HEALTH STABILITY: PHYSICAL HEALTH: ON AVERAGE, HOW MANY MINUTES DO YOU ENGAGE IN EXERCISE AT THIS LEVEL?: 30 MIN

## 2025-06-17 ASSESSMENT — ANXIETY QUESTIONNAIRES
6. BECOMING EASILY ANNOYED OR IRRITABLE: NOT AT ALL
4. TROUBLE RELAXING: NOT AT ALL
7. FEELING AFRAID AS IF SOMETHING AWFUL MIGHT HAPPEN: NOT AT ALL
GAD7 TOTAL SCORE: 0
7. FEELING AFRAID AS IF SOMETHING AWFUL MIGHT HAPPEN: NOT AT ALL
GAD7 TOTAL SCORE: 0
3. WORRYING TOO MUCH ABOUT DIFFERENT THINGS: NOT AT ALL
1. FEELING NERVOUS, ANXIOUS, OR ON EDGE: NOT AT ALL
5. BEING SO RESTLESS THAT IT IS HARD TO SIT STILL: NOT AT ALL
2. NOT BEING ABLE TO STOP OR CONTROL WORRYING: NOT AT ALL
GAD7 TOTAL SCORE: 0

## 2025-06-17 ASSESSMENT — PATIENT HEALTH QUESTIONNAIRE - PHQ9
10. IF YOU CHECKED OFF ANY PROBLEMS, HOW DIFFICULT HAVE THESE PROBLEMS MADE IT FOR YOU TO DO YOUR WORK, TAKE CARE OF THINGS AT HOME, OR GET ALONG WITH OTHER PEOPLE: SOMEWHAT DIFFICULT
SUM OF ALL RESPONSES TO PHQ QUESTIONS 1-9: 3
SUM OF ALL RESPONSES TO PHQ QUESTIONS 1-9: 3

## 2025-06-17 ASSESSMENT — ASTHMA QUESTIONNAIRES
QUESTION_3 LAST FOUR WEEKS HOW OFTEN DID YOUR ASTHMA SYMPTOMS (WHEEZING, COUGHING, SHORTNESS OF BREATH, CHEST TIGHTNESS OR PAIN) WAKE YOU UP AT NIGHT OR EARLIER THAN USUAL IN THE MORNING: NOT AT ALL
QUESTION_2 LAST FOUR WEEKS HOW OFTEN HAVE YOU HAD SHORTNESS OF BREATH: ONCE OR TWICE A WEEK
ACT_TOTALSCORE: 23
QUESTION_1 LAST FOUR WEEKS HOW MUCH OF THE TIME DID YOUR ASTHMA KEEP YOU FROM GETTING AS MUCH DONE AT WORK, SCHOOL OR AT HOME: NONE OF THE TIME
QUESTION_4 LAST FOUR WEEKS HOW OFTEN HAVE YOU USED YOUR RESCUE INHALER OR NEBULIZER MEDICATION (SUCH AS ALBUTEROL): NOT AT ALL
QUESTION_5 LAST FOUR WEEKS HOW WOULD YOU RATE YOUR ASTHMA CONTROL: WELL CONTROLLED

## 2025-06-17 ASSESSMENT — SOCIAL DETERMINANTS OF HEALTH (SDOH): HOW OFTEN DO YOU GET TOGETHER WITH FRIENDS OR RELATIVES?: ONCE A WEEK

## 2025-06-17 NOTE — PROGRESS NOTES
"Preventive Care Visit  [facility]UL[facility]  CHE Rueda CNP, Family Medicine  Jun 17, 2025    Assessment & Plan       Sayda was seen today for physical.    Diagnoses and all orders for this visit:    Routine general medical examination at a health care facility  -     REVIEW OF HEALTH MAINTENANCE PROTOCOL ORDERS  -     PRIMARY CARE FOLLOW-UP SCHEDULING    Visit for screening mammogram  -     MA Screening Bilateral w/ Ata; Future    Screening for lipid disorders  -     Lipid panel reflex to direct LDL Fasting    Screening for diabetes mellitus  -     Comprehensive metabolic panel (BMP + Alb, Alk Phos, ALT, AST, Total. Bili, TP)  -     Hemoglobin A1c    Screening for thyroid disorder  -     TSH with free T4 reflex    Class 2 severe obesity with serious comorbidity and body mass index (BMI) of 38.0 to 38.9 in adult, unspecified obesity type (H)  Continue to work with patient regarding dietary and lifestyle modifications to support weight loss.   Discussed oral medication vs. GLP-1 agonist medications for medical weight management.  Patient to consider and will follow-up with virtual visit if needed.      Benign essential hypertension  Stable, well controlled on Toprol XL 50 mg daily.    -     metoprolol succinate ER (TOPROL XL) 50 MG 24 hr tablet; Take 1 tablet (50 mg) by mouth daily.          BMI  Estimated body mass index is 38.35 kg/m  as calculated from the following:    Height as of this encounter: 1.664 m (5' 5.5\").    Weight as of this encounter: 106.1 kg (234 lb).       Counseling  Appropriate preventive services were addressed with this patient via screening, questionnaire, or discussion as appropriate for fall prevention, nutrition, physical activity, Tobacco-use cessation, social engagement, weight loss and cognition.  Checklist reviewing preventive services available has been given to the patient.  Reviewed patient's diet, addressing concerns and/or questions.   She is at risk for " lack of exercise and has been provided with information to increase physical activity for the benefit of her well-being.       Return in about 1 year (around 6/17/2026) for Preventative Visit + Fasting labs.            Helio Alfredo is a 39 year old, presenting for the following:  Physical        6/17/2025     9:36 AM   Additional Questions   Roomed by Heide BEYER MA   Accompanied by Self          HPI    Social:  works for [employer], daughter recently got accepted into nursing program at [facility] [place].    Has been dating boyfriend for over 1 year.      Patient would like to discuss weight loss.   Has difficulty with consistency with diet and exercise.   Has been attending chiropractor, getting infrared laser treatment.      Wt Readings from Last 4 Encounters:   06/17/25 106.1 kg (234 lb)   05/14/25 105.2 kg (231 lb 14.4 oz)   01/10/25 105.7 kg (233 lb)   10/09/24 106.6 kg (235 lb)     Mirena IUD placed in February 2025.      Recent Labs   Lab Test 06/14/24  0743 09/22/20  1118   CHOL 161 167   HDL 40* 42*   LDL 96 106*   TRIG 125 96       Hypertension Follow-up    Do you check your blood pressure regularly outside of the clinic? Yes   Are you following a low salt diet? No  Are your blood pressures ever more than 140 on the top number (systolic) OR more   than 90 on the bottom number (diastolic), for example 140/90? No    BP Readings from Last 2 Encounters:   06/17/25 118/82   05/14/25 124/87       Advance Care Planning  Discussed advance care planning with patient; however, patient declined at this time.        6/17/2025   General Health   How would you rate your overall physical health? (!) FAIR   Feel stress (tense, anxious, or unable to sleep) Not at all         6/17/2025   Nutrition   Three or more servings of calcium each day? (!) NO   Diet: Regular (no restrictions)   How many servings of fruit and vegetables per day? (!) 2-3   How many sweetened beverages each day? 0-1         6/17/2025   Exercise   Days per week of  moderate/strenous exercise 1 day   Average minutes spent exercising at this level 30 min   (!) EXERCISE CONCERN      2025   Social Factors   Frequency of gathering with friends or relatives Once a week   Worry food won't last until get money to buy more No   Food not last or not have enough money for food? No   Do you have housing? (Housing is defined as stable permanent housing and does not include staying outside in a car, in a tent, in an abandoned building, in an overnight shelter, or couch-surfing.) Yes   Are you worried about losing your housing? No   Lack of transportation? No   Unable to get utilities (heat,electricity)? No         2025   Dental   Dentist two times every year? Yes       Today's PHQ-9 Score:       2025    10:25 AM   PHQ-9 SCORE   PHQ-9 Total Score MyChart 3 (Minimal depression)   PHQ-9 Total Score 3        Patient-reported         2025   Substance Use   Alcohol more than 3/day or more than 7/wk No   Do you use any other substances recreationally? No     Social History     Tobacco Use    Smoking status: Former     Current packs/day: 0.00     Average packs/day: 0.5 packs/day for 2.1 years (1.0 ttl pk-yrs)     Types: Cigarettes     Start date: 1999     Quit date: 2001     Years since quittin.3     Passive exposure: Past    Smokeless tobacco: Never   Vaping Use    Vaping status: Never Used   Substance Use Topics    Alcohol use: Not Currently     Comment: 1 glass of wine few times per month    Drug use: No       Mammogram Screening - Mammogram every 1-2 years updated in Health Maintenance based on mutual decision making        2025   STI Screening   New sexual partner(s) since last STI/HIV test? (!) YES      History of abnormal Pap smear: No - age 30-64 HPV with reflex Pap every 5 years [facility]  [facility] in 20[facility].        Latest Ref Rng & Units 2020     9:48 AM 2020     9:46 AM 3/11/2016    10:47 AM   PAP / HPV   PAP  (Historical)   NIL     HPV 16 DNA NEG^Negative Negative   Negative    HPV 18 DNA NEG^Negative Negative   Negative    Other HR HPV NEG^Negative Negative   Negative      ASCVD Risk   The ASCVD Risk score (Cyndy DK, et al., 2019) failed to calculate for the following reasons:    Risk score cannot be calculated because patient has a medical history suggesting prior/existing ASCVD        6/17/2025   Contraception/Family Planning   Questions about contraception or family planning No       Reviewed and updated as needed this visit by Provider      Problems               BP Readings from Last 3 Encounters:   06/17/25 118/82   05/14/25 124/87   01/10/25 137/87    Wt Readings from Last 3 Encounters:   06/17/25 106.1 kg (234 lb)   05/14/25 105.2 kg (231 lb 14.4 oz)   01/10/25 105.7 kg (233 lb)                  Patient Active Problem List   Diagnosis    Allergic rhinitis    Varicose Veins of Legs- painful with standing    Heart palpitations - since 2010 intermittent. Reports multiple work-ups with unclear cause. Followed by cardiology.    Anxiety    Migraine with aura and without status migrainosus, not intractable - since 2003; stable.    Mild persistent asthma without complication    Bilateral ankle pain    Family history of breast cancer - paternal grandma, paternal aunt and paternal great-grandmother. Dad completed genetic screening which was neg.     Mechanical back pain    Stroke (H)    Vertebral artery dissection    Borderline Aortic root enlargement (H)    History of stroke    Brain aneurysm    Hypertension goal BP (blood pressure) < 140/90    Morbid obesity (H)    Patent foramen ovale    Thyroid nodule     Past Surgical History:   Procedure Laterality Date    BIOPSY  1999    2 moles removed from scalp and biopsied, non cancerous.    COLONOSCOPY N/A 12/01/2021    Procedure: Unable to complete EGD; COLONOSCOPY;  Surgeon: Gume Douglas MD;  Location:  GI    ESOPHAGOSCOPY, GASTROSCOPY, DUODENOSCOPY (EGD),  COMBINED N/A 2021    Procedure: ESOPHAGOGASTRODUODENOSCOPY (EGD) ATTEMPTED BUT UNABLE TO COMPLETE, needs deeper sedation; patient too gaggy;  Surgeon: Gume Douglas MD;  Location:  GI    NO HISTORY OF SURGERY         Social History     Tobacco Use    Smoking status: Former     Current packs/day: 0.00     Average packs/day: 0.5 packs/day for 2.1 years (1.0 ttl pk-yrs)     Types: Cigarettes     Start date: 1999     Quit date: 2001     Years since quittin.3     Passive exposure: Past    Smokeless tobacco: Never   Substance Use Topics    Alcohol use: Not Currently     Comment: 1 glass of wine few times per month     Family History   Problem Relation Age of Onset    Lipids Father     Neurologic Disorder Father         epilepsy    Heart Disease Father 47        MI    Hypertension Father     Hyperlipidemia Father     Anxiety Disorder Father     Lipids Sister     Hyperlipidemia Sister     Breast Cancer Paternal Grandmother         3rd generation     Hyperlipidemia Paternal Grandmother     Breast Cancer Paternal Aunt     Diabetes Mother         gestational     Asthma Mother     Diabetes Paternal Grandfather     Other Cancer Paternal Grandfather         Blood    Other Cancer Maternal Grandfather         lung    Hyperlipidemia Paternal Half-Sister     Breast Cancer Other     Other Cancer Maternal Grandmother         Lung    Ovarian Cancer Maternal Grandmother     Lung Cancer Maternal Grandmother     Anesthesia Reaction Maternal Grandmother     Diabetes Maternal Uncle     Diabetes Maternal Uncle     Colon Cancer No family hx of          Current Outpatient Medications   Medication Sig Dispense Refill    aspirin (ASA) 81 MG EC tablet Take 1 tablet (81 mg) by mouth daily      cyclobenzaprine (FLEXERIL) 5 MG tablet Take 1 tablet (5 mg) by mouth 3 times daily as needed for muscle spasms. 10 tablet 0    fluticasone (FLONASE) 50 MCG/ACT nasal spray  (Patient taking differently: as needed.)      hydrOXYzine  "HCl (ATARAX) 25 MG tablet Take 1 tablet (25 mg) by mouth every 6 hours as needed for anxiety 30 tablet 1    levonorgestrel (MIRENA) 52 MG (20 mcg/day) IUD 1 Intra Uterine Device by Intrauterine route.      metoprolol succinate ER (TOPROL XL) 50 MG 24 hr tablet Take 1 tablet (50 mg) by mouth daily. 90 tablet 3    Multiple Vitamins-Minerals (MULTIVITAMIN GUMMIES WOMENS) CHEW Smarty Pants Women's Complete      PROBIOTIC PRODUCT PO Take 1 tablet by mouth daily      VITAMIN D PO Take 2,000 Int'l Units by mouth      zinc gluconate 50 MG tablet Take 50 mg by mouth as needed      cetirizine (ZYRTEC ALLERGY) 10 MG tablet Take 10 mg by mouth daily.           Review of Systems  Constitutional, HEENT, cardiovascular, pulmonary, gi and gu systems are negative, except as otherwise noted.     Objective    Exam  /82   Pulse 92   Temp 98.3  F (36.8  C) (Tympanic)   Resp 16   Ht 1.664 m (5' 5.5\")   Wt 106.1 kg (234 lb)   LMP 05/31/2025 (Exact Date)   SpO2 97%   BMI 38.35 kg/m     Estimated body mass index is 38.35 kg/m  as calculated from the following:    Height as of this encounter: 1.664 m (5' 5.5\").    Weight as of this encounter: 106.1 kg (234 lb).    Physical Exam    GENERAL: alert and no distress  EYES: Eyes grossly normal to inspection  HENT: ear canals and TM's normal, nose and mouth without ulcers or lesions  NECK: no adenopathy, no asymmetry, masses, or scars  RESP: lungs clear to auscultation - no rales, rhonchi or wheezes  CV: regular rate and rhythm, normal S1 S2, no S3 or S4, no murmur, click or rub, no peripheral edema  ABDOMEN: soft, nontender, bowel sounds normal  MS: no gross musculoskeletal defects noted, no edema  SKIN: no suspicious lesions or rashes  NEURO: Normal strength and tone, mentation intact and speech normal  PSYCH: mentation appears normal, affect normal/bright        Signed Electronically by: Brittany Miles, CHE CNP    "

## 2025-06-17 NOTE — PATIENT INSTRUCTIONS
Patient Education   Preventive Care Advice   This is general advice given by our system to help you stay healthy. However, your care team may have specific advice just for you. Please talk to your care team about your preventive care needs.  Nutrition  Eat 5 or more servings of fruits and vegetables each day.  Try wheat bread, brown rice and whole grain pasta (instead of white bread, rice, and pasta).  Get enough calcium and vitamin D. Check the label on foods and aim for 100% of the RDA (recommended daily allowance).  Lifestyle  Exercise at least 150 minutes each week  (30 minutes a day, 5 days a week).  Do muscle strengthening activities 2 days a week. These help control your weight and prevent disease.  No smoking.  Wear sunscreen to prevent skin cancer.  Have a dental exam and cleaning every 6 months.  Yearly exams  See your health care team every year to talk about:  Any changes in your health.  Any medicines your care team has prescribed.  Preventive care, family planning, and ways to prevent chronic diseases.  Shots (vaccines)   HPV shots (up to age 26), if you've never had them before.  Hepatitis B shots (up to age 59), if you've never had them before.  COVID-19 shot: Get this shot when it's due.  Flu shot: Get a flu shot every year.  Tetanus shot: Get a tetanus shot every 10 years.  Pneumococcal, hepatitis A, and RSV shots: Ask your care team if you need these based on your risk.  Shingles shot (for age 50 and up)  General health tests  Diabetes screening:  Starting at age 35, Get screened for diabetes at least every 3 years.  If you are younger than age 35, ask your care team if you should be screened for diabetes.  Cholesterol test: At age 39, start having a cholesterol test every 5 years, or more often if advised.  Bone density scan (DEXA): At age 50, ask your care team if you should have this scan for osteoporosis (brittle bones).  Hepatitis C: Get tested at least once in your life.  STIs (sexually  transmitted infections)  Before age 24: Ask your care team if you should be screened for STIs.  After age 24: Get screened for STIs if you're at risk. You are at risk for STIs (including HIV) if:  You are sexually active with more than one person.  You don't use condoms every time.  You or a partner was diagnosed with a sexually transmitted infection.  If you are at risk for HIV, ask about PrEP medicine to prevent HIV.  Get tested for HIV at least once in your life, whether you are at risk for HIV or not.  Cancer screening tests  Cervical cancer screening: If you have a cervix, begin getting regular cervical cancer screening tests starting at age 21.  Breast cancer scan (mammogram): If you've ever had breasts, begin having regular mammograms starting at age 40. This is a scan to check for breast cancer.  Colon cancer screening: It is important to start screening for colon cancer at age 45.  Have a colonoscopy test every 10 years (or more often if you're at risk) Or, ask your provider about stool tests like a FIT test every year or Cologuard test every 3 years.  To learn more about your testing options, visit:   .  For help making a decision, visit:   https://bit.ly/ud58704.  Prostate cancer screening test: If you have a prostate, ask your care team if a prostate cancer screening test (PSA) at age 55 is right for you.  Lung cancer screening: If you are a current or former smoker ages 50 to 80, ask your care team if ongoing lung cancer screenings are right for you.  For informational purposes only. Not to replace the advice of your health care provider. Copyright   2023 [employer]. All rights reserved. Clinically reviewed by the [facility]. [place] [id number] - REV 01/24.

## 2025-06-17 NOTE — Clinical Note
Please abstract normal pap smear completed in June 2024 at [facility].    Thank you, Wellington, CNP

## 2025-06-18 ENCOUNTER — PATIENT OUTREACH (OUTPATIENT)
Dept: CARE COORDINATION | Facility: CLINIC | Age: 40
End: 2025-06-18
Payer: COMMERCIAL

## 2025-06-18 LAB
ALBUMIN SERPL BCG-MCNC: 4 G/DL (ref 3.5–5.2)
ALP SERPL-CCNC: 47 U/L (ref 40–150)
ALT SERPL W P-5'-P-CCNC: 16 U/L (ref 0–50)
ANION GAP SERPL CALCULATED.3IONS-SCNC: 8 MMOL/L (ref 7–15)
AST SERPL W P-5'-P-CCNC: 21 U/L (ref 0–45)
BILIRUB SERPL-MCNC: 0.5 MG/DL
BUN SERPL-MCNC: 6.8 MG/DL (ref 6–20)
CALCIUM SERPL-MCNC: 8.7 MG/DL (ref 8.8–10.4)
CHLORIDE SERPL-SCNC: 106 MMOL/L (ref 98–107)
CHOLEST SERPL-MCNC: 147 MG/DL
CREAT SERPL-MCNC: 0.65 MG/DL (ref 0.51–0.95)
EGFRCR SERPLBLD CKD-EPI 2021: >90 ML/MIN/1.73M2
FASTING STATUS PATIENT QL REPORTED: YES
FASTING STATUS PATIENT QL REPORTED: YES
GLUCOSE SERPL-MCNC: 93 MG/DL (ref 70–99)
HCO3 SERPL-SCNC: 25 MMOL/L (ref 22–29)
HDLC SERPL-MCNC: 39 MG/DL
LDLC SERPL CALC-MCNC: 91 MG/DL
NONHDLC SERPL-MCNC: 108 MG/DL
POTASSIUM SERPL-SCNC: 4.4 MMOL/L (ref 3.4–5.3)
PROT SERPL-MCNC: 6.6 G/DL (ref 6.4–8.3)
SODIUM SERPL-SCNC: 139 MMOL/L (ref 135–145)
TRIGL SERPL-MCNC: 83 MG/DL
TSH SERPL DL<=0.005 MIU/L-ACNC: 0.52 UIU/ML (ref 0.3–4.2)

## 2025-07-13 ENCOUNTER — MYC MEDICAL ADVICE (OUTPATIENT)
Dept: FAMILY MEDICINE | Facility: CLINIC | Age: 40
End: 2025-07-13
Payer: COMMERCIAL

## 2025-07-14 ENCOUNTER — HOSPITAL ENCOUNTER (OUTPATIENT)
Dept: MAMMOGRAPHY | Facility: CLINIC | Age: 40
Discharge: HOME | End: 2025-07-14
Attending: NURSE PRACTITIONER | Admitting: NURSE PRACTITIONER
Payer: COMMERCIAL

## 2025-07-14 DIAGNOSIS — Z12.31 VISIT FOR SCREENING MAMMOGRAM: ICD-10-CM

## 2025-07-14 PROCEDURE — 77063 BREAST TOMOSYNTHESIS BI: CPT

## 2025-07-14 NOTE — TELEPHONE ENCOUNTER
LOV 6/17/2025    Okay to order GLP1? Would you like another visit to discuss more indepth?    Video?    Routing to provider to review and advise.     Emy Anders RN   [facility]

## 2025-07-15 ENCOUNTER — VIRTUAL VISIT (OUTPATIENT)
Dept: FAMILY MEDICINE | Facility: CLINIC | Age: 40
End: 2025-07-15
Payer: COMMERCIAL

## 2025-07-15 DIAGNOSIS — E66.01 CLASS 2 SEVERE OBESITY WITH SERIOUS COMORBIDITY AND BODY MASS INDEX (BMI) OF 38.0 TO 38.9 IN ADULT, UNSPECIFIED OBESITY TYPE (H): Primary | ICD-10-CM

## 2025-07-15 DIAGNOSIS — E66.812 CLASS 2 SEVERE OBESITY WITH SERIOUS COMORBIDITY AND BODY MASS INDEX (BMI) OF 38.0 TO 38.9 IN ADULT, UNSPECIFIED OBESITY TYPE (H): Primary | ICD-10-CM

## 2025-07-15 PROCEDURE — 98006 SYNCH AUDIO-VIDEO EST MOD 30: CPT | Performed by: NURSE PRACTITIONER

## 2025-07-15 NOTE — PROGRESS NOTES
"Sayda is a 39 year old who is being evaluated via a billable video visit.    How would you like to obtain your AVS? MyChart  If the video visit is dropped, the invitation should be resent by: Text to cell phone: [phone]  Will anyone else be joining your video visit? No    Assessment & Plan   Sayda was seen today for weight management.    Diagnoses and all orders for this visit:    Class 2 severe obesity with serious comorbidity and body mass index (BMI) of 38.0 to 38.9 in adult, unspecified obesity type (H)  Patient education completed regarding indication and GLP-1 agonist regimen, discussed possible adverse effects.  Plan initiation of Wegovy 0.25 mg once weekly for 1 month and then transition to 0.5 mg once weekly.  Continue with dietary and lifestyle modifications to support weight loss.    -     semaglutide-weight management (WEGOVY) 0.25 MG/0.5ML pen; Inject 0.5 mLs (0.25 mg) subcutaneously once a week for 28 days.  -     Semaglutide-Weight Management (WEGOVY) 0.5 MG/0.5ML pen; Inject 0.5 mg subcutaneously once a week.          BMI  Estimated body mass index is 38.35 kg/m  as calculated from the following:    Height as of 6/17/25: 1.664 m (5' 5.5\").    Weight as of 6/17/25: 106.1 kg (234 lb).     Return in about 2 months (around 9/15/2025) for Video Visit, Med check.        Subjective   Sayda is a 39 year old, presenting for the following health issues:  Weight Management        7/15/2025     5:21 PM   Additional Questions   Roomed by Heide BEYER MA   Accompanied by Self     History of Present Illness       Reason for visit:  Weight loss    She eats 2-3 servings of fruits and vegetables daily.She consumes 1 sweetened beverage(s) daily.She exercises with enough effort to increase her heart rate 9 or less minutes per day.  She exercises with enough effort to increase her heart rate 3 or less days per week.   She is taking medications regularly.        Patient reports calling her insurance company and it appears that " Wegovy/GLP-1 agonists are covered for weight loss.    Patient is worried about the adverse effects.      Wt Readings from Last 4 Encounters:   06/17/25 106.1 kg (234 lb)   05/14/25 105.2 kg (231 lb 14.4 oz)   01/10/25 105.7 kg (233 lb)   10/09/24 106.6 kg (235 lb)     Goal weight:  180-190 lbs  Would like to lose 50 lbs.  Wants to be at a healthy weight.   Has tried various efforts to lose weight without significant success.          Review of Systems  Constitutional, HEENT, cardiovascular, pulmonary, gi and gu systems are negative, except as otherwise noted.      Objective           Vitals:  No vitals were obtained today due to virtual visit.    Physical Exam   GENERAL: alert and no distress  EYES: Eyes grossly normal to inspection.  No discharge or erythema, or obvious scleral/conjunctival abnormalities.  RESP: No audible wheeze, cough, or visible cyanosis.    SKIN: Visible skin clear. No significant rash, abnormal pigmentation or lesions.  NEURO: Cranial nerves grossly intact.  Mentation and speech appropriate for age.  PSYCH: Appropriate affect, tone, and pace of words          Video-Visit Details    Type of service:  Video Visit   Originating Location (pt. Location): Home  Distant Location (provider location):  On-site  Platform used for Video Visit: Dylan      Signed Electronically by: CHE Rueda CNP

## 2025-07-15 NOTE — TELEPHONE ENCOUNTER
Recommend video visit for further discussion regarding initiation of GLP-1 agonist.  - Wellington, CNP

## 2025-09-03 DIAGNOSIS — I10 BENIGN ESSENTIAL HYPERTENSION: ICD-10-CM

## 2025-09-03 RX ORDER — METOPROLOL SUCCINATE 50 MG/1
50 TABLET, EXTENDED RELEASE ORAL DAILY
Qty: 90 TABLET | Refills: 2 | OUTPATIENT
Start: 2025-09-03

## 2025-09-03 RX ORDER — METOPROLOL SUCCINATE 50 MG/1
50 TABLET, EXTENDED RELEASE ORAL DAILY
Qty: 90 TABLET | Refills: 3 | OUTPATIENT
Start: 2025-09-03

## (undated) DEVICE — KIT ENDO TURNOVER/PROCEDURE W/CLEAN A SCOPE LINERS 103888

## (undated) DEVICE — ENDO BITE BLOCK ADULT OLYMPUS LATEX FREE MAJ-1632